# Patient Record
Sex: MALE | Race: WHITE | Employment: OTHER | URBAN - NONMETROPOLITAN AREA
[De-identification: names, ages, dates, MRNs, and addresses within clinical notes are randomized per-mention and may not be internally consistent; named-entity substitution may affect disease eponyms.]

---

## 2020-06-06 ENCOUNTER — HOSPITAL ENCOUNTER (OUTPATIENT)
Age: 75
Setting detail: SPECIMEN
Discharge: HOME | End: 2020-06-06
Payer: MEDICARE

## 2020-06-06 LAB
ALBUMIN SERPL-MCNC: 3.3 GM/DL (ref 3.4–5)
ALP BLD-CCNC: 97 IU/L (ref 40–129)
ALT SERPL-CCNC: 9 U/L (ref 10–40)
ANION GAP SERPL CALCULATED.3IONS-SCNC: 13 MMOL/L (ref 4–16)
AST SERPL-CCNC: 15 IU/L (ref 15–37)
BACTERIA: ABNORMAL /HPF
BASOPHILS ABSOLUTE: 0.1 K/CU MM
BASOPHILS RELATIVE PERCENT: 0.5 % (ref 0–1)
BILIRUB SERPL-MCNC: 0.7 MG/DL (ref 0–1)
BILIRUBIN URINE: NEGATIVE MG/DL
BLOOD, URINE: NEGATIVE
BUN BLDV-MCNC: 14 MG/DL (ref 6–23)
CALCIUM SERPL-MCNC: 9.1 MG/DL (ref 8.3–10.6)
CAST TYPE: NEGATIVE /HPF
CHLORIDE BLD-SCNC: 97 MMOL/L (ref 99–110)
CLARITY: ABNORMAL
CO2: 30 MMOL/L (ref 21–32)
COLOR: YELLOW
CREAT SERPL-MCNC: 0.5 MG/DL (ref 0.9–1.3)
CRYSTAL TYPE: NEGATIVE /HPF
DIFFERENTIAL TYPE: ABNORMAL
EOSINOPHILS ABSOLUTE: 0.2 K/CU MM
EOSINOPHILS RELATIVE PERCENT: 1.8 % (ref 0–3)
EPITHELIAL CELLS, UA: ABNORMAL /HPF
GFR AFRICAN AMERICAN: >60 ML/MIN/1.73M2
GFR NON-AFRICAN AMERICAN: >60 ML/MIN/1.73M2
GLUCOSE BLD-MCNC: 290 MG/DL (ref 70–99)
GLUCOSE, URINE: 500 MG/DL
HCT VFR BLD CALC: 48.8 % (ref 42–52)
HEMOGLOBIN: 15.3 GM/DL (ref 13.5–18)
IMMATURE NEUTROPHIL %: 0.3 % (ref 0–0.43)
KETONES, URINE: NEGATIVE MG/DL
LEUKOCYTE ESTERASE, URINE: NEGATIVE
LYMPHOCYTES ABSOLUTE: 5.1 K/CU MM
LYMPHOCYTES RELATIVE PERCENT: 50.4 % (ref 24–44)
MCH RBC QN AUTO: 29.8 PG (ref 27–31)
MCHC RBC AUTO-ENTMCNC: 31.4 % (ref 32–36)
MCV RBC AUTO: 94.9 FL (ref 78–100)
MONOCYTES ABSOLUTE: 1.1 K/CU MM
MONOCYTES RELATIVE PERCENT: 11.1 % (ref 0–4)
NITRITE URINE, QUANTITATIVE: NEGATIVE
PDW BLD-RTO: 14.2 % (ref 11.7–14.9)
PH, URINE: 6 (ref 5–8)
PLATELET # BLD: 400 K/CU MM (ref 140–440)
PMV BLD AUTO: 11 FL (ref 7.5–11.1)
POTASSIUM SERPL-SCNC: 4.2 MMOL/L (ref 3.5–5.1)
PROTEIN UA: NEGATIVE MG/DL
RBC # BLD: 5.14 M/CU MM (ref 4.6–6.2)
RBC URINE: NEGATIVE /HPF (ref 0–3)
SEGMENTED NEUTROPHILS ABSOLUTE COUNT: 3.6 K/CU MM
SEGMENTED NEUTROPHILS RELATIVE PERCENT: 35.9 % (ref 36–66)
SODIUM BLD-SCNC: 140 MMOL/L (ref 135–145)
SPECIFIC GRAVITY UA: 1.02 (ref 1–1.03)
TOTAL IMMATURE NEUTOROPHIL: 0.03 K/CU MM
TOTAL PROTEIN: 7 GM/DL (ref 6.4–8.2)
UROBILINOGEN, URINE: 4 MG/DL (ref 0.2–1)
WBC # BLD: 10 K/CU MM (ref 4–10.5)
WBC UA: NEGATIVE /HPF (ref 0–2)

## 2020-06-06 PROCEDURE — 87086 URINE CULTURE/COLONY COUNT: CPT

## 2020-06-06 PROCEDURE — 85025 COMPLETE CBC W/AUTO DIFF WBC: CPT

## 2020-06-06 PROCEDURE — 36415 COLL VENOUS BLD VENIPUNCTURE: CPT

## 2020-06-06 PROCEDURE — 80053 COMPREHEN METABOLIC PANEL: CPT

## 2020-06-06 PROCEDURE — 81001 URINALYSIS AUTO W/SCOPE: CPT

## 2020-06-07 LAB
CULTURE: NORMAL
Lab: NORMAL
SPECIMEN: NORMAL

## 2020-07-13 ENCOUNTER — HOSPITAL ENCOUNTER (OUTPATIENT)
Age: 75
Setting detail: SPECIMEN
Discharge: HOME | End: 2020-07-13
Payer: MEDICARE

## 2020-07-13 LAB
ALBUMIN SERPL-MCNC: 3.5 GM/DL (ref 3.4–5)
ALP BLD-CCNC: 72 IU/L (ref 40–128)
ALT SERPL-CCNC: 7 U/L (ref 10–40)
ANION GAP SERPL CALCULATED.3IONS-SCNC: 12 MMOL/L (ref 4–16)
AST SERPL-CCNC: 12 IU/L (ref 15–37)
BILIRUB SERPL-MCNC: 0.6 MG/DL (ref 0–1)
BUN BLDV-MCNC: 17 MG/DL (ref 6–23)
CALCIUM SERPL-MCNC: 8.9 MG/DL (ref 8.3–10.6)
CHLORIDE BLD-SCNC: 98 MMOL/L (ref 99–110)
CHOLESTEROL: 159 MG/DL
CO2: 27 MMOL/L (ref 21–32)
CREAT SERPL-MCNC: 0.4 MG/DL (ref 0.9–1.3)
ESTIMATED AVERAGE GLUCOSE: 214 MG/DL
GFR AFRICAN AMERICAN: >60 ML/MIN/1.73M2
GFR NON-AFRICAN AMERICAN: >60 ML/MIN/1.73M2
GLUCOSE BLD-MCNC: 214 MG/DL (ref 70–99)
HBA1C MFR BLD: 9.1 % (ref 4.2–6.3)
HDLC SERPL-MCNC: 41 MG/DL
LDL CHOLESTEROL DIRECT: 100 MG/DL
POTASSIUM SERPL-SCNC: 4.5 MMOL/L (ref 3.5–5.1)
SODIUM BLD-SCNC: 137 MMOL/L (ref 135–145)
TOTAL PROTEIN: 6.6 GM/DL (ref 6.4–8.2)
TRIGL SERPL-MCNC: 125 MG/DL

## 2020-07-13 PROCEDURE — 80053 COMPREHEN METABOLIC PANEL: CPT

## 2020-07-13 PROCEDURE — 83036 HEMOGLOBIN GLYCOSYLATED A1C: CPT

## 2020-07-13 PROCEDURE — 80061 LIPID PANEL: CPT

## 2020-07-13 PROCEDURE — 83721 ASSAY OF BLOOD LIPOPROTEIN: CPT

## 2021-01-01 ENCOUNTER — APPOINTMENT (OUTPATIENT)
Dept: GENERAL RADIOLOGY | Age: 76
DRG: 871 | End: 2021-01-01
Payer: MEDICARE

## 2021-01-01 ENCOUNTER — HOSPITAL ENCOUNTER (OUTPATIENT)
Dept: WOUND CARE | Age: 76
Discharge: HOME | End: 2021-07-20
Payer: MEDICARE

## 2021-01-01 ENCOUNTER — HOSPITAL ENCOUNTER (OUTPATIENT)
Dept: WOUND CARE | Age: 76
Discharge: HOME | End: 2021-08-10
Payer: MEDICARE

## 2021-01-01 ENCOUNTER — HOSPITAL ENCOUNTER (OUTPATIENT)
Dept: WOUND CARE | Age: 76
Discharge: HOME | End: 2021-07-27
Payer: MEDICARE

## 2021-01-01 ENCOUNTER — HOSPITAL ENCOUNTER (OUTPATIENT)
Dept: WOUND CARE | Age: 76
Discharge: HOME | End: 2021-09-14
Payer: MEDICARE

## 2021-01-01 ENCOUNTER — APPOINTMENT (OUTPATIENT)
Dept: CT IMAGING | Age: 76
DRG: 871 | End: 2021-01-01
Payer: MEDICARE

## 2021-01-01 ENCOUNTER — HOSPITAL ENCOUNTER (OUTPATIENT)
Dept: WOUND CARE | Age: 76
Discharge: HOME | End: 2021-08-24
Payer: MEDICARE

## 2021-01-01 ENCOUNTER — TELEPHONE (OUTPATIENT)
Dept: WOUND CARE | Age: 76
End: 2021-01-01

## 2021-01-01 ENCOUNTER — HOSPITAL ENCOUNTER (OUTPATIENT)
Dept: WOUND CARE | Age: 76
Discharge: HOME | End: 2021-11-02

## 2021-01-01 ENCOUNTER — HOSPITAL ENCOUNTER (OUTPATIENT)
Age: 76
Setting detail: SPECIMEN
Discharge: HOME | End: 2021-09-23
Payer: MEDICARE

## 2021-01-01 ENCOUNTER — HOSPITAL ENCOUNTER (OUTPATIENT)
Dept: WOUND CARE | Age: 76
Discharge: HOME | End: 2021-05-13
Payer: MEDICARE

## 2021-01-01 ENCOUNTER — HOSPITAL ENCOUNTER (OUTPATIENT)
Dept: WOUND CARE | Age: 76
Discharge: HOME | End: 2021-10-19
Payer: MEDICARE

## 2021-01-01 ENCOUNTER — HOSPITAL ENCOUNTER (OUTPATIENT)
Dept: WOUND CARE | Age: 76
Discharge: HOME | End: 2021-07-13
Payer: MEDICARE

## 2021-01-01 ENCOUNTER — HOSPITAL ENCOUNTER (INPATIENT)
Age: 76
LOS: 13 days | DRG: 871 | End: 2021-11-23
Attending: EMERGENCY MEDICINE | Admitting: INTERNAL MEDICINE
Payer: MEDICARE

## 2021-01-01 ENCOUNTER — APPOINTMENT (OUTPATIENT)
Dept: INTERVENTIONAL RADIOLOGY/VASCULAR | Age: 76
DRG: 871 | End: 2021-01-01
Payer: MEDICARE

## 2021-01-01 ENCOUNTER — HOSPITAL ENCOUNTER (OUTPATIENT)
Dept: WOUND CARE | Age: 76
Discharge: HOME | End: 2021-05-04
Payer: MEDICARE

## 2021-01-01 ENCOUNTER — HOSPITAL ENCOUNTER (OUTPATIENT)
Dept: WOUND CARE | Age: 76
Discharge: HOME | End: 2021-10-05
Payer: MEDICARE

## 2021-01-01 VITALS
TEMPERATURE: 97.8 F | SYSTOLIC BLOOD PRESSURE: 128 MMHG | DIASTOLIC BLOOD PRESSURE: 69 MMHG | HEART RATE: 67 BPM | RESPIRATION RATE: 18 BRPM

## 2021-01-01 VITALS
DIASTOLIC BLOOD PRESSURE: 65 MMHG | SYSTOLIC BLOOD PRESSURE: 106 MMHG | HEART RATE: 66 BPM | TEMPERATURE: 97.4 F | DIASTOLIC BLOOD PRESSURE: 63 MMHG | HEART RATE: 84 BPM | RESPIRATION RATE: 16 BRPM | TEMPERATURE: 97.2 F | RESPIRATION RATE: 18 BRPM | SYSTOLIC BLOOD PRESSURE: 101 MMHG

## 2021-01-01 VITALS
HEART RATE: 90 BPM | TEMPERATURE: 96.9 F | DIASTOLIC BLOOD PRESSURE: 60 MMHG | SYSTOLIC BLOOD PRESSURE: 91 MMHG | RESPIRATION RATE: 18 BRPM

## 2021-01-01 VITALS
SYSTOLIC BLOOD PRESSURE: 110 MMHG | HEIGHT: 69 IN | RESPIRATION RATE: 33 BRPM | BODY MASS INDEX: 29.95 KG/M2 | DIASTOLIC BLOOD PRESSURE: 68 MMHG | TEMPERATURE: 97.5 F | HEART RATE: 107 BPM | WEIGHT: 202.2 LBS | OXYGEN SATURATION: 89 %

## 2021-01-01 VITALS
SYSTOLIC BLOOD PRESSURE: 117 MMHG | HEART RATE: 105 BPM | TEMPERATURE: 98.4 F | RESPIRATION RATE: 16 BRPM | DIASTOLIC BLOOD PRESSURE: 65 MMHG

## 2021-01-01 VITALS
DIASTOLIC BLOOD PRESSURE: 62 MMHG | RESPIRATION RATE: 18 BRPM | TEMPERATURE: 97.1 F | HEART RATE: 81 BPM | SYSTOLIC BLOOD PRESSURE: 111 MMHG

## 2021-01-01 VITALS
RESPIRATION RATE: 18 BRPM | TEMPERATURE: 97.1 F | DIASTOLIC BLOOD PRESSURE: 66 MMHG | SYSTOLIC BLOOD PRESSURE: 95 MMHG | HEART RATE: 86 BPM

## 2021-01-01 VITALS
RESPIRATION RATE: 18 BRPM | SYSTOLIC BLOOD PRESSURE: 146 MMHG | HEART RATE: 84 BPM | TEMPERATURE: 97.4 F | DIASTOLIC BLOOD PRESSURE: 64 MMHG

## 2021-01-01 DIAGNOSIS — J96.01 ACUTE RESPIRATORY FAILURE WITH HYPOXIA (HCC): Primary | ICD-10-CM

## 2021-01-01 DIAGNOSIS — L97.425 DIABETIC ULCER OF LEFT MIDFOOT ASSOCIATED WITH DIABETES MELLITUS DUE TO UNDERLYING CONDITION, WITH MUSCLE INVOLVEMENT WITHOUT EVIDENCE OF NECROSIS (HCC): Primary | ICD-10-CM

## 2021-01-01 DIAGNOSIS — M86.9 OSTEOMYELITIS OF GREAT TOE OF LEFT FOOT (HCC): ICD-10-CM

## 2021-01-01 DIAGNOSIS — L97.425 DIABETIC ULCER OF LEFT MIDFOOT ASSOCIATED WITH DIABETES MELLITUS DUE TO UNDERLYING CONDITION, WITH MUSCLE INVOLVEMENT WITHOUT EVIDENCE OF NECROSIS (HCC): ICD-10-CM

## 2021-01-01 DIAGNOSIS — E08.621 DIABETIC ULCER OF LEFT MIDFOOT ASSOCIATED WITH DIABETES MELLITUS DUE TO UNDERLYING CONDITION, WITH FAT LAYER EXPOSED (HCC): Primary | ICD-10-CM

## 2021-01-01 DIAGNOSIS — L97.422 DIABETIC ULCER OF LEFT MIDFOOT ASSOCIATED WITH DIABETES MELLITUS DUE TO UNDERLYING CONDITION, WITH FAT LAYER EXPOSED (HCC): Primary | ICD-10-CM

## 2021-01-01 DIAGNOSIS — M86.9 OSTEOMYELITIS OF GREAT TOE OF LEFT FOOT (HCC): Primary | ICD-10-CM

## 2021-01-01 DIAGNOSIS — E08.621 DIABETIC ULCER OF LEFT MIDFOOT ASSOCIATED WITH DIABETES MELLITUS DUE TO UNDERLYING CONDITION, WITH MUSCLE INVOLVEMENT WITHOUT EVIDENCE OF NECROSIS (HCC): Primary | ICD-10-CM

## 2021-01-01 DIAGNOSIS — I48.91 ATRIAL FIBRILLATION WITH RVR (HCC): ICD-10-CM

## 2021-01-01 DIAGNOSIS — U07.1 COVID-19: ICD-10-CM

## 2021-01-01 DIAGNOSIS — E08.621 DIABETIC ULCER OF LEFT MIDFOOT ASSOCIATED WITH DIABETES MELLITUS DUE TO UNDERLYING CONDITION, WITH MUSCLE INVOLVEMENT WITHOUT EVIDENCE OF NECROSIS (HCC): ICD-10-CM

## 2021-01-01 LAB
ALBUMIN SERPL-MCNC: 2.2 GM/DL (ref 3.4–5)
ALBUMIN SERPL-MCNC: 2.2 GM/DL (ref 3.4–5)
ALBUMIN SERPL-MCNC: 2.4 GM/DL (ref 3.4–5)
ALBUMIN SERPL-MCNC: 2.5 GM/DL (ref 3.4–5)
ALBUMIN SERPL-MCNC: 2.6 GM/DL (ref 3.4–5)
ALBUMIN SERPL-MCNC: 2.7 GM/DL (ref 3.4–5)
ALBUMIN SERPL-MCNC: 2.7 GM/DL (ref 3.4–5)
ALBUMIN SERPL-MCNC: 3.1 GM/DL (ref 3.4–5)
ALP BLD-CCNC: 124 IU/L (ref 40–129)
ALP BLD-CCNC: 130 IU/L (ref 40–129)
ALP BLD-CCNC: 148 IU/L (ref 40–129)
ALP BLD-CCNC: 66 IU/L (ref 40–128)
ALP BLD-CCNC: 67 IU/L (ref 40–128)
ALP BLD-CCNC: 73 IU/L (ref 40–128)
ALP BLD-CCNC: 83 IU/L (ref 40–129)
ALP BLD-CCNC: 85 IU/L (ref 40–129)
ALP BLD-CCNC: 93 IU/L (ref 40–129)
ALP BLD-CCNC: 93 IU/L (ref 40–129)
ALP BLD-CCNC: 95 IU/L (ref 40–129)
ALP BLD-CCNC: 98 IU/L (ref 40–129)
ALT SERPL-CCNC: 10 U/L (ref 10–40)
ALT SERPL-CCNC: 13 U/L (ref 10–40)
ALT SERPL-CCNC: 15 U/L (ref 10–40)
ALT SERPL-CCNC: 15 U/L (ref 10–40)
ALT SERPL-CCNC: 18 U/L (ref 10–40)
ALT SERPL-CCNC: 19 U/L (ref 10–40)
ALT SERPL-CCNC: 21 U/L (ref 10–40)
ALT SERPL-CCNC: 21 U/L (ref 10–40)
ALT SERPL-CCNC: 23 U/L (ref 10–40)
ALT SERPL-CCNC: 25 U/L (ref 10–40)
ALT SERPL-CCNC: 8 U/L (ref 10–40)
ALT SERPL-CCNC: 9 U/L (ref 10–40)
ANION GAP SERPL CALCULATED.3IONS-SCNC: 10 MMOL/L (ref 4–16)
ANION GAP SERPL CALCULATED.3IONS-SCNC: 10 MMOL/L (ref 4–16)
ANION GAP SERPL CALCULATED.3IONS-SCNC: 11 MMOL/L (ref 4–16)
ANION GAP SERPL CALCULATED.3IONS-SCNC: 11 MMOL/L (ref 4–16)
ANION GAP SERPL CALCULATED.3IONS-SCNC: 12 MMOL/L (ref 4–16)
ANION GAP SERPL CALCULATED.3IONS-SCNC: 13 MMOL/L (ref 4–16)
ANION GAP SERPL CALCULATED.3IONS-SCNC: 14 MMOL/L (ref 4–16)
ANION GAP SERPL CALCULATED.3IONS-SCNC: 15 MMOL/L (ref 4–16)
ANION GAP SERPL CALCULATED.3IONS-SCNC: NORMAL MMOL/L
ANISOCYTOSIS: ABNORMAL
ANISOCYTOSIS: ABNORMAL
APTT: 32.2 SECONDS (ref 25.1–37.1)
AST SERPL-CCNC: 23 IU/L (ref 15–37)
AST SERPL-CCNC: 27 IU/L (ref 15–37)
AST SERPL-CCNC: 27 IU/L (ref 15–37)
AST SERPL-CCNC: 28 IU/L (ref 15–37)
AST SERPL-CCNC: 29 IU/L (ref 15–37)
AST SERPL-CCNC: 31 IU/L (ref 15–37)
AST SERPL-CCNC: 34 IU/L (ref 15–37)
AST SERPL-CCNC: 36 IU/L (ref 15–37)
AST SERPL-CCNC: 44 IU/L (ref 15–37)
ATYPICAL LYMPHOCYTE ABSOLUTE COUNT: ABNORMAL
BACTERIA: NEGATIVE /HPF
BANDED NEUTROPHILS ABSOLUTE COUNT: 0.11 K/CU MM
BANDED NEUTROPHILS ABSOLUTE COUNT: 0.11 K/CU MM
BANDED NEUTROPHILS ABSOLUTE COUNT: 0.42 K/CU MM
BANDED NEUTROPHILS ABSOLUTE COUNT: 0.95 K/CU MM
BANDED NEUTROPHILS ABSOLUTE COUNT: 1.58 K/CU MM
BANDED NEUTROPHILS ABSOLUTE COUNT: 1.7 K/CU MM
BANDED NEUTROPHILS ABSOLUTE COUNT: 2.23 K/CU MM
BANDED NEUTROPHILS ABSOLUTE COUNT: 2.42 K/CU MM
BANDED NEUTROPHILS RELATIVE PERCENT: 1 % (ref 5–11)
BANDED NEUTROPHILS RELATIVE PERCENT: 1 % (ref 5–11)
BANDED NEUTROPHILS RELATIVE PERCENT: 10 % (ref 5–11)
BANDED NEUTROPHILS RELATIVE PERCENT: 11 % (ref 5–11)
BANDED NEUTROPHILS RELATIVE PERCENT: 16 % (ref 5–11)
BANDED NEUTROPHILS RELATIVE PERCENT: 16 % (ref 5–11)
BANDED NEUTROPHILS RELATIVE PERCENT: 3 % (ref 5–11)
BANDED NEUTROPHILS RELATIVE PERCENT: 8 % (ref 5–11)
BASE EXCESS MIXED: 5 (ref 0–1.2)
BASE EXCESS: 2 (ref 0–3.3)
BASOPHILS ABSOLUTE: 0 K/CU MM
BASOPHILS ABSOLUTE: 0.1 K/CU MM
BASOPHILS RELATIVE PERCENT: 0.1 % (ref 0–1)
BASOPHILS RELATIVE PERCENT: 0.1 % (ref 0–1)
BASOPHILS RELATIVE PERCENT: 0.2 % (ref 0–1)
BASOPHILS RELATIVE PERCENT: 0.5 % (ref 0–1)
BILIRUB SERPL-MCNC: 0.4 MG/DL (ref 0–1)
BILIRUB SERPL-MCNC: 0.5 MG/DL (ref 0–1)
BILIRUB SERPL-MCNC: 0.6 MG/DL (ref 0–1)
BILIRUB SERPL-MCNC: 0.6 MG/DL (ref 0–1)
BILIRUB SERPL-MCNC: 0.7 MG/DL (ref 0–1)
BILIRUB SERPL-MCNC: 0.8 MG/DL (ref 0–1)
BILIRUB SERPL-MCNC: 0.9 MG/DL (ref 0–1)
BILIRUB SERPL-MCNC: 0.9 MG/DL (ref 0–1)
BILIRUBIN DIRECT: 0.2 MG/DL (ref 0–0.3)
BILIRUBIN DIRECT: 0.3 MG/DL (ref 0–0.3)
BILIRUBIN URINE: NEGATIVE MG/DL
BILIRUBIN, INDIRECT: 0.2 MG/DL (ref 0–0.7)
BILIRUBIN, INDIRECT: 0.3 MG/DL (ref 0–0.7)
BILIRUBIN, INDIRECT: 0.4 MG/DL (ref 0–0.7)
BILIRUBIN, INDIRECT: 0.5 MG/DL (ref 0–0.7)
BILIRUBIN, INDIRECT: 0.6 MG/DL (ref 0–0.7)
BLOOD, URINE: ABNORMAL
BUN BLDV-MCNC: 22 MG/DL (ref 6–23)
BUN BLDV-MCNC: 25 MG/DL (ref 6–23)
BUN BLDV-MCNC: 25 MG/DL (ref 6–23)
BUN BLDV-MCNC: 26 MG/DL (ref 6–23)
BUN BLDV-MCNC: 27 MG/DL (ref 6–23)
BUN BLDV-MCNC: 28 MG/DL (ref 6–23)
BUN BLDV-MCNC: 28 MG/DL (ref 6–23)
BUN BLDV-MCNC: 30 MG/DL (ref 6–23)
BUN BLDV-MCNC: 37 MG/DL (ref 6–23)
BUN BLDV-MCNC: 38 MG/DL (ref 6–23)
BUN BLDV-MCNC: 40 MG/DL (ref 6–23)
BUN BLDV-MCNC: 9 MG/DL (ref 6–23)
BUN BLDV-MCNC: NORMAL MG/DL
BURR CELLS: ABNORMAL
C DIFF AG + TOXIN: NORMAL
CALCIUM SERPL-MCNC: 7.2 MG/DL (ref 8.3–10.6)
CALCIUM SERPL-MCNC: 7.5 MG/DL (ref 8.3–10.6)
CALCIUM SERPL-MCNC: 7.6 MG/DL (ref 8.3–10.6)
CALCIUM SERPL-MCNC: 7.6 MG/DL (ref 8.3–10.6)
CALCIUM SERPL-MCNC: 7.7 MG/DL (ref 8.3–10.6)
CALCIUM SERPL-MCNC: 7.8 MG/DL (ref 8.3–10.6)
CALCIUM SERPL-MCNC: 7.9 MG/DL (ref 8.3–10.6)
CALCIUM SERPL-MCNC: 8 MG/DL (ref 8.3–10.6)
CALCIUM SERPL-MCNC: 8 MG/DL (ref 8.3–10.6)
CALCIUM SERPL-MCNC: 8.2 MG/DL (ref 8.3–10.6)
CALCIUM SERPL-MCNC: 8.6 MG/DL (ref 8.3–10.6)
CALCIUM SERPL-MCNC: NORMAL MG/DL
CARBON MONOXIDE, BLOOD: 1.8 % (ref 0–5)
CHLORIDE BLD-SCNC: 100 MMOL/L (ref 99–110)
CHLORIDE BLD-SCNC: 101 MMOL/L (ref 99–110)
CHLORIDE BLD-SCNC: 103 MMOL/L (ref 99–110)
CHLORIDE BLD-SCNC: 104 MMOL/L (ref 99–110)
CHLORIDE BLD-SCNC: 94 MMOL/L (ref 99–110)
CHLORIDE BLD-SCNC: 94 MMOL/L (ref 99–110)
CHLORIDE BLD-SCNC: 98 MMOL/L (ref 99–110)
CHLORIDE BLD-SCNC: 99 MMOL/L (ref 99–110)
CHLORIDE BLD-SCNC: NORMAL MMOL/L
CLARITY: CLEAR
CO2 CONTENT: 30.3 MMOL/L (ref 19–24)
CO2: 20 MMOL/L (ref 21–32)
CO2: 20 MMOL/L (ref 21–32)
CO2: 23 MMOL/L (ref 21–32)
CO2: 24 MMOL/L (ref 21–32)
CO2: 25 MMOL/L (ref 21–32)
CO2: 26 MMOL/L (ref 21–32)
CO2: 31 MMOL/L (ref 21–32)
CO2: NORMAL MMOL/L
COLOR: YELLOW
COMMENT: ABNORMAL
COMMENT: ABNORMAL
CREAT SERPL-MCNC: 0.4 MG/DL (ref 0.9–1.3)
CREAT SERPL-MCNC: 0.5 MG/DL (ref 0.9–1.3)
CREAT SERPL-MCNC: 0.6 MG/DL (ref 0.9–1.3)
CREAT SERPL-MCNC: 0.6 MG/DL (ref 0.9–1.3)
CREAT SERPL-MCNC: NORMAL MG/DL
CULTURE: ABNORMAL
CULTURE: NORMAL
D DIMER: 1253 NG/ML(DDU)
D DIMER: 923 NG/ML(DDU)
DIFFERENTIAL TYPE: ABNORMAL
DOHLE BODIES: PRESENT
EKG ATRIAL RATE: 108 BPM
EKG ATRIAL RATE: 74 BPM
EKG DIAGNOSIS: NORMAL
EKG DIAGNOSIS: NORMAL
EKG Q-T INTERVAL: 394 MS
EKG Q-T INTERVAL: 420 MS
EKG QRS DURATION: 152 MS
EKG QRS DURATION: 156 MS
EKG QTC CALCULATION (BAZETT): 560 MS
EKG QTC CALCULATION (BAZETT): 570 MS
EKG R AXIS: -24 DEGREES
EKG R AXIS: -31 DEGREES
EKG T AXIS: 43 DEGREES
EKG T AXIS: 43 DEGREES
EKG VENTRICULAR RATE: 107 BPM
EKG VENTRICULAR RATE: 126 BPM
EOSINOPHILS ABSOLUTE: 0 K/CU MM
EOSINOPHILS ABSOLUTE: 0.2 K/CU MM
EOSINOPHILS ABSOLUTE: 0.2 K/CU MM
EOSINOPHILS RELATIVE PERCENT: 0 % (ref 0–3)
EOSINOPHILS RELATIVE PERCENT: 1 % (ref 0–3)
EOSINOPHILS RELATIVE PERCENT: 1.9 % (ref 0–3)
ESTIMATED AVERAGE GLUCOSE: 200 MG/DL
ESTIMATED AVERAGE GLUCOSE: 217 MG/DL
FERRITIN: 1601 NG/ML (ref 30–400)
FIBRINOGEN LEVEL: 622 MG/DL (ref 196.9–442.1)
GFR AFRICAN AMERICAN: >60 ML/MIN/1.73M2
GFR AFRICAN AMERICAN: NORMAL ML/MIN/1.73M2
GFR NON-AFRICAN AMERICAN: >60 ML/MIN/1.73M2
GFR NON-AFRICAN AMERICAN: NORMAL ML/MIN/1.73M2
GLUCOSE BLD-MCNC: 100 MG/DL (ref 70–99)
GLUCOSE BLD-MCNC: 101 MG/DL (ref 70–99)
GLUCOSE BLD-MCNC: 103 MG/DL (ref 70–99)
GLUCOSE BLD-MCNC: 109 MG/DL (ref 70–99)
GLUCOSE BLD-MCNC: 112 MG/DL (ref 70–99)
GLUCOSE BLD-MCNC: 114 MG/DL (ref 70–99)
GLUCOSE BLD-MCNC: 115 MG/DL (ref 70–99)
GLUCOSE BLD-MCNC: 119 MG/DL (ref 70–99)
GLUCOSE BLD-MCNC: 123 MG/DL (ref 70–99)
GLUCOSE BLD-MCNC: 126 MG/DL (ref 70–99)
GLUCOSE BLD-MCNC: 127 MG/DL (ref 70–99)
GLUCOSE BLD-MCNC: 128 MG/DL (ref 70–99)
GLUCOSE BLD-MCNC: 129 MG/DL (ref 70–99)
GLUCOSE BLD-MCNC: 130 MG/DL (ref 70–99)
GLUCOSE BLD-MCNC: 131 MG/DL (ref 70–99)
GLUCOSE BLD-MCNC: 137 MG/DL (ref 70–99)
GLUCOSE BLD-MCNC: 139 MG/DL (ref 70–99)
GLUCOSE BLD-MCNC: 139 MG/DL (ref 70–99)
GLUCOSE BLD-MCNC: 141 MG/DL (ref 70–99)
GLUCOSE BLD-MCNC: 144 MG/DL (ref 70–99)
GLUCOSE BLD-MCNC: 145 MG/DL (ref 70–99)
GLUCOSE BLD-MCNC: 147 MG/DL (ref 70–99)
GLUCOSE BLD-MCNC: 151 MG/DL (ref 70–99)
GLUCOSE BLD-MCNC: 152 MG/DL (ref 70–99)
GLUCOSE BLD-MCNC: 153 MG/DL (ref 70–99)
GLUCOSE BLD-MCNC: 154 MG/DL (ref 70–99)
GLUCOSE BLD-MCNC: 154 MG/DL (ref 70–99)
GLUCOSE BLD-MCNC: 156 MG/DL (ref 70–99)
GLUCOSE BLD-MCNC: 156 MG/DL (ref 70–99)
GLUCOSE BLD-MCNC: 158 MG/DL (ref 70–99)
GLUCOSE BLD-MCNC: 171 MG/DL (ref 70–99)
GLUCOSE BLD-MCNC: 172 MG/DL (ref 70–99)
GLUCOSE BLD-MCNC: 172 MG/DL (ref 70–99)
GLUCOSE BLD-MCNC: 177 MG/DL (ref 70–99)
GLUCOSE BLD-MCNC: 179 MG/DL (ref 70–99)
GLUCOSE BLD-MCNC: 195 MG/DL (ref 70–99)
GLUCOSE BLD-MCNC: 196 MG/DL (ref 70–99)
GLUCOSE BLD-MCNC: 202 MG/DL (ref 70–99)
GLUCOSE BLD-MCNC: 204 MG/DL (ref 70–99)
GLUCOSE BLD-MCNC: 210 MG/DL (ref 70–99)
GLUCOSE BLD-MCNC: 211 MG/DL (ref 70–99)
GLUCOSE BLD-MCNC: 216 MG/DL (ref 70–99)
GLUCOSE BLD-MCNC: 225 MG/DL (ref 70–99)
GLUCOSE BLD-MCNC: 227 MG/DL (ref 70–99)
GLUCOSE BLD-MCNC: 233 MG/DL (ref 70–99)
GLUCOSE BLD-MCNC: 240 MG/DL (ref 70–99)
GLUCOSE BLD-MCNC: 245 MG/DL (ref 70–99)
GLUCOSE BLD-MCNC: 246 MG/DL (ref 70–99)
GLUCOSE BLD-MCNC: 250 MG/DL (ref 70–99)
GLUCOSE BLD-MCNC: 255 MG/DL (ref 70–99)
GLUCOSE BLD-MCNC: 262 MG/DL (ref 70–99)
GLUCOSE BLD-MCNC: 267 MG/DL (ref 70–99)
GLUCOSE BLD-MCNC: 286 MG/DL (ref 70–99)
GLUCOSE BLD-MCNC: 305 MG/DL (ref 70–99)
GLUCOSE BLD-MCNC: 309 MG/DL (ref 70–99)
GLUCOSE BLD-MCNC: 315 MG/DL (ref 70–99)
GLUCOSE BLD-MCNC: 322 MG/DL (ref 70–99)
GLUCOSE BLD-MCNC: 322 MG/DL (ref 70–99)
GLUCOSE BLD-MCNC: 360 MG/DL (ref 70–99)
GLUCOSE BLD-MCNC: 391 MG/DL (ref 70–99)
GLUCOSE BLD-MCNC: 404 MG/DL (ref 70–99)
GLUCOSE BLD-MCNC: 409 MG/DL (ref 70–99)
GLUCOSE BLD-MCNC: 41 MG/DL (ref 70–99)
GLUCOSE BLD-MCNC: 43 MG/DL (ref 70–99)
GLUCOSE BLD-MCNC: 43 MG/DL (ref 70–99)
GLUCOSE BLD-MCNC: 431 MG/DL (ref 70–99)
GLUCOSE BLD-MCNC: 432 MG/DL (ref 70–99)
GLUCOSE BLD-MCNC: 47 MG/DL (ref 70–99)
GLUCOSE BLD-MCNC: 49 MG/DL (ref 70–99)
GLUCOSE BLD-MCNC: 52 MG/DL (ref 70–99)
GLUCOSE BLD-MCNC: 525 MG/DL (ref 70–99)
GLUCOSE BLD-MCNC: 527 MG/DL (ref 70–99)
GLUCOSE BLD-MCNC: 53 MG/DL (ref 70–99)
GLUCOSE BLD-MCNC: 58 MG/DL (ref 70–99)
GLUCOSE BLD-MCNC: 58 MG/DL (ref 70–99)
GLUCOSE BLD-MCNC: 63 MG/DL (ref 70–99)
GLUCOSE BLD-MCNC: 66 MG/DL (ref 70–99)
GLUCOSE BLD-MCNC: 70 MG/DL (ref 70–99)
GLUCOSE BLD-MCNC: 74 MG/DL (ref 70–99)
GLUCOSE BLD-MCNC: 80 MG/DL (ref 70–99)
GLUCOSE BLD-MCNC: 82 MG/DL (ref 70–99)
GLUCOSE BLD-MCNC: 85 MG/DL (ref 70–99)
GLUCOSE BLD-MCNC: 86 MG/DL (ref 70–99)
GLUCOSE BLD-MCNC: 90 MG/DL (ref 70–99)
GLUCOSE BLD-MCNC: 91 MG/DL (ref 70–99)
GLUCOSE BLD-MCNC: 91 MG/DL (ref 70–99)
GLUCOSE BLD-MCNC: 93 MG/DL (ref 70–99)
GLUCOSE BLD-MCNC: 94 MG/DL (ref 70–99)
GLUCOSE BLD-MCNC: 95 MG/DL (ref 70–99)
GLUCOSE BLD-MCNC: 97 MG/DL (ref 70–99)
GLUCOSE BLD-MCNC: 99 MG/DL (ref 70–99)
GLUCOSE BLD-MCNC: NORMAL MG/DL
GLUCOSE, URINE: 50 MG/DL
GRAM SMEAR: NORMAL
HBA1C MFR BLD: 8.6 % (ref 4.2–6.3)
HBA1C MFR BLD: 9.2 % (ref 4.2–6.3)
HCO3 ARTERIAL: 29.1 MMOL/L (ref 18–23)
HCO3 VENOUS: 23.7 MMOL/L (ref 19–25)
HCT VFR BLD CALC: 30.5 % (ref 42–52)
HCT VFR BLD CALC: 30.8 % (ref 42–52)
HCT VFR BLD CALC: 31.5 % (ref 42–52)
HCT VFR BLD CALC: 31.6 % (ref 42–52)
HCT VFR BLD CALC: 32.4 % (ref 42–52)
HCT VFR BLD CALC: 37.4 % (ref 42–52)
HCT VFR BLD CALC: 39.6 % (ref 42–52)
HCT VFR BLD CALC: 40.7 % (ref 42–52)
HCT VFR BLD CALC: 40.9 % (ref 42–52)
HCT VFR BLD CALC: 41.1 % (ref 42–52)
HCT VFR BLD CALC: 43.2 % (ref 42–52)
HCT VFR BLD CALC: 43.9 % (ref 42–52)
HEMOGLOBIN: 10.2 GM/DL (ref 13.5–18)
HEMOGLOBIN: 10.4 GM/DL (ref 13.5–18)
HEMOGLOBIN: 10.5 GM/DL (ref 13.5–18)
HEMOGLOBIN: 10.6 GM/DL (ref 13.5–18)
HEMOGLOBIN: 10.6 GM/DL (ref 13.5–18)
HEMOGLOBIN: 12.2 GM/DL (ref 13.5–18)
HEMOGLOBIN: 12.7 GM/DL (ref 13.5–18)
HEMOGLOBIN: 13.2 GM/DL (ref 13.5–18)
HEMOGLOBIN: 13.4 GM/DL (ref 13.5–18)
HEMOGLOBIN: 13.5 GM/DL (ref 13.5–18)
HEMOGLOBIN: 14.3 GM/DL (ref 13.5–18)
HEMOGLOBIN: 14.5 GM/DL (ref 13.5–18)
HIGH SENSITIVE C-REACTIVE PROTEIN: 107.1 MG/L
HIGH SENSITIVE C-REACTIVE PROTEIN: 247.3 MG/L
HOWELL-JOLLY BODIES: PRESENT
IMMATURE NEUTROPHIL %: 0.3 % (ref 0–0.43)
IMMATURE NEUTROPHIL %: 0.5 % (ref 0–0.43)
IMMATURE NEUTROPHIL %: 0.7 % (ref 0–0.43)
IMMATURE NEUTROPHIL %: 0.8 % (ref 0–0.43)
INR BLD: 0.92 INDEX
KETONES, URINE: NEGATIVE MG/DL
LACTATE DEHYDROGENASE: 339 IU/L (ref 120–246)
LACTATE: 1.5 MMOL/L (ref 0.4–2)
LEGIONELLA URINARY AG: NEGATIVE
LEUKOCYTE ESTERASE, URINE: NEGATIVE
LV EF: 30 %
LVEF MODALITY: NORMAL
LYMPHOCYTES ABSOLUTE: 0.2 K/CU MM
LYMPHOCYTES ABSOLUTE: 0.2 K/CU MM
LYMPHOCYTES ABSOLUTE: 0.3 K/CU MM
LYMPHOCYTES ABSOLUTE: 0.4 K/CU MM
LYMPHOCYTES ABSOLUTE: 0.6 K/CU MM
LYMPHOCYTES ABSOLUTE: 0.6 K/CU MM
LYMPHOCYTES ABSOLUTE: 1 K/CU MM
LYMPHOCYTES ABSOLUTE: 1.4 K/CU MM
LYMPHOCYTES ABSOLUTE: 1.7 K/CU MM
LYMPHOCYTES ABSOLUTE: 3.9 K/CU MM
LYMPHOCYTES RELATIVE PERCENT: 1 % (ref 24–44)
LYMPHOCYTES RELATIVE PERCENT: 15 % (ref 24–44)
LYMPHOCYTES RELATIVE PERCENT: 2 % (ref 24–44)
LYMPHOCYTES RELATIVE PERCENT: 2 % (ref 24–44)
LYMPHOCYTES RELATIVE PERCENT: 3 % (ref 24–44)
LYMPHOCYTES RELATIVE PERCENT: 3 % (ref 24–44)
LYMPHOCYTES RELATIVE PERCENT: 33.2 % (ref 24–44)
LYMPHOCYTES RELATIVE PERCENT: 4 % (ref 24–44)
LYMPHOCYTES RELATIVE PERCENT: 5 % (ref 24–44)
LYMPHOCYTES RELATIVE PERCENT: 5 % (ref 24–44)
LYMPHOCYTES RELATIVE PERCENT: 5.3 % (ref 24–44)
LYMPHOCYTES RELATIVE PERCENT: 9.6 % (ref 24–44)
Lab: ABNORMAL
Lab: ABNORMAL
Lab: NORMAL
MAGNESIUM: 1.7 MG/DL (ref 1.8–2.4)
MAGNESIUM: 1.8 MG/DL (ref 1.8–2.4)
MAGNESIUM: 1.8 MG/DL (ref 1.8–2.4)
MAGNESIUM: 1.9 MG/DL (ref 1.8–2.4)
MAGNESIUM: 1.9 MG/DL (ref 1.8–2.4)
MAGNESIUM: 2 MG/DL (ref 1.8–2.4)
MAGNESIUM: 2.1 MG/DL (ref 1.8–2.4)
MAGNESIUM: 2.4 MG/DL (ref 1.8–2.4)
MCH RBC QN AUTO: 29.5 PG (ref 27–31)
MCH RBC QN AUTO: 29.5 PG (ref 27–31)
MCH RBC QN AUTO: 29.7 PG (ref 27–31)
MCH RBC QN AUTO: 29.8 PG (ref 27–31)
MCH RBC QN AUTO: 29.9 PG (ref 27–31)
MCH RBC QN AUTO: 29.9 PG (ref 27–31)
MCH RBC QN AUTO: 30 PG (ref 27–31)
MCH RBC QN AUTO: 30.3 PG (ref 27–31)
MCH RBC QN AUTO: 30.7 PG (ref 27–31)
MCH RBC QN AUTO: 31.6 PG (ref 27–31)
MCHC RBC AUTO-ENTMCNC: 32.1 % (ref 32–36)
MCHC RBC AUTO-ENTMCNC: 32.1 % (ref 32–36)
MCHC RBC AUTO-ENTMCNC: 32.6 % (ref 32–36)
MCHC RBC AUTO-ENTMCNC: 32.7 % (ref 32–36)
MCHC RBC AUTO-ENTMCNC: 32.9 % (ref 32–36)
MCHC RBC AUTO-ENTMCNC: 33 % (ref 32–36)
MCHC RBC AUTO-ENTMCNC: 33.1 % (ref 32–36)
MCHC RBC AUTO-ENTMCNC: 33.2 % (ref 32–36)
MCHC RBC AUTO-ENTMCNC: 33.4 % (ref 32–36)
MCHC RBC AUTO-ENTMCNC: 34.4 % (ref 32–36)
MCV RBC AUTO: 87.3 FL (ref 78–100)
MCV RBC AUTO: 89.4 FL (ref 78–100)
MCV RBC AUTO: 89.5 FL (ref 78–100)
MCV RBC AUTO: 89.5 FL (ref 78–100)
MCV RBC AUTO: 90.3 FL (ref 78–100)
MCV RBC AUTO: 90.8 FL (ref 78–100)
MCV RBC AUTO: 90.9 FL (ref 78–100)
MCV RBC AUTO: 91.3 FL (ref 78–100)
MCV RBC AUTO: 91.5 FL (ref 78–100)
MCV RBC AUTO: 92.8 FL (ref 78–100)
MCV RBC AUTO: 92.8 FL (ref 78–100)
MCV RBC AUTO: 98.5 FL (ref 78–100)
METAMYELOCYTES ABSOLUTE COUNT: 0.2 K/CU MM
METAMYELOCYTES PERCENT: 1 %
METHEMOGLOBIN ARTERIAL: 1.3 %
MONOCYTES ABSOLUTE: 0.2 K/CU MM
MONOCYTES ABSOLUTE: 0.4 K/CU MM
MONOCYTES ABSOLUTE: 0.5 K/CU MM
MONOCYTES ABSOLUTE: 0.6 K/CU MM
MONOCYTES ABSOLUTE: 0.8 K/CU MM
MONOCYTES ABSOLUTE: 1.1 K/CU MM
MONOCYTES ABSOLUTE: 1.1 K/CU MM
MONOCYTES ABSOLUTE: 1.8 K/CU MM
MONOCYTES RELATIVE PERCENT: 15.2 % (ref 0–4)
MONOCYTES RELATIVE PERCENT: 2 % (ref 0–4)
MONOCYTES RELATIVE PERCENT: 3 % (ref 0–4)
MONOCYTES RELATIVE PERCENT: 3.8 % (ref 0–4)
MONOCYTES RELATIVE PERCENT: 4 % (ref 0–4)
MONOCYTES RELATIVE PERCENT: 4.7 % (ref 0–4)
MONOCYTES RELATIVE PERCENT: 7.5 % (ref 0–4)
MONOCYTES RELATIVE PERCENT: 8 % (ref 0–4)
MONOCYTES RELATIVE PERCENT: 8 % (ref 0–4)
MYELOCYTE PERCENT: 1 %
MYELOCYTES ABSOLUTE COUNT: 0.11 K/CU MM
NITRITE URINE, QUANTITATIVE: NEGATIVE
NUCLEATED RBC %: 0 %
NUCLEATED RBC %: 0 %
NUCLEATED RBC %: 0.3 %
NUCLEATED RBC %: 0.3 %
O2 SAT, VEN: 93.3 % (ref 50–70)
O2 SATURATION: 93.2 % (ref 96–97)
PCO2 ARTERIAL: 40 MMHG (ref 32–45)
PCO2, VEN: 43 MMHG (ref 38–52)
PDW BLD-RTO: 13.6 % (ref 11.7–14.9)
PDW BLD-RTO: 13.7 % (ref 11.7–14.9)
PDW BLD-RTO: 13.8 % (ref 11.7–14.9)
PDW BLD-RTO: 13.9 % (ref 11.7–14.9)
PDW BLD-RTO: 14 % (ref 11.7–14.9)
PDW BLD-RTO: 15.3 % (ref 11.7–14.9)
PH BLOOD: 7.47 (ref 7.34–7.45)
PH VENOUS: 7.35 (ref 7.32–7.42)
PH, URINE: 7 (ref 5–8)
PHOSPHORUS: 1.4 MG/DL (ref 2.5–4.9)
PHOSPHORUS: 1.8 MG/DL (ref 2.5–4.9)
PHOSPHORUS: 2 MG/DL (ref 2.5–4.9)
PHOSPHORUS: 2.2 MG/DL (ref 2.5–4.9)
PHOSPHORUS: 2.7 MG/DL (ref 2.5–4.9)
PHOSPHORUS: 2.7 MG/DL (ref 2.5–4.9)
PHOSPHORUS: 2.9 MG/DL (ref 2.5–4.9)
PHOSPHORUS: 2.9 MG/DL (ref 2.5–4.9)
PHOSPHORUS: 3.3 MG/DL (ref 2.5–4.9)
PLATELET # BLD: 206 K/CU MM (ref 140–440)
PLATELET # BLD: 211 K/CU MM (ref 140–440)
PLATELET # BLD: 230 K/CU MM (ref 140–440)
PLATELET # BLD: 242 K/CU MM (ref 140–440)
PLATELET # BLD: 243 K/CU MM (ref 140–440)
PLATELET # BLD: 245 K/CU MM (ref 140–440)
PLATELET # BLD: 245 K/CU MM (ref 140–440)
PLATELET # BLD: 260 K/CU MM (ref 140–440)
PLATELET # BLD: 285 K/CU MM (ref 140–440)
PLATELET # BLD: 298 K/CU MM (ref 140–440)
PLATELET # BLD: 327 K/CU MM (ref 140–440)
PLATELET # BLD: 331 K/CU MM (ref 140–440)
PLT MORPHOLOGY: ABNORMAL
PLT MORPHOLOGY: ABNORMAL
PLT MORPHOLOGY: ADEQUATE
PLT MORPHOLOGY: ADEQUATE
PMV BLD AUTO: 11.6 FL (ref 7.5–11.1)
PMV BLD AUTO: 11.8 FL (ref 7.5–11.1)
PMV BLD AUTO: 11.9 FL (ref 7.5–11.1)
PMV BLD AUTO: 12 FL (ref 7.5–11.1)
PMV BLD AUTO: 12.1 FL (ref 7.5–11.1)
PMV BLD AUTO: 12.1 FL (ref 7.5–11.1)
PMV BLD AUTO: 12.2 FL (ref 7.5–11.1)
PMV BLD AUTO: 12.4 FL (ref 7.5–11.1)
PMV BLD AUTO: 12.6 FL (ref 7.5–11.1)
PMV BLD AUTO: 12.9 FL (ref 7.5–11.1)
PO2 ARTERIAL: 65 MMHG (ref 75–100)
PO2, VEN: 81 MMHG (ref 28–48)
POLYCHROMASIA: ABNORMAL
POTASSIUM SERPL-SCNC: 2.5 MMOL/L (ref 3.5–5.1)
POTASSIUM SERPL-SCNC: 2.5 MMOL/L (ref 3.5–5.1)
POTASSIUM SERPL-SCNC: 2.7 MMOL/L (ref 3.5–5.1)
POTASSIUM SERPL-SCNC: 2.9 MMOL/L (ref 3.5–5.1)
POTASSIUM SERPL-SCNC: 2.9 MMOL/L (ref 3.5–5.1)
POTASSIUM SERPL-SCNC: 3 MMOL/L (ref 3.5–5.1)
POTASSIUM SERPL-SCNC: 3 MMOL/L (ref 3.5–5.1)
POTASSIUM SERPL-SCNC: 3.1 MMOL/L (ref 3.5–5.1)
POTASSIUM SERPL-SCNC: 3.2 MMOL/L (ref 3.5–5.1)
POTASSIUM SERPL-SCNC: 3.2 MMOL/L (ref 3.5–5.1)
POTASSIUM SERPL-SCNC: 3.4 MMOL/L (ref 3.5–5.1)
POTASSIUM SERPL-SCNC: 3.5 MMOL/L (ref 3.5–5.1)
POTASSIUM SERPL-SCNC: 3.8 MMOL/L (ref 3.5–5.1)
POTASSIUM SERPL-SCNC: 3.8 MMOL/L (ref 3.5–5.1)
POTASSIUM SERPL-SCNC: 3.9 MMOL/L (ref 3.5–5.1)
POTASSIUM SERPL-SCNC: 3.9 MMOL/L (ref 3.5–5.1)
POTASSIUM SERPL-SCNC: NORMAL MMOL/L
PRO-BNP: 1722 PG/ML
PRO-BNP: 1907 PG/ML
PRO-BNP: 5357 PG/ML
PROCALCITONIN: 0.32
PROCALCITONIN: 0.56
PROCALCITONIN: 1.26
PROTEIN UA: NEGATIVE MG/DL
PROTHROMBIN TIME: 11.8 SECONDS (ref 11.7–14.5)
RBC # BLD: 3.41 M/CU MM (ref 4.6–6.2)
RBC # BLD: 3.47 M/CU MM (ref 4.6–6.2)
RBC # BLD: 3.53 M/CU MM (ref 4.6–6.2)
RBC # BLD: 3.53 M/CU MM (ref 4.6–6.2)
RBC # BLD: 3.55 M/CU MM (ref 4.6–6.2)
RBC # BLD: 4.02 M/CU MM (ref 4.6–6.2)
RBC # BLD: 4.14 M/CU MM (ref 4.6–6.2)
RBC # BLD: 4.43 M/CU MM (ref 4.6–6.2)
RBC # BLD: 4.5 M/CU MM (ref 4.6–6.2)
RBC # BLD: 4.55 M/CU MM (ref 4.6–6.2)
RBC # BLD: 4.72 M/CU MM (ref 4.6–6.2)
RBC # BLD: 4.73 M/CU MM (ref 4.6–6.2)
RBC # BLD: ABNORMAL 10*6/UL
RBC URINE: 3 /HPF (ref 0–3)
SARS-COV-2, NAAT: DETECTED
SEGMENTED NEUTROPHILS ABSOLUTE COUNT: 10.2 K/CU MM
SEGMENTED NEUTROPHILS ABSOLUTE COUNT: 10.2 K/CU MM
SEGMENTED NEUTROPHILS ABSOLUTE COUNT: 12 K/CU MM
SEGMENTED NEUTROPHILS ABSOLUTE COUNT: 12.1 K/CU MM
SEGMENTED NEUTROPHILS ABSOLUTE COUNT: 12.3 K/CU MM
SEGMENTED NEUTROPHILS ABSOLUTE COUNT: 13.4 K/CU MM
SEGMENTED NEUTROPHILS ABSOLUTE COUNT: 16.1 K/CU MM
SEGMENTED NEUTROPHILS ABSOLUTE COUNT: 5.7 K/CU MM
SEGMENTED NEUTROPHILS ABSOLUTE COUNT: 7.8 K/CU MM
SEGMENTED NEUTROPHILS ABSOLUTE COUNT: 8.8 K/CU MM
SEGMENTED NEUTROPHILS ABSOLUTE COUNT: 9.3 K/CU MM
SEGMENTED NEUTROPHILS ABSOLUTE COUNT: 9.8 K/CU MM
SEGMENTED NEUTROPHILS RELATIVE PERCENT: 48.9 % (ref 36–66)
SEGMENTED NEUTROPHILS RELATIVE PERCENT: 73 % (ref 36–66)
SEGMENTED NEUTROPHILS RELATIVE PERCENT: 79 % (ref 36–66)
SEGMENTED NEUTROPHILS RELATIVE PERCENT: 80 % (ref 36–66)
SEGMENTED NEUTROPHILS RELATIVE PERCENT: 80 % (ref 36–66)
SEGMENTED NEUTROPHILS RELATIVE PERCENT: 82 % (ref 36–66)
SEGMENTED NEUTROPHILS RELATIVE PERCENT: 85 % (ref 36–66)
SEGMENTED NEUTROPHILS RELATIVE PERCENT: 86 % (ref 36–66)
SEGMENTED NEUTROPHILS RELATIVE PERCENT: 86 % (ref 36–66)
SEGMENTED NEUTROPHILS RELATIVE PERCENT: 90 % (ref 36–66)
SEGMENTED NEUTROPHILS RELATIVE PERCENT: 90.7 % (ref 36–66)
SEGMENTED NEUTROPHILS RELATIVE PERCENT: 92 % (ref 36–66)
SODIUM BLD-SCNC: 132 MMOL/L (ref 135–145)
SODIUM BLD-SCNC: 133 MMOL/L (ref 135–145)
SODIUM BLD-SCNC: 133 MMOL/L (ref 135–145)
SODIUM BLD-SCNC: 134 MMOL/L (ref 135–145)
SODIUM BLD-SCNC: 135 MMOL/L (ref 135–145)
SODIUM BLD-SCNC: 138 MMOL/L (ref 135–145)
SODIUM BLD-SCNC: 139 MMOL/L (ref 135–145)
SODIUM BLD-SCNC: 140 MMOL/L (ref 135–145)
SODIUM BLD-SCNC: 140 MMOL/L (ref 135–145)
SODIUM BLD-SCNC: 141 MMOL/L (ref 135–145)
SODIUM BLD-SCNC: 142 MMOL/L (ref 135–145)
SODIUM BLD-SCNC: 143 MMOL/L (ref 135–145)
SODIUM BLD-SCNC: NORMAL MMOL/L
SOURCE: ABNORMAL
SOURCE: NORMAL
SPECIFIC GRAVITY UA: 1 (ref 1–1.03)
SPECIMEN: ABNORMAL
SPECIMEN: ABNORMAL
SPECIMEN: NORMAL
SQUAMOUS EPITHELIAL: 1 /HPF
STREP PNEUMONIAE ANTIGEN: NORMAL
T4 FREE: 1.09 NG/DL (ref 0.9–1.8)
TOTAL IMMATURE NEUTOROPHIL: 0.03 K/CU MM
TOTAL IMMATURE NEUTOROPHIL: 0.05 K/CU MM
TOTAL IMMATURE NEUTOROPHIL: 0.08 K/CU MM
TOTAL IMMATURE NEUTOROPHIL: 0.11 K/CU MM
TOTAL NUCLEATED RBC: 0 K/CU MM
TOTAL NUCLEATED RBC: 0.1 K/CU MM
TOTAL PROTEIN: 4.9 GM/DL (ref 6.4–8.2)
TOTAL PROTEIN: 4.9 GM/DL (ref 6.4–8.2)
TOTAL PROTEIN: 5 GM/DL (ref 6.4–8.2)
TOTAL PROTEIN: 5.1 GM/DL (ref 6.4–8.2)
TOTAL PROTEIN: 5.1 GM/DL (ref 6.4–8.2)
TOTAL PROTEIN: 5.2 GM/DL (ref 6.4–8.2)
TOTAL PROTEIN: 5.2 GM/DL (ref 6.4–8.2)
TOTAL PROTEIN: 5.3 GM/DL (ref 6.4–8.2)
TOTAL PROTEIN: 5.4 GM/DL (ref 6.4–8.2)
TOTAL PROTEIN: 5.6 GM/DL (ref 6.4–8.2)
TOTAL PROTEIN: 5.8 GM/DL (ref 6.4–8.2)
TOTAL PROTEIN: 6.5 GM/DL (ref 6.4–8.2)
TOXIC GRANULATION: PRESENT
TRICHOMONAS: ABNORMAL /HPF
TROPONIN T: <0.01 NG/ML
TROPONIN T: <0.01 NG/ML
TSH HIGH SENSITIVITY: 1.89 UIU/ML (ref 0.27–4.2)
UROBILINOGEN, URINE: NEGATIVE MG/DL (ref 0.2–1)
VITAMIN D 25-HYDROXY: 33.72 NG/ML
WBC # BLD: 10.3 K/CU MM (ref 4–10.5)
WBC # BLD: 10.6 K/CU MM (ref 4–10.5)
WBC # BLD: 10.8 K/CU MM (ref 4–10.5)
WBC # BLD: 11 K/CU MM (ref 4–10.5)
WBC # BLD: 11.1 K/CU MM (ref 4–10.5)
WBC # BLD: 11.6 K/CU MM (ref 4–10.5)
WBC # BLD: 11.9 K/CU MM (ref 4–10.5)
WBC # BLD: 14 K/CU MM (ref 4–10.5)
WBC # BLD: 15 K/CU MM (ref 4–10.5)
WBC # BLD: 15.1 K/CU MM (ref 4–10.5)
WBC # BLD: 15.8 K/CU MM (ref 4–10.5)
WBC # BLD: 20.3 K/CU MM (ref 4–10.5)
WBC UA: 1 /HPF (ref 0–2)

## 2021-01-01 PROCEDURE — 11042 DBRDMT SUBQ TIS 1ST 20SQCM/<: CPT

## 2021-01-01 PROCEDURE — 71045 X-RAY EXAM CHEST 1 VIEW: CPT

## 2021-01-01 PROCEDURE — 6370000000 HC RX 637 (ALT 250 FOR IP): Performed by: INTERNAL MEDICINE

## 2021-01-01 PROCEDURE — 2580000003 HC RX 258: Performed by: NURSE PRACTITIONER

## 2021-01-01 PROCEDURE — 82803 BLOOD GASES ANY COMBINATION: CPT

## 2021-01-01 PROCEDURE — 94761 N-INVAS EAR/PLS OXIMETRY MLT: CPT

## 2021-01-01 PROCEDURE — 6370000000 HC RX 637 (ALT 250 FOR IP): Performed by: PHYSICIAN ASSISTANT

## 2021-01-01 PROCEDURE — 2060000000 HC ICU INTERMEDIATE R&B

## 2021-01-01 PROCEDURE — 6360000002 HC RX W HCPCS: Performed by: INTERNAL MEDICINE

## 2021-01-01 PROCEDURE — 80053 COMPREHEN METABOLIC PANEL: CPT

## 2021-01-01 PROCEDURE — 96375 TX/PRO/DX INJ NEW DRUG ADDON: CPT

## 2021-01-01 PROCEDURE — 85730 THROMBOPLASTIN TIME PARTIAL: CPT

## 2021-01-01 PROCEDURE — 6360000002 HC RX W HCPCS: Performed by: EMERGENCY MEDICINE

## 2021-01-01 PROCEDURE — 2580000003 HC RX 258: Performed by: INTERNAL MEDICINE

## 2021-01-01 PROCEDURE — 84145 PROCALCITONIN (PCT): CPT

## 2021-01-01 PROCEDURE — 87449 NOS EACH ORGANISM AG IA: CPT

## 2021-01-01 PROCEDURE — 2700000000 HC OXYGEN THERAPY PER DAY

## 2021-01-01 PROCEDURE — 83735 ASSAY OF MAGNESIUM: CPT

## 2021-01-01 PROCEDURE — 83036 HEMOGLOBIN GLYCOSYLATED A1C: CPT

## 2021-01-01 PROCEDURE — 85007 BL SMEAR W/DIFF WBC COUNT: CPT

## 2021-01-01 PROCEDURE — 82962 GLUCOSE BLOOD TEST: CPT

## 2021-01-01 PROCEDURE — 82248 BILIRUBIN DIRECT: CPT

## 2021-01-01 PROCEDURE — 80076 HEPATIC FUNCTION PANEL: CPT

## 2021-01-01 PROCEDURE — 83880 ASSAY OF NATRIURETIC PEPTIDE: CPT

## 2021-01-01 PROCEDURE — 87075 CULTR BACTERIA EXCEPT BLOOD: CPT

## 2021-01-01 PROCEDURE — 85379 FIBRIN DEGRADATION QUANT: CPT

## 2021-01-01 PROCEDURE — 99285 EMERGENCY DEPT VISIT HI MDM: CPT

## 2021-01-01 PROCEDURE — 11043 DBRDMT MUSC&/FSCA 1ST 20/<: CPT

## 2021-01-01 PROCEDURE — 2500000003 HC RX 250 WO HCPCS: Performed by: INTERNAL MEDICINE

## 2021-01-01 PROCEDURE — 36415 COLL VENOUS BLD VENIPUNCTURE: CPT

## 2021-01-01 PROCEDURE — 84100 ASSAY OF PHOSPHORUS: CPT

## 2021-01-01 PROCEDURE — 36556 INSERT NON-TUNNEL CV CATH: CPT

## 2021-01-01 PROCEDURE — 93010 ELECTROCARDIOGRAM REPORT: CPT | Performed by: INTERNAL MEDICINE

## 2021-01-01 PROCEDURE — 84484 ASSAY OF TROPONIN QUANT: CPT

## 2021-01-01 PROCEDURE — 92526 ORAL FUNCTION THERAPY: CPT

## 2021-01-01 PROCEDURE — 2580000003 HC RX 258: Performed by: EMERGENCY MEDICINE

## 2021-01-01 PROCEDURE — 87635 SARS-COV-2 COVID-19 AMP PRB: CPT

## 2021-01-01 PROCEDURE — 2500000003 HC RX 250 WO HCPCS: Performed by: NURSE PRACTITIONER

## 2021-01-01 PROCEDURE — 93306 TTE W/DOPPLER COMPLETE: CPT

## 2021-01-01 PROCEDURE — 80048 BASIC METABOLIC PNL TOTAL CA: CPT

## 2021-01-01 PROCEDURE — 86141 C-REACTIVE PROTEIN HS: CPT

## 2021-01-01 PROCEDURE — C1751 CATH, INF, PER/CENT/MIDLINE: HCPCS

## 2021-01-01 PROCEDURE — 85025 COMPLETE CBC W/AUTO DIFF WBC: CPT

## 2021-01-01 PROCEDURE — 96365 THER/PROPH/DIAG IV INF INIT: CPT

## 2021-01-01 PROCEDURE — 83615 LACTATE (LD) (LDH) ENZYME: CPT

## 2021-01-01 PROCEDURE — 76937 US GUIDE VASCULAR ACCESS: CPT

## 2021-01-01 PROCEDURE — 83605 ASSAY OF LACTIC ACID: CPT

## 2021-01-01 PROCEDURE — 96367 TX/PROPH/DG ADDL SEQ IV INF: CPT

## 2021-01-01 PROCEDURE — 85027 COMPLETE CBC AUTOMATED: CPT

## 2021-01-01 PROCEDURE — C1894 INTRO/SHEATH, NON-LASER: HCPCS

## 2021-01-01 PROCEDURE — 85384 FIBRINOGEN ACTIVITY: CPT

## 2021-01-01 PROCEDURE — 82805 BLOOD GASES W/O2 SATURATION: CPT

## 2021-01-01 PROCEDURE — 92610 EVALUATE SWALLOWING FUNCTION: CPT

## 2021-01-01 PROCEDURE — 80069 RENAL FUNCTION PANEL: CPT

## 2021-01-01 PROCEDURE — 36592 COLLECT BLOOD FROM PICC: CPT

## 2021-01-01 PROCEDURE — 93005 ELECTROCARDIOGRAM TRACING: CPT | Performed by: EMERGENCY MEDICINE

## 2021-01-01 PROCEDURE — 87070 CULTURE OTHR SPECIMN AEROBIC: CPT

## 2021-01-01 PROCEDURE — 82728 ASSAY OF FERRITIN: CPT

## 2021-01-01 PROCEDURE — 2709999900 HC NON-CHARGEABLE SUPPLY

## 2021-01-01 PROCEDURE — 87077 CULTURE AEROBIC IDENTIFY: CPT

## 2021-01-01 PROCEDURE — 11042 DBRDMT SUBQ TIS 1ST 20SQCM/<: CPT | Performed by: SURGERY

## 2021-01-01 PROCEDURE — 11042 DBRDMT SUBQ TIS 1ST 20SQCM/<: CPT | Performed by: NURSE PRACTITIONER

## 2021-01-01 PROCEDURE — 87205 SMEAR GRAM STAIN: CPT

## 2021-01-01 PROCEDURE — 99214 OFFICE O/P EST MOD 30 MIN: CPT

## 2021-01-01 PROCEDURE — 6370000000 HC RX 637 (ALT 250 FOR IP): Performed by: NURSE PRACTITIONER

## 2021-01-01 PROCEDURE — 70450 CT HEAD/BRAIN W/O DYE: CPT

## 2021-01-01 PROCEDURE — 84132 ASSAY OF SERUM POTASSIUM: CPT

## 2021-01-01 PROCEDURE — 97597 DBRDMT OPN WND 1ST 20 CM/<: CPT

## 2021-01-01 PROCEDURE — XW0DXM6 INTRODUCTION OF BARICITINIB INTO MOUTH AND PHARYNX, EXTERNAL APPROACH, NEW TECHNOLOGY GROUP 6: ICD-10-PCS | Performed by: INTERNAL MEDICINE

## 2021-01-01 PROCEDURE — 85610 PROTHROMBIN TIME: CPT

## 2021-01-01 PROCEDURE — 93005 ELECTROCARDIOGRAM TRACING: CPT | Performed by: INTERNAL MEDICINE

## 2021-01-01 PROCEDURE — 99203 OFFICE O/P NEW LOW 30 MIN: CPT | Performed by: NURSE PRACTITIONER

## 2021-01-01 PROCEDURE — 84439 ASSAY OF FREE THYROXINE: CPT

## 2021-01-01 PROCEDURE — 87324 CLOSTRIDIUM AG IA: CPT

## 2021-01-01 PROCEDURE — 81001 URINALYSIS AUTO W/SCOPE: CPT

## 2021-01-01 PROCEDURE — 94660 CPAP INITIATION&MGMT: CPT

## 2021-01-01 PROCEDURE — 87899 AGENT NOS ASSAY W/OPTIC: CPT

## 2021-01-01 PROCEDURE — 6370000000 HC RX 637 (ALT 250 FOR IP): Performed by: EMERGENCY MEDICINE

## 2021-01-01 PROCEDURE — 82306 VITAMIN D 25 HYDROXY: CPT

## 2021-01-01 PROCEDURE — 92526 ORAL FUNCTION THERAPY: CPT | Performed by: SPEECH-LANGUAGE PATHOLOGIST

## 2021-01-01 PROCEDURE — P9045 ALBUMIN (HUMAN), 5%, 250 ML: HCPCS | Performed by: INTERNAL MEDICINE

## 2021-01-01 PROCEDURE — 84443 ASSAY THYROID STIM HORMONE: CPT

## 2021-01-01 RX ORDER — PANTOPRAZOLE SODIUM 40 MG/1
40 TABLET, DELAYED RELEASE ORAL DAILY
Status: DISCONTINUED | OUTPATIENT
Start: 2021-01-01 | End: 2021-01-01 | Stop reason: HOSPADM

## 2021-01-01 RX ORDER — NICOTINE POLACRILEX 4 MG
15 LOZENGE BUCCAL PRN
Status: DISCONTINUED | OUTPATIENT
Start: 2021-01-01 | End: 2021-01-01 | Stop reason: HOSPADM

## 2021-01-01 RX ORDER — DEXTROSE MONOHYDRATE 25 G/50ML
12.5 INJECTION, SOLUTION INTRAVENOUS PRN
Status: DISCONTINUED | OUTPATIENT
Start: 2021-01-01 | End: 2021-01-01 | Stop reason: HOSPADM

## 2021-01-01 RX ORDER — LIDOCAINE HYDROCHLORIDE 40 MG/ML
SOLUTION TOPICAL ONCE
Status: DISCONTINUED | OUTPATIENT
Start: 2021-01-01 | End: 2021-01-01 | Stop reason: HOSPADM

## 2021-01-01 RX ORDER — CLOBETASOL PROPIONATE 0.5 MG/G
OINTMENT TOPICAL ONCE
Status: CANCELLED | OUTPATIENT
Start: 2021-01-01 | End: 2021-01-01

## 2021-01-01 RX ORDER — BACITRACIN ZINC AND POLYMYXIN B SULFATE 500; 1000 [USP'U]/G; [USP'U]/G
OINTMENT TOPICAL ONCE
Status: CANCELLED | OUTPATIENT
Start: 2021-01-01 | End: 2021-01-01

## 2021-01-01 RX ORDER — BACITRACIN, NEOMYCIN, POLYMYXIN B 400; 3.5; 5 [USP'U]/G; MG/G; [USP'U]/G
OINTMENT TOPICAL ONCE
Status: CANCELLED | OUTPATIENT
Start: 2021-01-01 | End: 2021-01-01

## 2021-01-01 RX ORDER — DIGOXIN 125 MCG
125 TABLET ORAL DAILY
Status: DISCONTINUED | OUTPATIENT
Start: 2021-01-01 | End: 2021-01-01 | Stop reason: HOSPADM

## 2021-01-01 RX ORDER — LIDOCAINE 40 MG/G
CREAM TOPICAL ONCE
Status: CANCELLED | OUTPATIENT
Start: 2021-01-01 | End: 2021-01-01

## 2021-01-01 RX ORDER — BETAMETHASONE DIPROPIONATE 0.05 %
OINTMENT (GRAM) TOPICAL ONCE
Status: CANCELLED | OUTPATIENT
Start: 2021-01-01 | End: 2021-01-01

## 2021-01-01 RX ORDER — LIDOCAINE 50 MG/G
OINTMENT TOPICAL ONCE
Status: CANCELLED | OUTPATIENT
Start: 2021-01-01 | End: 2021-01-01

## 2021-01-01 RX ORDER — GENTAMICIN SULFATE 1 MG/G
OINTMENT TOPICAL ONCE
Status: CANCELLED | OUTPATIENT
Start: 2021-01-01 | End: 2021-01-01

## 2021-01-01 RX ORDER — LEVOFLOXACIN 750 MG/1
750 TABLET ORAL DAILY
Qty: 7 TABLET | Refills: 0 | Status: SHIPPED | OUTPATIENT
Start: 2021-01-01 | End: 2021-01-01

## 2021-01-01 RX ORDER — POLYETHYLENE GLYCOL 3350 17 G/17G
17 POWDER, FOR SOLUTION ORAL DAILY PRN
Status: DISCONTINUED | OUTPATIENT
Start: 2021-01-01 | End: 2021-01-01 | Stop reason: HOSPADM

## 2021-01-01 RX ORDER — DEXAMETHASONE 4 MG/1
6 TABLET ORAL DAILY
Status: COMPLETED | OUTPATIENT
Start: 2021-01-01 | End: 2021-01-01

## 2021-01-01 RX ORDER — POTASSIUM CHLORIDE 20 MEQ/1
20 TABLET, EXTENDED RELEASE ORAL 2 TIMES DAILY
Status: DISCONTINUED | OUTPATIENT
Start: 2021-01-01 | End: 2021-01-01 | Stop reason: HOSPADM

## 2021-01-01 RX ORDER — INSULIN GLARGINE 100 [IU]/ML
15 INJECTION, SOLUTION SUBCUTANEOUS ONCE
Status: COMPLETED | OUTPATIENT
Start: 2021-01-01 | End: 2021-01-01

## 2021-01-01 RX ORDER — POTASSIUM CHLORIDE 7.45 MG/ML
10 INJECTION INTRAVENOUS PRN
Status: DISCONTINUED | OUTPATIENT
Start: 2021-01-01 | End: 2021-01-01 | Stop reason: HOSPADM

## 2021-01-01 RX ORDER — LIDOCAINE HYDROCHLORIDE 20 MG/ML
JELLY TOPICAL ONCE
Status: CANCELLED | OUTPATIENT
Start: 2021-01-01 | End: 2021-01-01

## 2021-01-01 RX ORDER — SULFAMETHOXAZOLE AND TRIMETHOPRIM 800; 160 MG/1; MG/1
1 TABLET ORAL 2 TIMES DAILY
Qty: 28 TABLET | Refills: 0 | Status: SHIPPED | OUTPATIENT
Start: 2021-01-01 | End: 2021-01-01

## 2021-01-01 RX ORDER — LIDOCAINE HYDROCHLORIDE 40 MG/ML
SOLUTION TOPICAL ONCE
Status: CANCELLED | OUTPATIENT
Start: 2021-01-01 | End: 2021-01-01

## 2021-01-01 RX ORDER — SERTRALINE HYDROCHLORIDE 100 MG/1
100 TABLET, FILM COATED ORAL DAILY
Status: DISCONTINUED | OUTPATIENT
Start: 2021-01-01 | End: 2021-01-01 | Stop reason: HOSPADM

## 2021-01-01 RX ORDER — SPIRONOLACTONE 25 MG/1
25 TABLET ORAL DAILY
Status: DISCONTINUED | OUTPATIENT
Start: 2021-01-01 | End: 2021-01-01

## 2021-01-01 RX ORDER — DILTIAZEM HYDROCHLORIDE 60 MG/1
60 TABLET, FILM COATED ORAL 3 TIMES DAILY
Status: DISCONTINUED | OUTPATIENT
Start: 2021-01-01 | End: 2021-01-01

## 2021-01-01 RX ORDER — LIDOCAINE HYDROCHLORIDE 10 MG/ML
5 INJECTION, SOLUTION EPIDURAL; INFILTRATION; INTRACAUDAL; PERINEURAL ONCE
Status: DISCONTINUED | OUTPATIENT
Start: 2021-01-01 | End: 2021-01-01 | Stop reason: HOSPADM

## 2021-01-01 RX ORDER — GINSENG 100 MG
CAPSULE ORAL ONCE
Status: CANCELLED | OUTPATIENT
Start: 2021-01-01 | End: 2021-01-01

## 2021-01-01 RX ORDER — SULFAMETHOXAZOLE AND TRIMETHOPRIM 200; 40 MG/5ML; MG/5ML
160 SUSPENSION ORAL 2 TIMES DAILY
COMMUNITY

## 2021-01-01 RX ORDER — BUMETANIDE 0.25 MG/ML
2 INJECTION, SOLUTION INTRAMUSCULAR; INTRAVENOUS ONCE
Status: COMPLETED | OUTPATIENT
Start: 2021-01-01 | End: 2021-01-01

## 2021-01-01 RX ORDER — ACETAMINOPHEN 650 MG/1
650 SUPPOSITORY RECTAL EVERY 6 HOURS PRN
Status: DISCONTINUED | OUTPATIENT
Start: 2021-01-01 | End: 2021-01-01 | Stop reason: HOSPADM

## 2021-01-01 RX ORDER — OXYCODONE HYDROCHLORIDE AND ACETAMINOPHEN 5; 325 MG/1; MG/1
1 TABLET ORAL EVERY 4 HOURS PRN
COMMUNITY

## 2021-01-01 RX ORDER — INSULIN GLARGINE 100 [IU]/ML
25 INJECTION, SOLUTION SUBCUTANEOUS NIGHTLY
Status: DISCONTINUED | OUTPATIENT
Start: 2021-01-01 | End: 2021-01-01

## 2021-01-01 RX ORDER — PRAVASTATIN SODIUM 40 MG
40 TABLET ORAL DAILY
Status: DISCONTINUED | OUTPATIENT
Start: 2021-01-01 | End: 2021-01-01

## 2021-01-01 RX ORDER — DILTIAZEM HYDROCHLORIDE 5 MG/ML
5 INJECTION INTRAVENOUS ONCE
Status: COMPLETED | OUTPATIENT
Start: 2021-01-01 | End: 2021-01-01

## 2021-01-01 RX ORDER — FUROSEMIDE 10 MG/ML
40 INJECTION INTRAMUSCULAR; INTRAVENOUS ONCE
Status: COMPLETED | OUTPATIENT
Start: 2021-01-01 | End: 2021-01-01

## 2021-01-01 RX ORDER — POTASSIUM CHLORIDE 20 MEQ/1
40 TABLET, EXTENDED RELEASE ORAL PRN
Status: DISCONTINUED | OUTPATIENT
Start: 2021-01-01 | End: 2021-01-01 | Stop reason: HOSPADM

## 2021-01-01 RX ORDER — INSULIN GLARGINE 100 [IU]/ML
30 INJECTION, SOLUTION SUBCUTANEOUS NIGHTLY
Status: DISCONTINUED | OUTPATIENT
Start: 2021-01-01 | End: 2021-01-01

## 2021-01-01 RX ORDER — ALBUMIN, HUMAN INJ 5% 5 %
25 SOLUTION INTRAVENOUS ONCE
Status: COMPLETED | OUTPATIENT
Start: 2021-01-01 | End: 2021-01-01

## 2021-01-01 RX ORDER — SODIUM CHLORIDE 0.9 % (FLUSH) 0.9 %
5-40 SYRINGE (ML) INJECTION EVERY 12 HOURS SCHEDULED
Status: DISCONTINUED | OUTPATIENT
Start: 2021-01-01 | End: 2021-01-01 | Stop reason: HOSPADM

## 2021-01-01 RX ORDER — INSULIN GLARGINE 100 [IU]/ML
35 INJECTION, SOLUTION SUBCUTANEOUS NIGHTLY
Status: DISCONTINUED | OUTPATIENT
Start: 2021-01-01 | End: 2021-01-01

## 2021-01-01 RX ORDER — GUAIFENESIN 600 MG/1
600 TABLET, EXTENDED RELEASE ORAL 2 TIMES DAILY
Status: DISCONTINUED | OUTPATIENT
Start: 2021-01-01 | End: 2021-01-01 | Stop reason: HOSPADM

## 2021-01-01 RX ORDER — FUROSEMIDE 10 MG/ML
40 INJECTION INTRAMUSCULAR; INTRAVENOUS ONCE
Status: DISCONTINUED | OUTPATIENT
Start: 2021-01-01 | End: 2021-01-01

## 2021-01-01 RX ORDER — POTASSIUM CHLORIDE 7.45 MG/ML
10 INJECTION INTRAVENOUS ONCE
Status: COMPLETED | OUTPATIENT
Start: 2021-01-01 | End: 2021-01-01

## 2021-01-01 RX ORDER — 0.9 % SODIUM CHLORIDE 0.9 %
500 INTRAVENOUS SOLUTION INTRAVENOUS ONCE
Status: COMPLETED | OUTPATIENT
Start: 2021-01-01 | End: 2021-01-01

## 2021-01-01 RX ORDER — DEXAMETHASONE SODIUM PHOSPHATE 10 MG/ML
6 INJECTION, SOLUTION INTRAMUSCULAR; INTRAVENOUS ONCE
Status: COMPLETED | OUTPATIENT
Start: 2021-01-01 | End: 2021-01-01

## 2021-01-01 RX ORDER — POTASSIUM CHLORIDE AND SODIUM CHLORIDE 450; 150 MG/100ML; MG/100ML
INJECTION, SOLUTION INTRAVENOUS CONTINUOUS
Status: DISCONTINUED | OUTPATIENT
Start: 2021-01-01 | End: 2021-01-01

## 2021-01-01 RX ORDER — SODIUM CHLORIDE 9 MG/ML
25 INJECTION, SOLUTION INTRAVENOUS PRN
Status: DISCONTINUED | OUTPATIENT
Start: 2021-01-01 | End: 2021-01-01 | Stop reason: HOSPADM

## 2021-01-01 RX ORDER — ONDANSETRON 2 MG/ML
4 INJECTION INTRAMUSCULAR; INTRAVENOUS EVERY 6 HOURS PRN
Status: DISCONTINUED | OUTPATIENT
Start: 2021-01-01 | End: 2021-01-01 | Stop reason: HOSPADM

## 2021-01-01 RX ORDER — SODIUM CHLORIDE 0.9 % (FLUSH) 0.9 %
5-40 SYRINGE (ML) INJECTION PRN
Status: DISCONTINUED | OUTPATIENT
Start: 2021-01-01 | End: 2021-01-01 | Stop reason: HOSPADM

## 2021-01-01 RX ORDER — LISINOPRIL 2.5 MG/1
2.5 TABLET ORAL DAILY
Status: DISCONTINUED | OUTPATIENT
Start: 2021-01-01 | End: 2021-01-01

## 2021-01-01 RX ORDER — ONDANSETRON 4 MG/1
4 TABLET, ORALLY DISINTEGRATING ORAL EVERY 8 HOURS PRN
Status: DISCONTINUED | OUTPATIENT
Start: 2021-01-01 | End: 2021-01-01 | Stop reason: HOSPADM

## 2021-01-01 RX ORDER — DEXTROSE MONOHYDRATE 50 MG/ML
100 INJECTION, SOLUTION INTRAVENOUS PRN
Status: DISCONTINUED | OUTPATIENT
Start: 2021-01-01 | End: 2021-01-01 | Stop reason: HOSPADM

## 2021-01-01 RX ORDER — INSULIN GLARGINE 100 [IU]/ML
20 INJECTION, SOLUTION SUBCUTANEOUS NIGHTLY
Status: DISCONTINUED | OUTPATIENT
Start: 2021-01-01 | End: 2021-01-01

## 2021-01-01 RX ORDER — ACETAMINOPHEN 325 MG/1
650 TABLET ORAL EVERY 6 HOURS PRN
Status: DISCONTINUED | OUTPATIENT
Start: 2021-01-01 | End: 2021-01-01 | Stop reason: HOSPADM

## 2021-01-01 RX ORDER — DEXTROSE MONOHYDRATE 100 MG/ML
INJECTION, SOLUTION INTRAVENOUS CONTINUOUS
Status: DISCONTINUED | OUTPATIENT
Start: 2021-01-01 | End: 2021-01-01

## 2021-01-01 RX ORDER — MAGNESIUM OXIDE 400 MG/1
400 TABLET ORAL DAILY
Status: DISCONTINUED | OUTPATIENT
Start: 2021-01-01 | End: 2021-01-01 | Stop reason: HOSPADM

## 2021-01-01 RX ORDER — MIDODRINE HYDROCHLORIDE 5 MG/1
10 TABLET ORAL
Status: DISCONTINUED | OUTPATIENT
Start: 2021-01-01 | End: 2021-01-01 | Stop reason: HOSPADM

## 2021-01-01 RX ORDER — TAMSULOSIN HYDROCHLORIDE 0.4 MG/1
0.4 CAPSULE ORAL DAILY
Status: DISCONTINUED | OUTPATIENT
Start: 2021-01-01 | End: 2021-01-01 | Stop reason: HOSPADM

## 2021-01-01 RX ORDER — LIDOCAINE 50 MG/G
OINTMENT TOPICAL ONCE
Status: DISCONTINUED | OUTPATIENT
Start: 2021-01-01 | End: 2021-01-01 | Stop reason: HOSPADM

## 2021-01-01 RX ORDER — LEVOFLOXACIN 750 MG/1
750 TABLET ORAL DAILY
Qty: 10 TABLET | Refills: 0 | Status: SHIPPED | OUTPATIENT
Start: 2021-01-01 | End: 2021-01-01

## 2021-01-01 RX ORDER — MAGNESIUM SULFATE IN WATER 40 MG/ML
2000 INJECTION, SOLUTION INTRAVENOUS ONCE
Status: COMPLETED | OUTPATIENT
Start: 2021-01-01 | End: 2021-01-01

## 2021-01-01 RX ORDER — AMIODARONE HYDROCHLORIDE 200 MG/1
200 TABLET ORAL DAILY
Status: DISCONTINUED | OUTPATIENT
Start: 2021-01-01 | End: 2021-01-01 | Stop reason: HOSPADM

## 2021-01-01 RX ORDER — NOREPINEPHRINE BIT/0.9 % NACL 16MG/250ML
2-100 INFUSION BOTTLE (ML) INTRAVENOUS CONTINUOUS
Status: DISCONTINUED | OUTPATIENT
Start: 2021-01-01 | End: 2021-01-01 | Stop reason: CLARIF

## 2021-01-01 RX ORDER — DILTIAZEM HYDROCHLORIDE 5 MG/ML
10 INJECTION INTRAVENOUS ONCE
Status: DISCONTINUED | OUTPATIENT
Start: 2021-01-01 | End: 2021-01-01

## 2021-01-01 RX ORDER — MAGNESIUM SULFATE 1 G/100ML
1000 INJECTION INTRAVENOUS ONCE
Status: COMPLETED | OUTPATIENT
Start: 2021-01-01 | End: 2021-01-01

## 2021-01-01 RX ORDER — INSULIN GLARGINE 100 [IU]/ML
15 INJECTION, SOLUTION SUBCUTANEOUS NIGHTLY
Status: DISCONTINUED | OUTPATIENT
Start: 2021-01-01 | End: 2021-01-01 | Stop reason: HOSPADM

## 2021-01-01 RX ORDER — INSULIN GLARGINE 100 [IU]/ML
15 INJECTION, SOLUTION SUBCUTANEOUS NIGHTLY
Status: DISCONTINUED | OUTPATIENT
Start: 2021-01-01 | End: 2021-01-01

## 2021-01-01 RX ADMIN — POTASSIUM CHLORIDE 10 MEQ: 7.46 INJECTION, SOLUTION INTRAVENOUS at 06:20

## 2021-01-01 RX ADMIN — SODIUM PHOSPHATE, MONOBASIC, MONOHYDRATE 20 MMOL: 276; 142 INJECTION, SOLUTION INTRAVENOUS at 14:03

## 2021-01-01 RX ADMIN — CEFTRIAXONE 1000 MG: 1 INJECTION, POWDER, FOR SOLUTION INTRAMUSCULAR; INTRAVENOUS at 09:29

## 2021-01-01 RX ADMIN — TAMSULOSIN HYDROCHLORIDE 0.4 MG: 0.4 CAPSULE ORAL at 07:48

## 2021-01-01 RX ADMIN — AZITHROMYCIN DIHYDRATE 500 MG: 500 INJECTION, POWDER, LYOPHILIZED, FOR SOLUTION INTRAVENOUS at 11:38

## 2021-01-01 RX ADMIN — ACETAMINOPHEN 650 MG: 325 TABLET ORAL at 09:26

## 2021-01-01 RX ADMIN — APIXABAN 5 MG: 5 TABLET, FILM COATED ORAL at 14:26

## 2021-01-01 RX ADMIN — APIXABAN 5 MG: 5 TABLET, FILM COATED ORAL at 20:09

## 2021-01-01 RX ADMIN — AMIODARONE HYDROCHLORIDE 200 MG: 200 TABLET ORAL at 09:19

## 2021-01-01 RX ADMIN — BUMETANIDE 2 MG: 0.25 INJECTION, SOLUTION INTRAMUSCULAR; INTRAVENOUS at 14:18

## 2021-01-01 RX ADMIN — MAGNESIUM OXIDE 400 MG (241.3 MG MAGNESIUM) TABLET 400 MG: TABLET at 11:34

## 2021-01-01 RX ADMIN — INSULIN GLARGINE 35 UNITS: 100 INJECTION, SOLUTION SUBCUTANEOUS at 20:17

## 2021-01-01 RX ADMIN — DILTIAZEM HYDROCHLORIDE 60 MG: 60 TABLET, FILM COATED ORAL at 21:55

## 2021-01-01 RX ADMIN — GUAIFENESIN 600 MG: 600 TABLET, EXTENDED RELEASE ORAL at 09:28

## 2021-01-01 RX ADMIN — ACETAMINOPHEN 650 MG: 325 TABLET ORAL at 21:54

## 2021-01-01 RX ADMIN — METOPROLOL TARTRATE 25 MG: 25 TABLET, FILM COATED ORAL at 08:11

## 2021-01-01 RX ADMIN — INSULIN LISPRO 5 UNITS: 100 INJECTION, SOLUTION INTRAVENOUS; SUBCUTANEOUS at 08:39

## 2021-01-01 RX ADMIN — POTASSIUM CHLORIDE 10 MEQ: 7.46 INJECTION, SOLUTION INTRAVENOUS at 18:32

## 2021-01-01 RX ADMIN — SODIUM CHLORIDE, PRESERVATIVE FREE 10 ML: 5 INJECTION INTRAVENOUS at 23:27

## 2021-01-01 RX ADMIN — TAMSULOSIN HYDROCHLORIDE 0.4 MG: 0.4 CAPSULE ORAL at 08:00

## 2021-01-01 RX ADMIN — SODIUM CHLORIDE, PRESERVATIVE FREE 10 ML: 5 INJECTION INTRAVENOUS at 22:02

## 2021-01-01 RX ADMIN — BARICITINIB 4 MG: 2 TABLET, FILM COATED ORAL at 08:23

## 2021-01-01 RX ADMIN — APIXABAN 5 MG: 5 TABLET, FILM COATED ORAL at 09:18

## 2021-01-01 RX ADMIN — LISINOPRIL 2.5 MG: 2.5 TABLET ORAL at 09:57

## 2021-01-01 RX ADMIN — POTASSIUM CHLORIDE 10 MEQ: 7.46 INJECTION, SOLUTION INTRAVENOUS at 12:41

## 2021-01-01 RX ADMIN — POTASSIUM CHLORIDE 10 MEQ: 7.46 INJECTION, SOLUTION INTRAVENOUS at 07:05

## 2021-01-01 RX ADMIN — SODIUM CHLORIDE, PRESERVATIVE FREE 10 ML: 5 INJECTION INTRAVENOUS at 20:09

## 2021-01-01 RX ADMIN — GUAIFENESIN 600 MG: 600 TABLET, EXTENDED RELEASE ORAL at 09:57

## 2021-01-01 RX ADMIN — SERTRALINE HYDROCHLORIDE 100 MG: 100 TABLET ORAL at 07:48

## 2021-01-01 RX ADMIN — GUAIFENESIN 600 MG: 600 TABLET, EXTENDED RELEASE ORAL at 22:01

## 2021-01-01 RX ADMIN — SODIUM CHLORIDE, PRESERVATIVE FREE 10 ML: 5 INJECTION INTRAVENOUS at 23:28

## 2021-01-01 RX ADMIN — INSULIN HUMAN 15 UNITS: 100 INJECTION, SUSPENSION SUBCUTANEOUS at 09:55

## 2021-01-01 RX ADMIN — SERTRALINE HYDROCHLORIDE 100 MG: 100 TABLET ORAL at 10:04

## 2021-01-01 RX ADMIN — POTASSIUM CHLORIDE 20 MEQ: 1500 TABLET, EXTENDED RELEASE ORAL at 11:34

## 2021-01-01 RX ADMIN — FUROSEMIDE 40 MG: 10 INJECTION, SOLUTION INTRAMUSCULAR; INTRAVENOUS at 09:14

## 2021-01-01 RX ADMIN — SPIRONOLACTONE 25 MG: 25 TABLET ORAL at 09:57

## 2021-01-01 RX ADMIN — SODIUM CHLORIDE, PRESERVATIVE FREE 10 ML: 5 INJECTION INTRAVENOUS at 22:34

## 2021-01-01 RX ADMIN — LISINOPRIL 2.5 MG: 2.5 TABLET ORAL at 14:03

## 2021-01-01 RX ADMIN — APIXABAN 5 MG: 5 TABLET, FILM COATED ORAL at 08:16

## 2021-01-01 RX ADMIN — SODIUM CHLORIDE, PRESERVATIVE FREE 10 ML: 5 INJECTION INTRAVENOUS at 22:23

## 2021-01-01 RX ADMIN — GUAIFENESIN 600 MG: 600 TABLET, EXTENDED RELEASE ORAL at 20:01

## 2021-01-01 RX ADMIN — MAGNESIUM OXIDE 400 MG (241.3 MG MAGNESIUM) TABLET 400 MG: TABLET at 12:03

## 2021-01-01 RX ADMIN — DILTIAZEM HYDROCHLORIDE 90 MG: 60 TABLET, FILM COATED ORAL at 22:27

## 2021-01-01 RX ADMIN — APIXABAN 5 MG: 5 TABLET, FILM COATED ORAL at 22:01

## 2021-01-01 RX ADMIN — POTASSIUM CHLORIDE 20 MEQ: 1500 TABLET, EXTENDED RELEASE ORAL at 22:27

## 2021-01-01 RX ADMIN — POTASSIUM CHLORIDE 10 MEQ: 7.46 INJECTION, SOLUTION INTRAVENOUS at 14:21

## 2021-01-01 RX ADMIN — SODIUM CHLORIDE, PRESERVATIVE FREE 10 ML: 5 INJECTION INTRAVENOUS at 22:27

## 2021-01-01 RX ADMIN — TAMSULOSIN HYDROCHLORIDE 0.4 MG: 0.4 CAPSULE ORAL at 08:23

## 2021-01-01 RX ADMIN — APIXABAN 5 MG: 5 TABLET, FILM COATED ORAL at 22:27

## 2021-01-01 RX ADMIN — SERTRALINE HYDROCHLORIDE 100 MG: 100 TABLET ORAL at 08:14

## 2021-01-01 RX ADMIN — POTASSIUM CHLORIDE 10 MEQ: 7.45 INJECTION INTRAVENOUS at 23:55

## 2021-01-01 RX ADMIN — APIXABAN 5 MG: 5 TABLET, FILM COATED ORAL at 20:01

## 2021-01-01 RX ADMIN — SODIUM CHLORIDE 25 ML: 9 INJECTION, SOLUTION INTRAVENOUS at 12:19

## 2021-01-01 RX ADMIN — PANTOPRAZOLE SODIUM 40 MG: 40 TABLET, DELAYED RELEASE ORAL at 06:08

## 2021-01-01 RX ADMIN — LISINOPRIL 2.5 MG: 2.5 TABLET ORAL at 08:23

## 2021-01-01 RX ADMIN — POTASSIUM CHLORIDE AND SODIUM CHLORIDE: 450; 150 INJECTION, SOLUTION INTRAVENOUS at 21:22

## 2021-01-01 RX ADMIN — GUAIFENESIN 600 MG: 600 TABLET, EXTENDED RELEASE ORAL at 20:15

## 2021-01-01 RX ADMIN — GUAIFENESIN 600 MG: 600 TABLET, EXTENDED RELEASE ORAL at 22:27

## 2021-01-01 RX ADMIN — POTASSIUM PHOSPHATE, MONOBASIC AND POTASSIUM PHOSPHATE, DIBASIC 15 MMOL: 224; 236 INJECTION, SOLUTION, CONCENTRATE INTRAVENOUS at 10:01

## 2021-01-01 RX ADMIN — POTASSIUM CHLORIDE 10 MEQ: 7.46 INJECTION, SOLUTION INTRAVENOUS at 14:42

## 2021-01-01 RX ADMIN — APIXABAN 5 MG: 5 TABLET, FILM COATED ORAL at 08:00

## 2021-01-01 RX ADMIN — DEXAMETHASONE 6 MG: 4 TABLET ORAL at 09:54

## 2021-01-01 RX ADMIN — METOPROLOL TARTRATE 25 MG: 25 TABLET, FILM COATED ORAL at 08:23

## 2021-01-01 RX ADMIN — SODIUM CHLORIDE, PRESERVATIVE FREE 10 ML: 5 INJECTION INTRAVENOUS at 10:04

## 2021-01-01 RX ADMIN — SERTRALINE HYDROCHLORIDE 100 MG: 100 TABLET ORAL at 09:53

## 2021-01-01 RX ADMIN — DIGOXIN 125 MCG: 125 TABLET ORAL at 08:02

## 2021-01-01 RX ADMIN — INSULIN GLARGINE 30 UNITS: 100 INJECTION, SOLUTION SUBCUTANEOUS at 21:46

## 2021-01-01 RX ADMIN — GUAIFENESIN 600 MG: 600 TABLET, EXTENDED RELEASE ORAL at 21:55

## 2021-01-01 RX ADMIN — SERTRALINE HYDROCHLORIDE 100 MG: 100 TABLET ORAL at 11:28

## 2021-01-01 RX ADMIN — GUAIFENESIN 600 MG: 600 TABLET, EXTENDED RELEASE ORAL at 20:20

## 2021-01-01 RX ADMIN — INSULIN GLARGINE 25 UNITS: 100 INJECTION, SOLUTION SUBCUTANEOUS at 20:13

## 2021-01-01 RX ADMIN — BARICITINIB 4 MG: 2 TABLET, FILM COATED ORAL at 07:48

## 2021-01-01 RX ADMIN — POTASSIUM CHLORIDE 10 MEQ: 7.46 INJECTION, SOLUTION INTRAVENOUS at 18:14

## 2021-01-01 RX ADMIN — POTASSIUM CHLORIDE 10 MEQ: 7.46 INJECTION, SOLUTION INTRAVENOUS at 05:14

## 2021-01-01 RX ADMIN — DEXAMETHASONE 6 MG: 4 TABLET ORAL at 14:25

## 2021-01-01 RX ADMIN — PANTOPRAZOLE SODIUM 40 MG: 40 TABLET, DELAYED RELEASE ORAL at 05:19

## 2021-01-01 RX ADMIN — AMIODARONE HYDROCHLORIDE 200 MG: 200 TABLET ORAL at 08:15

## 2021-01-01 RX ADMIN — METOPROLOL TARTRATE 25 MG: 25 TABLET, FILM COATED ORAL at 20:01

## 2021-01-01 RX ADMIN — DILTIAZEM HYDROCHLORIDE 5 MG/HR: 5 INJECTION INTRAVENOUS at 09:46

## 2021-01-01 RX ADMIN — MAGNESIUM OXIDE 400 MG (241.3 MG MAGNESIUM) TABLET 400 MG: TABLET at 09:18

## 2021-01-01 RX ADMIN — SODIUM CHLORIDE, PRESERVATIVE FREE 10 ML: 5 INJECTION INTRAVENOUS at 08:12

## 2021-01-01 RX ADMIN — BARICITINIB 4 MG: 2 TABLET, FILM COATED ORAL at 09:53

## 2021-01-01 RX ADMIN — AMIODARONE HYDROCHLORIDE 200 MG: 200 TABLET ORAL at 11:28

## 2021-01-01 RX ADMIN — POTASSIUM CHLORIDE AND SODIUM CHLORIDE: 450; 150 INJECTION, SOLUTION INTRAVENOUS at 12:11

## 2021-01-01 RX ADMIN — INSULIN GLARGINE 25 UNITS: 100 INJECTION, SOLUTION SUBCUTANEOUS at 20:21

## 2021-01-01 RX ADMIN — POTASSIUM CHLORIDE 10 MEQ: 7.46 INJECTION, SOLUTION INTRAVENOUS at 20:49

## 2021-01-01 RX ADMIN — SODIUM CHLORIDE, PRESERVATIVE FREE 10 ML: 5 INJECTION INTRAVENOUS at 20:35

## 2021-01-01 RX ADMIN — MIDODRINE HYDROCHLORIDE 10 MG: 5 TABLET ORAL at 15:32

## 2021-01-01 RX ADMIN — METOPROLOL TARTRATE 25 MG: 25 TABLET, FILM COATED ORAL at 20:10

## 2021-01-01 RX ADMIN — SERTRALINE HYDROCHLORIDE 100 MG: 100 TABLET ORAL at 14:23

## 2021-01-01 RX ADMIN — BARICITINIB 4 MG: 2 TABLET, FILM COATED ORAL at 14:26

## 2021-01-01 RX ADMIN — DEXTROSE MONOHYDRATE 12.5 G: 500 INJECTION PARENTERAL at 10:16

## 2021-01-01 RX ADMIN — SERTRALINE HYDROCHLORIDE 100 MG: 100 TABLET ORAL at 08:02

## 2021-01-01 RX ADMIN — PANTOPRAZOLE SODIUM 40 MG: 40 TABLET, DELAYED RELEASE ORAL at 07:09

## 2021-01-01 RX ADMIN — SERTRALINE HYDROCHLORIDE 100 MG: 100 TABLET ORAL at 08:10

## 2021-01-01 RX ADMIN — GUAIFENESIN 600 MG: 600 TABLET, EXTENDED RELEASE ORAL at 08:10

## 2021-01-01 RX ADMIN — SERTRALINE HYDROCHLORIDE 100 MG: 100 TABLET ORAL at 08:11

## 2021-01-01 RX ADMIN — GUAIFENESIN 600 MG: 600 TABLET, EXTENDED RELEASE ORAL at 08:22

## 2021-01-01 RX ADMIN — DEXAMETHASONE 6 MG: 4 TABLET ORAL at 09:53

## 2021-01-01 RX ADMIN — POTASSIUM CHLORIDE 10 MEQ: 7.46 INJECTION, SOLUTION INTRAVENOUS at 19:42

## 2021-01-01 RX ADMIN — BARICITINIB 4 MG: 2 TABLET, FILM COATED ORAL at 08:11

## 2021-01-01 RX ADMIN — DEXAMETHASONE 6 MG: 4 TABLET ORAL at 08:10

## 2021-01-01 RX ADMIN — MIDODRINE HYDROCHLORIDE 10 MG: 5 TABLET ORAL at 10:34

## 2021-01-01 RX ADMIN — PANTOPRAZOLE SODIUM 40 MG: 40 TABLET, DELAYED RELEASE ORAL at 05:15

## 2021-01-01 RX ADMIN — SODIUM CHLORIDE 500 ML: 9 INJECTION, SOLUTION INTRAVENOUS at 19:36

## 2021-01-01 RX ADMIN — TAMSULOSIN HYDROCHLORIDE 0.4 MG: 0.4 CAPSULE ORAL at 09:28

## 2021-01-01 RX ADMIN — POTASSIUM BICARBONATE 30 MEQ: 782 TABLET, EFFERVESCENT ORAL at 18:15

## 2021-01-01 RX ADMIN — DIGOXIN 125 MCG: 125 TABLET ORAL at 11:22

## 2021-01-01 RX ADMIN — TAMSULOSIN HYDROCHLORIDE 0.4 MG: 0.4 CAPSULE ORAL at 08:10

## 2021-01-01 RX ADMIN — POTASSIUM CHLORIDE 10 MEQ: 7.46 INJECTION, SOLUTION INTRAVENOUS at 02:59

## 2021-01-01 RX ADMIN — POTASSIUM CHLORIDE 20 MEQ: 1500 TABLET, EXTENDED RELEASE ORAL at 22:22

## 2021-01-01 RX ADMIN — DEXTROSE MONOHYDRATE 12.5 G: 500 INJECTION PARENTERAL at 06:22

## 2021-01-01 RX ADMIN — PANTOPRAZOLE SODIUM 40 MG: 40 TABLET, DELAYED RELEASE ORAL at 06:03

## 2021-01-01 RX ADMIN — DILTIAZEM HYDROCHLORIDE 5 MG: 5 INJECTION INTRAVENOUS at 09:36

## 2021-01-01 RX ADMIN — POTASSIUM CHLORIDE 10 MEQ: 7.46 INJECTION, SOLUTION INTRAVENOUS at 10:50

## 2021-01-01 RX ADMIN — APIXABAN 5 MG: 5 TABLET, FILM COATED ORAL at 08:22

## 2021-01-01 RX ADMIN — METOPROLOL TARTRATE 25 MG: 25 TABLET, FILM COATED ORAL at 20:20

## 2021-01-01 RX ADMIN — AZITHROMYCIN DIHYDRATE 500 MG: 500 INJECTION, POWDER, LYOPHILIZED, FOR SOLUTION INTRAVENOUS at 13:20

## 2021-01-01 RX ADMIN — POTASSIUM CHLORIDE 10 MEQ: 7.46 INJECTION, SOLUTION INTRAVENOUS at 19:50

## 2021-01-01 RX ADMIN — AZITHROMYCIN DIHYDRATE 500 MG: 500 INJECTION, POWDER, LYOPHILIZED, FOR SOLUTION INTRAVENOUS at 12:43

## 2021-01-01 RX ADMIN — INSULIN GLARGINE 15 UNITS: 100 INJECTION, SOLUTION SUBCUTANEOUS at 20:17

## 2021-01-01 RX ADMIN — DEXAMETHASONE 6 MG: 4 TABLET ORAL at 08:12

## 2021-01-01 RX ADMIN — POTASSIUM CHLORIDE 10 MEQ: 7.46 INJECTION, SOLUTION INTRAVENOUS at 02:07

## 2021-01-01 RX ADMIN — APIXABAN 5 MG: 5 TABLET, FILM COATED ORAL at 20:20

## 2021-01-01 RX ADMIN — MAGNESIUM OXIDE 400 MG (241.3 MG MAGNESIUM) TABLET 400 MG: TABLET at 09:54

## 2021-01-01 RX ADMIN — DEXAMETHASONE 6 MG: 4 TABLET ORAL at 11:27

## 2021-01-01 RX ADMIN — DEXAMETHASONE 6 MG: 4 TABLET ORAL at 08:23

## 2021-01-01 RX ADMIN — APIXABAN 5 MG: 5 TABLET, FILM COATED ORAL at 20:15

## 2021-01-01 RX ADMIN — APIXABAN 5 MG: 5 TABLET, FILM COATED ORAL at 21:11

## 2021-01-01 RX ADMIN — DEXAMETHASONE 6 MG: 4 TABLET ORAL at 07:48

## 2021-01-01 RX ADMIN — TAMSULOSIN HYDROCHLORIDE 0.4 MG: 0.4 CAPSULE ORAL at 08:11

## 2021-01-01 RX ADMIN — POTASSIUM CHLORIDE 20 MEQ: 1500 TABLET, EXTENDED RELEASE ORAL at 21:55

## 2021-01-01 RX ADMIN — INSULIN LISPRO 5 UNITS: 100 INJECTION, SOLUTION INTRAVENOUS; SUBCUTANEOUS at 12:38

## 2021-01-01 RX ADMIN — PRAVASTATIN SODIUM 40 MG: 40 TABLET ORAL at 17:35

## 2021-01-01 RX ADMIN — SPIRONOLACTONE 25 MG: 25 TABLET ORAL at 09:26

## 2021-01-01 RX ADMIN — METOPROLOL TARTRATE 25 MG: 25 TABLET, FILM COATED ORAL at 20:15

## 2021-01-01 RX ADMIN — APIXABAN 5 MG: 5 TABLET, FILM COATED ORAL at 09:54

## 2021-01-01 RX ADMIN — GUAIFENESIN 600 MG: 600 TABLET, EXTENDED RELEASE ORAL at 08:02

## 2021-01-01 RX ADMIN — AMIODARONE HYDROCHLORIDE 0.5 MG/MIN: 50 INJECTION, SOLUTION INTRAVENOUS at 10:16

## 2021-01-01 RX ADMIN — SERTRALINE HYDROCHLORIDE 100 MG: 100 TABLET ORAL at 09:18

## 2021-01-01 RX ADMIN — METOPROLOL TARTRATE 25 MG: 25 TABLET, FILM COATED ORAL at 07:49

## 2021-01-01 RX ADMIN — GUAIFENESIN 600 MG: 600 TABLET, EXTENDED RELEASE ORAL at 14:25

## 2021-01-01 RX ADMIN — DILTIAZEM HYDROCHLORIDE 30 MG: 60 TABLET, FILM COATED ORAL at 10:34

## 2021-01-01 RX ADMIN — INSULIN GLARGINE 30 UNITS: 100 INJECTION, SOLUTION SUBCUTANEOUS at 20:23

## 2021-01-01 RX ADMIN — INSULIN GLARGINE 30 UNITS: 100 INJECTION, SOLUTION SUBCUTANEOUS at 22:11

## 2021-01-01 RX ADMIN — MAGNESIUM SULFATE 2000 MG: 2 INJECTION INTRAVENOUS at 19:49

## 2021-01-01 RX ADMIN — POTASSIUM CHLORIDE 10 MEQ: 7.46 INJECTION, SOLUTION INTRAVENOUS at 07:47

## 2021-01-01 RX ADMIN — PANTOPRAZOLE SODIUM 40 MG: 40 TABLET, DELAYED RELEASE ORAL at 08:23

## 2021-01-01 RX ADMIN — POTASSIUM CHLORIDE 10 MEQ: 7.46 INJECTION, SOLUTION INTRAVENOUS at 01:12

## 2021-01-01 RX ADMIN — DILTIAZEM HYDROCHLORIDE 90 MG: 60 TABLET, FILM COATED ORAL at 15:32

## 2021-01-01 RX ADMIN — GUAIFENESIN 600 MG: 600 TABLET, EXTENDED RELEASE ORAL at 20:22

## 2021-01-01 RX ADMIN — MIDODRINE HYDROCHLORIDE 10 MG: 5 TABLET ORAL at 08:02

## 2021-01-01 RX ADMIN — INSULIN GLARGINE 15 UNITS: 100 INJECTION, SOLUTION SUBCUTANEOUS at 05:41

## 2021-01-01 RX ADMIN — AMIODARONE HYDROCHLORIDE 200 MG: 200 TABLET ORAL at 14:26

## 2021-01-01 RX ADMIN — POTASSIUM CHLORIDE 10 MEQ: 7.46 INJECTION, SOLUTION INTRAVENOUS at 11:47

## 2021-01-01 RX ADMIN — TAMSULOSIN HYDROCHLORIDE 0.4 MG: 0.4 CAPSULE ORAL at 08:15

## 2021-01-01 RX ADMIN — POTASSIUM CHLORIDE 10 MEQ: 7.46 INJECTION, SOLUTION INTRAVENOUS at 04:05

## 2021-01-01 RX ADMIN — SODIUM CHLORIDE, PRESERVATIVE FREE 10 ML: 5 INJECTION INTRAVENOUS at 20:22

## 2021-01-01 RX ADMIN — BARICITINIB 4 MG: 2 TABLET, FILM COATED ORAL at 08:16

## 2021-01-01 RX ADMIN — ALBUMIN (HUMAN) 25 G: 12.5 INJECTION, SOLUTION INTRAVENOUS at 13:58

## 2021-01-01 RX ADMIN — DEXTROSE MONOHYDRATE 150 MG: 50 INJECTION, SOLUTION INTRAVENOUS at 22:16

## 2021-01-01 RX ADMIN — BARICITINIB 4 MG: 2 TABLET, FILM COATED ORAL at 08:01

## 2021-01-01 RX ADMIN — POLYETHYLENE GLYCOL (3350) 17 G: 17 POWDER, FOR SOLUTION ORAL at 11:28

## 2021-01-01 RX ADMIN — DEXAMETHASONE 6 MG: 4 TABLET ORAL at 09:28

## 2021-01-01 RX ADMIN — POTASSIUM CHLORIDE 10 MEQ: 7.46 INJECTION, SOLUTION INTRAVENOUS at 14:26

## 2021-01-01 RX ADMIN — SODIUM CHLORIDE 25 ML: 9 INJECTION, SOLUTION INTRAVENOUS at 09:27

## 2021-01-01 RX ADMIN — CEFTRIAXONE 1000 MG: 1 INJECTION, POWDER, FOR SOLUTION INTRAMUSCULAR; INTRAVENOUS at 19:40

## 2021-01-01 RX ADMIN — GUAIFENESIN 600 MG: 600 TABLET, EXTENDED RELEASE ORAL at 20:34

## 2021-01-01 RX ADMIN — METOPROLOL TARTRATE 25 MG: 25 TABLET, FILM COATED ORAL at 21:00

## 2021-01-01 RX ADMIN — POTASSIUM CHLORIDE 10 MEQ: 7.46 INJECTION, SOLUTION INTRAVENOUS at 13:52

## 2021-01-01 RX ADMIN — INSULIN LISPRO 5 UNITS: 100 INJECTION, SOLUTION INTRAVENOUS; SUBCUTANEOUS at 12:13

## 2021-01-01 RX ADMIN — SODIUM CHLORIDE 500 ML: 9 INJECTION, SOLUTION INTRAVENOUS at 18:11

## 2021-01-01 RX ADMIN — METOPROLOL TARTRATE 25 MG: 25 TABLET, FILM COATED ORAL at 09:28

## 2021-01-01 RX ADMIN — DEXTROSE MONOHYDRATE 12.5 G: 500 INJECTION PARENTERAL at 06:18

## 2021-01-01 RX ADMIN — SODIUM CHLORIDE, PRESERVATIVE FREE 10 ML: 5 INJECTION INTRAVENOUS at 08:17

## 2021-01-01 RX ADMIN — METOPROLOL TARTRATE 25 MG: 25 TABLET, FILM COATED ORAL at 11:28

## 2021-01-01 RX ADMIN — BARICITINIB 4 MG: 2 TABLET, FILM COATED ORAL at 08:00

## 2021-01-01 RX ADMIN — POTASSIUM CHLORIDE AND SODIUM CHLORIDE: 450; 150 INJECTION, SOLUTION INTRAVENOUS at 09:00

## 2021-01-01 RX ADMIN — PRAVASTATIN SODIUM 40 MG: 40 TABLET ORAL at 18:39

## 2021-01-01 RX ADMIN — AZITHROMYCIN DIHYDRATE 500 MG: 500 INJECTION, POWDER, LYOPHILIZED, FOR SOLUTION INTRAVENOUS at 13:50

## 2021-01-01 RX ADMIN — APIXABAN 5 MG: 5 TABLET, FILM COATED ORAL at 21:55

## 2021-01-01 RX ADMIN — DILTIAZEM HYDROCHLORIDE 90 MG: 60 TABLET, FILM COATED ORAL at 08:00

## 2021-01-01 RX ADMIN — ACETAMINOPHEN 650 MG: 325 TABLET ORAL at 22:23

## 2021-01-01 RX ADMIN — INSULIN GLARGINE 35 UNITS: 100 INJECTION, SOLUTION SUBCUTANEOUS at 21:01

## 2021-01-01 RX ADMIN — AZITHROMYCIN DIHYDRATE 500 MG: 500 INJECTION, POWDER, LYOPHILIZED, FOR SOLUTION INTRAVENOUS at 11:18

## 2021-01-01 RX ADMIN — DILTIAZEM HYDROCHLORIDE 60 MG: 60 TABLET, FILM COATED ORAL at 09:18

## 2021-01-01 RX ADMIN — AMIODARONE HYDROCHLORIDE 200 MG: 200 TABLET ORAL at 08:07

## 2021-01-01 RX ADMIN — PRAVASTATIN SODIUM 40 MG: 40 TABLET ORAL at 17:08

## 2021-01-01 RX ADMIN — POTASSIUM CHLORIDE 10 MEQ: 7.46 INJECTION, SOLUTION INTRAVENOUS at 22:31

## 2021-01-01 RX ADMIN — POTASSIUM CHLORIDE 10 MEQ: 7.46 INJECTION, SOLUTION INTRAVENOUS at 00:09

## 2021-01-01 RX ADMIN — APIXABAN 5 MG: 5 TABLET, FILM COATED ORAL at 11:45

## 2021-01-01 RX ADMIN — POTASSIUM CHLORIDE 10 MEQ: 7.46 INJECTION, SOLUTION INTRAVENOUS at 17:12

## 2021-01-01 RX ADMIN — PANTOPRAZOLE SODIUM 40 MG: 40 TABLET, DELAYED RELEASE ORAL at 05:17

## 2021-01-01 RX ADMIN — POTASSIUM CHLORIDE 10 MEQ: 7.46 INJECTION, SOLUTION INTRAVENOUS at 13:07

## 2021-01-01 RX ADMIN — METOPROLOL TARTRATE 25 MG: 25 TABLET, FILM COATED ORAL at 09:54

## 2021-01-01 RX ADMIN — SERTRALINE HYDROCHLORIDE 100 MG: 100 TABLET ORAL at 08:00

## 2021-01-01 RX ADMIN — LISINOPRIL 2.5 MG: 2.5 TABLET ORAL at 08:17

## 2021-01-01 RX ADMIN — CEFTRIAXONE 1000 MG: 1 INJECTION, POWDER, FOR SOLUTION INTRAMUSCULAR; INTRAVENOUS at 12:21

## 2021-01-01 RX ADMIN — SERTRALINE HYDROCHLORIDE 100 MG: 100 TABLET ORAL at 08:23

## 2021-01-01 RX ADMIN — ACETAMINOPHEN 650 MG: 325 TABLET ORAL at 08:00

## 2021-01-01 RX ADMIN — BARICITINIB 4 MG: 2 TABLET, FILM COATED ORAL at 09:18

## 2021-01-01 RX ADMIN — ENOXAPARIN SODIUM 30 MG: 100 INJECTION SUBCUTANEOUS at 01:21

## 2021-01-01 RX ADMIN — MAGNESIUM SULFATE HEPTAHYDRATE 1000 MG: 1 INJECTION, SOLUTION INTRAVENOUS at 21:27

## 2021-01-01 RX ADMIN — DILTIAZEM HYDROCHLORIDE 5 MG/HR: 5 INJECTION INTRAVENOUS at 06:08

## 2021-01-01 RX ADMIN — DEXTROSE MONOHYDRATE 12.5 G: 500 INJECTION PARENTERAL at 17:07

## 2021-01-01 RX ADMIN — AMIODARONE HYDROCHLORIDE 200 MG: 200 TABLET ORAL at 09:54

## 2021-01-01 RX ADMIN — GUAIFENESIN 600 MG: 600 TABLET, EXTENDED RELEASE ORAL at 07:48

## 2021-01-01 RX ADMIN — SODIUM CHLORIDE, PRESERVATIVE FREE 10 ML: 5 INJECTION INTRAVENOUS at 21:14

## 2021-01-01 RX ADMIN — PANTOPRAZOLE SODIUM 40 MG: 40 TABLET, DELAYED RELEASE ORAL at 08:18

## 2021-01-01 RX ADMIN — POTASSIUM CHLORIDE 10 MEQ: 7.46 INJECTION, SOLUTION INTRAVENOUS at 15:27

## 2021-01-01 RX ADMIN — CEFTRIAXONE 1000 MG: 1 INJECTION, POWDER, FOR SOLUTION INTRAMUSCULAR; INTRAVENOUS at 11:41

## 2021-01-01 RX ADMIN — AMIODARONE HYDROCHLORIDE 1 MG/MIN: 50 INJECTION, SOLUTION INTRAVENOUS at 22:37

## 2021-01-01 RX ADMIN — GUAIFENESIN 600 MG: 600 TABLET, EXTENDED RELEASE ORAL at 21:11

## 2021-01-01 RX ADMIN — DEXAMETHASONE 6 MG: 4 TABLET ORAL at 08:14

## 2021-01-01 RX ADMIN — APIXABAN 5 MG: 5 TABLET, FILM COATED ORAL at 07:48

## 2021-01-01 RX ADMIN — POTASSIUM CHLORIDE 20 MEQ: 1500 TABLET, EXTENDED RELEASE ORAL at 08:00

## 2021-01-01 RX ADMIN — CEFTRIAXONE 1000 MG: 1 INJECTION, POWDER, FOR SOLUTION INTRAMUSCULAR; INTRAVENOUS at 10:15

## 2021-01-01 RX ADMIN — APIXABAN 5 MG: 5 TABLET, FILM COATED ORAL at 11:28

## 2021-01-01 RX ADMIN — METOPROLOL TARTRATE 25 MG: 25 TABLET, FILM COATED ORAL at 19:02

## 2021-01-01 RX ADMIN — TAMSULOSIN HYDROCHLORIDE 0.4 MG: 0.4 CAPSULE ORAL at 14:20

## 2021-01-01 RX ADMIN — GUAIFENESIN 600 MG: 600 TABLET, EXTENDED RELEASE ORAL at 11:28

## 2021-01-01 RX ADMIN — SODIUM CHLORIDE, PRESERVATIVE FREE 10 ML: 5 INJECTION INTRAVENOUS at 14:20

## 2021-01-01 RX ADMIN — POTASSIUM CHLORIDE 10 MEQ: 7.46 INJECTION, SOLUTION INTRAVENOUS at 13:30

## 2021-01-01 RX ADMIN — POTASSIUM BICARBONATE 40 MEQ: 782 TABLET, EFFERVESCENT ORAL at 21:53

## 2021-01-01 RX ADMIN — AMIODARONE HYDROCHLORIDE 200 MG: 200 TABLET ORAL at 07:48

## 2021-01-01 RX ADMIN — BUMETANIDE 2 MG: 0.25 INJECTION, SOLUTION INTRAMUSCULAR; INTRAVENOUS at 14:19

## 2021-01-01 RX ADMIN — METOPROLOL TARTRATE 25 MG: 25 TABLET, FILM COATED ORAL at 18:34

## 2021-01-01 RX ADMIN — POTASSIUM CHLORIDE AND SODIUM CHLORIDE: 450; 150 INJECTION, SOLUTION INTRAVENOUS at 02:07

## 2021-01-01 RX ADMIN — DILTIAZEM HYDROCHLORIDE 60 MG: 60 TABLET, FILM COATED ORAL at 16:10

## 2021-01-01 RX ADMIN — AMIODARONE HYDROCHLORIDE 200 MG: 200 TABLET ORAL at 08:00

## 2021-01-01 RX ADMIN — APIXABAN 5 MG: 5 TABLET, FILM COATED ORAL at 20:21

## 2021-01-01 RX ADMIN — AMIODARONE HYDROCHLORIDE 200 MG: 200 TABLET ORAL at 08:23

## 2021-01-01 RX ADMIN — GUAIFENESIN 600 MG: 600 TABLET, EXTENDED RELEASE ORAL at 20:09

## 2021-01-01 RX ADMIN — PRAVASTATIN SODIUM 40 MG: 40 TABLET ORAL at 18:06

## 2021-01-01 RX ADMIN — SODIUM CHLORIDE, PRESERVATIVE FREE 10 ML: 5 INJECTION INTRAVENOUS at 09:28

## 2021-01-01 RX ADMIN — APIXABAN 5 MG: 5 TABLET, FILM COATED ORAL at 21:00

## 2021-01-01 RX ADMIN — METOPROLOL TARTRATE 25 MG: 25 TABLET, FILM COATED ORAL at 14:03

## 2021-01-01 RX ADMIN — SODIUM CHLORIDE, PRESERVATIVE FREE 30 ML: 5 INJECTION INTRAVENOUS at 21:55

## 2021-01-01 RX ADMIN — PRAVASTATIN SODIUM 40 MG: 40 TABLET ORAL at 17:07

## 2021-01-01 RX ADMIN — SODIUM CHLORIDE, PRESERVATIVE FREE 10 ML: 5 INJECTION INTRAVENOUS at 20:16

## 2021-01-01 RX ADMIN — SODIUM CHLORIDE, PRESERVATIVE FREE 10 ML: 5 INJECTION INTRAVENOUS at 22:35

## 2021-01-01 RX ADMIN — INSULIN GLARGINE 30 UNITS: 100 INJECTION, SOLUTION SUBCUTANEOUS at 22:24

## 2021-01-01 RX ADMIN — INSULIN GLARGINE 15 UNITS: 100 INJECTION, SOLUTION SUBCUTANEOUS at 01:18

## 2021-01-01 RX ADMIN — PRAVASTATIN SODIUM 40 MG: 40 TABLET ORAL at 18:37

## 2021-01-01 RX ADMIN — DILTIAZEM HYDROCHLORIDE 90 MG: 60 TABLET, FILM COATED ORAL at 07:59

## 2021-01-01 RX ADMIN — AMIODARONE HYDROCHLORIDE 200 MG: 200 TABLET ORAL at 08:02

## 2021-01-01 RX ADMIN — GUAIFENESIN 600 MG: 600 TABLET, EXTENDED RELEASE ORAL at 08:11

## 2021-01-01 RX ADMIN — SODIUM CHLORIDE, PRESERVATIVE FREE 10 ML: 5 INJECTION INTRAVENOUS at 20:02

## 2021-01-01 RX ADMIN — POTASSIUM CHLORIDE 20 MEQ: 1500 TABLET, EXTENDED RELEASE ORAL at 22:01

## 2021-01-01 RX ADMIN — BARICITINIB 4 MG: 2 TABLET, FILM COATED ORAL at 09:28

## 2021-01-01 RX ADMIN — MIDODRINE HYDROCHLORIDE 10 MG: 5 TABLET ORAL at 08:00

## 2021-01-01 RX ADMIN — APIXABAN 5 MG: 5 TABLET, FILM COATED ORAL at 22:22

## 2021-01-01 RX ADMIN — DILTIAZEM HYDROCHLORIDE 90 MG: 60 TABLET, FILM COATED ORAL at 22:23

## 2021-01-01 RX ADMIN — AZITHROMYCIN MONOHYDRATE 500 MG: 500 INJECTION, POWDER, LYOPHILIZED, FOR SOLUTION INTRAVENOUS at 22:31

## 2021-01-01 RX ADMIN — SODIUM CHLORIDE, PRESERVATIVE FREE 10 ML: 5 INJECTION INTRAVENOUS at 22:22

## 2021-01-01 RX ADMIN — APIXABAN 5 MG: 5 TABLET, FILM COATED ORAL at 20:34

## 2021-01-01 RX ADMIN — DEXTROSE MONOHYDRATE: 100 INJECTION, SOLUTION INTRAVENOUS at 17:18

## 2021-01-01 RX ADMIN — DEXTROSE MONOHYDRATE 100 ML/HR: 50 INJECTION, SOLUTION INTRAVENOUS at 02:47

## 2021-01-01 RX ADMIN — PANTOPRAZOLE SODIUM 40 MG: 40 TABLET, DELAYED RELEASE ORAL at 05:29

## 2021-01-01 RX ADMIN — DILTIAZEM HYDROCHLORIDE 90 MG: 60 TABLET, FILM COATED ORAL at 15:47

## 2021-01-01 RX ADMIN — SODIUM CHLORIDE, PRESERVATIVE FREE 10 ML: 5 INJECTION INTRAVENOUS at 07:50

## 2021-01-01 RX ADMIN — SODIUM CHLORIDE, PRESERVATIVE FREE 10 ML: 5 INJECTION INTRAVENOUS at 21:00

## 2021-01-01 RX ADMIN — GUAIFENESIN 600 MG: 600 TABLET, EXTENDED RELEASE ORAL at 22:23

## 2021-01-01 RX ADMIN — MIDODRINE HYDROCHLORIDE 10 MG: 5 TABLET ORAL at 11:33

## 2021-01-01 RX ADMIN — SODIUM PHOSPHATE, MONOBASIC, MONOHYDRATE 20 MMOL: 276; 142 INJECTION, SOLUTION INTRAVENOUS at 11:21

## 2021-01-01 RX ADMIN — SODIUM CHLORIDE, PRESERVATIVE FREE 10 ML: 5 INJECTION INTRAVENOUS at 09:18

## 2021-01-01 RX ADMIN — POTASSIUM CHLORIDE 10 MEQ: 7.46 INJECTION, SOLUTION INTRAVENOUS at 15:43

## 2021-01-01 RX ADMIN — BARICITINIB 4 MG: 2 TABLET, FILM COATED ORAL at 11:27

## 2021-01-01 RX ADMIN — DILTIAZEM HYDROCHLORIDE 90 MG: 60 TABLET, FILM COATED ORAL at 12:21

## 2021-01-01 RX ADMIN — DEXTROSE MONOHYDRATE 12.5 G: 500 INJECTION PARENTERAL at 02:43

## 2021-01-01 RX ADMIN — DEXAMETHASONE SODIUM PHOSPHATE 6 MG: 10 INJECTION INTRAMUSCULAR; INTRAVENOUS at 16:50

## 2021-01-01 RX ADMIN — SODIUM CHLORIDE, PRESERVATIVE FREE 10 ML: 5 INJECTION INTRAVENOUS at 20:20

## 2021-01-01 RX ADMIN — POTASSIUM CHLORIDE 10 MEQ: 7.46 INJECTION, SOLUTION INTRAVENOUS at 21:50

## 2021-01-01 RX ADMIN — AMIODARONE HYDROCHLORIDE 200 MG: 200 TABLET ORAL at 09:28

## 2021-01-01 RX ADMIN — DEXTROSE MONOHYDRATE 12.5 G: 500 INJECTION PARENTERAL at 05:52

## 2021-01-01 RX ADMIN — APIXABAN 5 MG: 5 TABLET, FILM COATED ORAL at 08:07

## 2021-01-01 RX ADMIN — METOPROLOL TARTRATE 25 MG: 25 TABLET, FILM COATED ORAL at 23:10

## 2021-01-01 RX ADMIN — MIDODRINE HYDROCHLORIDE 10 MG: 5 TABLET ORAL at 12:21

## 2021-01-01 RX ADMIN — Medication 2 MCG/MIN: at 19:14

## 2021-01-01 RX ADMIN — SODIUM CHLORIDE, PRESERVATIVE FREE 10 ML: 5 INJECTION INTRAVENOUS at 11:29

## 2021-01-01 RX ADMIN — DEXTROSE MONOHYDRATE: 100 INJECTION, SOLUTION INTRAVENOUS at 17:43

## 2021-01-01 RX ADMIN — APIXABAN 5 MG: 5 TABLET, FILM COATED ORAL at 09:28

## 2021-01-01 RX ADMIN — METOPROLOL TARTRATE 25 MG: 25 TABLET, FILM COATED ORAL at 21:11

## 2021-01-01 RX ADMIN — MAGNESIUM OXIDE 400 MG (241.3 MG MAGNESIUM) TABLET 400 MG: TABLET at 08:01

## 2021-01-01 RX ADMIN — METOPROLOL TARTRATE 25 MG: 25 TABLET, FILM COATED ORAL at 11:44

## 2021-01-01 RX ADMIN — INSULIN GLARGINE 20 UNITS: 100 INJECTION, SOLUTION SUBCUTANEOUS at 21:36

## 2021-01-01 RX ADMIN — POTASSIUM CHLORIDE 10 MEQ: 7.46 INJECTION, SOLUTION INTRAVENOUS at 16:03

## 2021-01-01 RX ADMIN — MIDODRINE HYDROCHLORIDE 10 MG: 5 TABLET ORAL at 15:47

## 2021-01-01 RX ADMIN — PRAVASTATIN SODIUM 40 MG: 40 TABLET ORAL at 19:02

## 2021-01-01 RX ADMIN — SODIUM CHLORIDE, PRESERVATIVE FREE 10 ML: 5 INJECTION INTRAVENOUS at 14:42

## 2021-01-01 RX ADMIN — GUAIFENESIN 600 MG: 600 TABLET, EXTENDED RELEASE ORAL at 21:00

## 2021-01-01 RX ADMIN — TAMSULOSIN HYDROCHLORIDE 0.4 MG: 0.4 CAPSULE ORAL at 09:57

## 2021-01-01 RX ADMIN — POTASSIUM CHLORIDE 10 MEQ: 7.46 INJECTION, SOLUTION INTRAVENOUS at 22:55

## 2021-01-01 RX ADMIN — SERTRALINE HYDROCHLORIDE 100 MG: 100 TABLET ORAL at 09:28

## 2021-01-01 RX ADMIN — DILTIAZEM HYDROCHLORIDE 5 MG/HR: 5 INJECTION INTRAVENOUS at 06:10

## 2021-01-01 RX ADMIN — TAMSULOSIN HYDROCHLORIDE 0.4 MG: 0.4 CAPSULE ORAL at 11:28

## 2021-01-01 RX ADMIN — GUAIFENESIN 600 MG: 600 TABLET, EXTENDED RELEASE ORAL at 05:28

## 2021-01-01 RX ADMIN — POTASSIUM CHLORIDE 10 MEQ: 7.46 INJECTION, SOLUTION INTRAVENOUS at 20:06

## 2021-01-01 RX ADMIN — SODIUM CHLORIDE, PRESERVATIVE FREE 30 ML: 5 INJECTION INTRAVENOUS at 21:14

## 2021-01-01 RX ADMIN — DILTIAZEM HYDROCHLORIDE 60 MG: 60 TABLET, FILM COATED ORAL at 11:21

## 2021-01-01 ASSESSMENT — PAIN SCALES - WONG BAKER
WONGBAKER_NUMERICALRESPONSE: 0
WONGBAKER_NUMERICALRESPONSE: 0;6
WONGBAKER_NUMERICALRESPONSE: 0
WONGBAKER_NUMERICALRESPONSE: 6
WONGBAKER_NUMERICALRESPONSE: 4
WONGBAKER_NUMERICALRESPONSE: 0

## 2021-01-01 ASSESSMENT — PAIN SCALES - PAIN ASSESSMENT IN ADVANCED DEMENTIA (PAINAD)
BREATHING: 0
CONSOLABILITY: 0
NEGVOCALIZATION: 0
BREATHING: 0
BODYLANGUAGE: 0
FACIALEXPRESSION: 0
NEGVOCALIZATION: 0
CONSOLABILITY: 0
CONSOLABILITY: 0
TOTALSCORE: 0
BREATHING: 0
FACIALEXPRESSION: 0
TOTALSCORE: 0
BREATHING: 0
BODYLANGUAGE: 0
FACIALEXPRESSION: 0
NEGVOCALIZATION: 0
NEGVOCALIZATION: 0
BODYLANGUAGE: 0
FACIALEXPRESSION: 0
TOTALSCORE: 0
CONSOLABILITY: 0
TOTALSCORE: 0
FACIALEXPRESSION: 0
TOTALSCORE: 0
CONSOLABILITY: 0
NEGVOCALIZATION: 0
FACIALEXPRESSION: 0
TOTALSCORE: 0
BREATHING: 0
BREATHING: 0
TOTALSCORE: 0
NEGVOCALIZATION: 0
BREATHING: 0
NEGVOCALIZATION: 0
BODYLANGUAGE: 0
BREATHING: 0
NEGVOCALIZATION: 0
BODYLANGUAGE: 0
FACIALEXPRESSION: 0
CONSOLABILITY: 0
CONSOLABILITY: 0
TOTALSCORE: 0
TOTALSCORE: 0
NEGVOCALIZATION: 0
CONSOLABILITY: 0
BREATHING: 0
BREATHING: 0
CONSOLABILITY: 0
FACIALEXPRESSION: 0
BODYLANGUAGE: 0
BREATHING: 0
BREATHING: 0
NEGVOCALIZATION: 0
FACIALEXPRESSION: 0
BODYLANGUAGE: 0
FACIALEXPRESSION: 0
BODYLANGUAGE: 0
TOTALSCORE: 0
TOTALSCORE: 0
FACIALEXPRESSION: 0
FACIALEXPRESSION: 0
NEGVOCALIZATION: 0
TOTALSCORE: 0
FACIALEXPRESSION: 0
CONSOLABILITY: 0
BODYLANGUAGE: 0
NEGVOCALIZATION: 0
BODYLANGUAGE: 0
NEGVOCALIZATION: 0
TOTALSCORE: 0
BREATHING: 0
NEGVOCALIZATION: 0
TOTALSCORE: 0
BREATHING: 0
BODYLANGUAGE: 0
NEGVOCALIZATION: 0
TOTALSCORE: 0
TOTALSCORE: 0
CONSOLABILITY: 0
BODYLANGUAGE: 0
TOTALSCORE: 0
FACIALEXPRESSION: 0
TOTALSCORE: 0
CONSOLABILITY: 0
NEGVOCALIZATION: 0
BREATHING: 0
TOTALSCORE: 0
FACIALEXPRESSION: 0
BREATHING: 0
TOTALSCORE: 0
CONSOLABILITY: 0
CONSOLABILITY: 0
NEGVOCALIZATION: 0
BREATHING: 0
NEGVOCALIZATION: 0
BODYLANGUAGE: 0
NEGVOCALIZATION: 0
FACIALEXPRESSION: 0
FACIALEXPRESSION: 0
CONSOLABILITY: 0
BODYLANGUAGE: 0
TOTALSCORE: 0
TOTALSCORE: 0
BODYLANGUAGE: 0
BODYLANGUAGE: 0
CONSOLABILITY: 0
BREATHING: 0
TOTALSCORE: 0
FACIALEXPRESSION: 0
CONSOLABILITY: 0
TOTALSCORE: 0
BREATHING: 0
NEGVOCALIZATION: 0
BREATHING: 0
FACIALEXPRESSION: 0
FACIALEXPRESSION: 0
BODYLANGUAGE: 0
BREATHING: 0
BODYLANGUAGE: 0
CONSOLABILITY: 0
CONSOLABILITY: 0
FACIALEXPRESSION: 0
BREATHING: 0
CONSOLABILITY: 0
BODYLANGUAGE: 0
BREATHING: 0
NEGVOCALIZATION: 0
FACIALEXPRESSION: 0
TOTALSCORE: 0
BODYLANGUAGE: 0
CONSOLABILITY: 0
TOTALSCORE: 0
BREATHING: 0
NEGVOCALIZATION: 0
NEGVOCALIZATION: 0
BREATHING: 0
BREATHING: 0
NEGVOCALIZATION: 0
FACIALEXPRESSION: 0
NEGVOCALIZATION: 0
BODYLANGUAGE: 0
TOTALSCORE: 0
BODYLANGUAGE: 0
BODYLANGUAGE: 0
BREATHING: 0
BODYLANGUAGE: 0
FACIALEXPRESSION: 0
CONSOLABILITY: 0
BODYLANGUAGE: 0
CONSOLABILITY: 0
TOTALSCORE: 0
NEGVOCALIZATION: 0
TOTALSCORE: 0
TOTALSCORE: 0
CONSOLABILITY: 0
TOTALSCORE: 0
BREATHING: 0
BODYLANGUAGE: 0
NEGVOCALIZATION: 0
CONSOLABILITY: 0
CONSOLABILITY: 0
BODYLANGUAGE: 0
CONSOLABILITY: 0
BODYLANGUAGE: 0
TOTALSCORE: 0
TOTALSCORE: 0
FACIALEXPRESSION: 0
FACIALEXPRESSION: 0
CONSOLABILITY: 0
TOTALSCORE: 0
FACIALEXPRESSION: 0
NEGVOCALIZATION: 0
BREATHING: 0
TOTALSCORE: 0
BODYLANGUAGE: 0
TOTALSCORE: 0
BODYLANGUAGE: 0
BODYLANGUAGE: 0
BREATHING: 0
BODYLANGUAGE: 0
FACIALEXPRESSION: 0
CONSOLABILITY: 0
FACIALEXPRESSION: 0
BODYLANGUAGE: 0
FACIALEXPRESSION: 0
TOTALSCORE: 0
TOTALSCORE: 0
CONSOLABILITY: 0
FACIALEXPRESSION: 0
BODYLANGUAGE: 0
FACIALEXPRESSION: 0
FACIALEXPRESSION: 0
BODYLANGUAGE: 0
FACIALEXPRESSION: 0
BREATHING: 0
BREATHING: 0
FACIALEXPRESSION: 0
BODYLANGUAGE: 0
BREATHING: 0
CONSOLABILITY: 0
BODYLANGUAGE: 0
BREATHING: 0
NEGVOCALIZATION: 0
CONSOLABILITY: 0
NEGVOCALIZATION: 0
FACIALEXPRESSION: 0
FACIALEXPRESSION: 0
BREATHING: 0
TOTALSCORE: 0
BREATHING: 0
FACIALEXPRESSION: 0
CONSOLABILITY: 0
BODYLANGUAGE: 0
BODYLANGUAGE: 0
BREATHING: 0
FACIALEXPRESSION: 0
TOTALSCORE: 0
BREATHING: 0
TOTALSCORE: 0
NEGVOCALIZATION: 0
CONSOLABILITY: 0
NEGVOCALIZATION: 0
BODYLANGUAGE: 0
FACIALEXPRESSION: 0
BREATHING: 0
TOTALSCORE: 0
NEGVOCALIZATION: 0
FACIALEXPRESSION: 0
NEGVOCALIZATION: 0
NEGVOCALIZATION: 0
BREATHING: 0
CONSOLABILITY: 0
CONSOLABILITY: 0
TOTALSCORE: 0
TOTALSCORE: 0
BODYLANGUAGE: 0
CONSOLABILITY: 0
BREATHING: 0
BREATHING: 0
NEGVOCALIZATION: 0
TOTALSCORE: 0
FACIALEXPRESSION: 0
NEGVOCALIZATION: 0
NEGVOCALIZATION: 0
BODYLANGUAGE: 0
CONSOLABILITY: 0
NEGVOCALIZATION: 0
TOTALSCORE: 0
BODYLANGUAGE: 0
NEGVOCALIZATION: 0
CONSOLABILITY: 0
FACIALEXPRESSION: 0
CONSOLABILITY: 0
TOTALSCORE: 0
BREATHING: 0
NEGVOCALIZATION: 0
TOTALSCORE: 0
CONSOLABILITY: 0
BODYLANGUAGE: 0
BREATHING: 0
FACIALEXPRESSION: 0
NEGVOCALIZATION: 0
FACIALEXPRESSION: 0
NEGVOCALIZATION: 0
BODYLANGUAGE: 0
TOTALSCORE: 0
CONSOLABILITY: 0
CONSOLABILITY: 0
NEGVOCALIZATION: 0
BREATHING: 0
BREATHING: 0
FACIALEXPRESSION: 0
BREATHING: 0
CONSOLABILITY: 0
NEGVOCALIZATION: 0
BREATHING: 0
BODYLANGUAGE: 0
CONSOLABILITY: 0
NEGVOCALIZATION: 0
BODYLANGUAGE: 0
BREATHING: 0
TOTALSCORE: 0
CONSOLABILITY: 0
CONSOLABILITY: 0
BREATHING: 0
BODYLANGUAGE: 0

## 2021-01-01 ASSESSMENT — PAIN DESCRIPTION - PAIN TYPE
TYPE: CHRONIC PAIN
TYPE: CHRONIC PAIN

## 2021-01-01 ASSESSMENT — PAIN SCALES - GENERAL
PAINLEVEL_OUTOF10: 0
PAINLEVEL_OUTOF10: 8
PAINLEVEL_OUTOF10: 0
PAINLEVEL_OUTOF10: 5
PAINLEVEL_OUTOF10: 0
PAINLEVEL_OUTOF10: 5
PAINLEVEL_OUTOF10: 0
PAINLEVEL_OUTOF10: 5
PAINLEVEL_OUTOF10: 2
PAINLEVEL_OUTOF10: 0
PAINLEVEL_OUTOF10: 8
PAINLEVEL_OUTOF10: 5
PAINLEVEL_OUTOF10: 0

## 2021-01-01 ASSESSMENT — PAIN DESCRIPTION - FREQUENCY
FREQUENCY: CONTINUOUS
FREQUENCY: CONTINUOUS

## 2021-01-01 ASSESSMENT — PAIN - FUNCTIONAL ASSESSMENT
PAIN_FUNCTIONAL_ASSESSMENT: PREVENTS OR INTERFERES SOME ACTIVE ACTIVITIES AND ADLS
PAIN_FUNCTIONAL_ASSESSMENT: PREVENTS OR INTERFERES SOME ACTIVE ACTIVITIES AND ADLS
PAIN_FUNCTIONAL_ASSESSMENT: ADVANCED DEMENTIA

## 2021-01-01 ASSESSMENT — PAIN DESCRIPTION - ORIENTATION
ORIENTATION: LEFT
ORIENTATION: LEFT

## 2021-01-01 ASSESSMENT — PAIN DESCRIPTION - LOCATION: LOCATION: ANKLE

## 2021-01-01 ASSESSMENT — PAIN DESCRIPTION - PROGRESSION
CLINICAL_PROGRESSION: NOT CHANGED
CLINICAL_PROGRESSION: NOT CHANGED

## 2021-01-01 ASSESSMENT — PAIN DESCRIPTION - DESCRIPTORS
DESCRIPTORS: ACHING
DESCRIPTORS: SHOOTING

## 2021-01-01 ASSESSMENT — PAIN DESCRIPTION - ONSET
ONSET: ON-GOING
ONSET: ON-GOING

## 2021-05-04 PROBLEM — M86.9 OSTEOMYELITIS OF GREAT TOE OF LEFT FOOT (HCC): Status: ACTIVE | Noted: 2021-01-01

## 2021-05-05 NOTE — PROGRESS NOTES
[facility] Initial Visit      Irene Gore  AGE: 66 y.o. GENDER: male  : 1945  EPISODE DATE:  2021   Referred by: Dr. Carmen Ceron    Subjective:     CHIEF COMPLAINT Nonhealing wound to left great toe, possible osteomyelitis     HISTORY of PRESENT ILLNESS      Irene Gore is a 66 y.o. male who presents to the [address] [place] [address] for an initial visit for evaluation and treatment of Chronic diabetic and osteomyelitis  ulcer(s) of  Lt. foot hallux. The condition is of moderate severity. The ulcer has been present for 6 months. The underlying cause is thought to be diabetes, but not patient has chronic osteomyelitis. The patients care to date has included several rounds of antibiotics, including levaquin, and other treatments via his PCP. The patient has significant underlying medical conditions as below. Patient is a diabetic who takes injectable insulin. He tries to check his BG twice a day.  He is not a smoker, and is not on a blood thinner    Wound Pain Timing/Severity: waxing and waning  Quality of pain: tender  Severity of pain:  3 / 10   Modifying Factors: diabetes and poor glucose control  Associated Signs/Symptoms: erythema, drainage and pain        PAST MEDICAL HISTORY        Diagnosis Date    Anxiety     BPH (benign prostatic hyperplasia)     CAD (coronary artery disease)     Constipation     Cyst     liver and pancreas    Debility     Diabetes mellitus ([address]     Dysphagia     GERD (gastroesophageal reflux disease)     Hyperlipidemia     Hypertension     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 No pertinent family history. SOCIAL HISTORY    Social History     Tobacco Use    Smoking status: Former Smoker     Quit date: 1997     Years since quittin.9    Smokeless tobacco: Never Used   Substance Use Topics    Alcohol use: No    Drug use: No       ALLERGIES    Allergies   Allergen Reactions    Calmoseptine [Menthol-Zinc Oxide]     Lyrica [Pregabalin] Other (See Comments)     Eyes cross    Pradaxa [Dabigatran Etexilate Mesylate] Other (See Comments)     Headache         MEDICATIONS    Current Outpatient Medications on File Prior to Encounter   Medication Sig Dispense Refill    oxyCODONE-acetaminophen (PERCOCET) 5-325 MG per tablet Take 1 tablet by mouth every 4 hours as needed for Pain.  pantoprazole (PROTONIX) 40 MG tablet Take 40 mg by mouth daily      tamsulosin (FLOMAX) 0.4 MG capsule Take 0.4 mg by mouth daily      vitamin B-6 (PYRIDOXINE) 100 MG tablet Take 100 mg by mouth daily      vitamin B-12 (CYANOCOBALAMIN) 1000 MCG tablet Take 1,000 mcg by mouth daily      guaiFENesin (MUCINEX) 600 MG SR tablet Take 1 tablet by mouth 2 times daily      nystatin (MYCOSTATIN) 101348 UNIT/GM powder Apply topically 4 times daily. 1 Bottle 1    potassium chloride (K-DUR) 10 MEQ tablet Take 2 tablets by mouth daily 30 tablet 0    oxyCODONE-acetaminophen (PERCOCET)  MG per tablet Take 1 tablet by mouth every 6 hours as needed for Pain      insulin glargine (LANTUS) 100 UNIT/ML injection vial Inject 30 Units into the skin nightly. 1 vial 3    pravastatin (PRAVACHOL) 40 MG tablet Take 40 mg by mouth daily.  Lactulose SOLN Take 30 mLs by mouth nightly.  polyethylene glycol (MIRALAX) powder Take 17 g by mouth daily.  insulin glargine (LANTUS) 100 UNIT/ML injection vial Inject  into the skin nightly.  Sliding scale -   If accucheck >200 = 30 units  If accucheck 101-200 = 20 units  Below 100 - NO Lantus      insulin aspart (NOVOLOG) 100 UNIT/ML injection vial Inject 18 Units into the skin 3 times daily (before meals). Plus sliding scale, with 18 units base      docusate sodium 100 MG CAPS Take 100 mg by mouth 2 times daily as needed for Constipation. (Patient taking differently: Take 100 mg by mouth daily.)      sertraline (ZOLOFT) 25 MG tablet Take 2 tablets by mouth daily. (Patient taking differently: Take 100 mg by mouth daily )      trazodone (DESYREL) 50 MG tablet Take 1 tablet by mouth nightly as needed for Sleep. (Patient taking differently: Take 75 mg by mouth nightly as needed for Sleep.)       No current facility-administered medications on file prior to encounter.         PROBLEM LIST    Patient Active Problem List   Diagnosis    [facility]bral infarction [facility]    HTN (hypertension)    DM (diabetes mellitus) with complications ([address]    Hypercholesteremia    CAD (coronary artery disease)    Occlusion and stenosis of carotid artery    Stroke, acute, within 8 weeks    Hemiparesis, left (HCC)    Dysphagia    Aphasia    Pneumonia due to organism    Leukocytosis    Cholecystitis    Sepsis ([address]    WD-Osteomyelitis of great toe of left foot ([address]       REVIEW OF SYSTEMS    Constitutional: negative for anorexia, chills, fatigue, fevers, malaise, sweats and weight loss  Respiratory: negative for pneumonia, shortness of breath, sputum, stridor and wheezing  Cardiovascular: negative for near-syncope, orthopnea, palpitations, paroxysmal nocturnal dyspnea, syncope and tachypnea  Integument/breast: positive for skin lesion(s)      Objective:      BP (!) 146/64   Pulse 84   Temp 97.4 °F (36.3 °C) (Temporal)   Resp 18     PHYSICAL EXAM  General Appearance: alert and oriented to person, place and time, well-developed and well-nourished, in no acute distress  Pulmonary/Chest: clear to auscultation bilaterally- no wheezes, rales or rhonchi, normal air movement, no respiratory distress  Cardiovascular: normal rate, normal S1 and S2, no gallops, intact distal pulses and no carotid bruits  Dermatologic exam: Visual inspection of the periwound reveals the skin to be cool , clammy and edematous  Wound exam: see wound description below in procedure note      Assessment:       Blair Moreira  appears to have a non-healing wound of the left great toe. The etiology of the wound is felt to be diabetic and osteolyelitis. There are multiple complicating factors including diabetes and poor glucose control. A comprehensive wound management program would be helpful to heal this wound. Assessments completed include fall risk and nutritional, functional,and psychological status. At this time appropriate care would include: periodic debridement and wound care as below. Problem List Items Addressed This Visit     WD-Osteomyelitis of great toe of left foot ([address]    Relevant Orders    Culture, Wound            Procedure Note    Indications:  Based on my examination of this patient's wound(s) today, sharp excision into necrotic subcutaneous tissue is required to promote healing and evaluate the extent of previous healing. Performed by: TIFFANIE Bustos - CNP    Consent obtained: Yes    Time out taken:  Yes    Pain Control: N/A       Debridement:Excisional Debridement    Using curette the wound(s) was/were sharply debrided down through and including the removal of subcutaneous tissue.         Devitalized Tissue Debrided:  fibrin, biofilm, slough and exudate    Pre Debridement Measurements:  Are located in the Wound Documentation Flow Sheet    All active wounds listed below with today's date are evaluated  Wound(s)    debrided this date include # : 1     Post  Debridement Measurements:  Wound 10/13/17 Left flank fold (Active)   Number of days: 1299       Wound 10/13/17 Left ischium (Active)   Number of days: 1299       Wound 10/13/17 Right buttock (Active)   Number of days: 1299       Wound 05/04/21 Toe (Comment  which one) #1 Great toe (Active)   Wound Image   05/04/21 1605   Dressing Status New dressing applied 05/04/21 1653   Wound Cleansed Cleansed with saline 05/04/21 1605   Wound Length (cm) 0.2 cm 05/04/21 1605   Wound Width (cm) 0.3 cm 05/04/21 1605   Wound Depth (cm) 0.4 cm 05/04/21 1605   Wound Surface Area (cm^2) 0.06 cm^2 05/04/21 1605   Wound Volume (cm^3) 0.02 cm^3 05/04/21 1605   Post-Procedure Length (cm) 0.2 cm 05/04/21 1620   Post-Procedure Width (cm) 0.3 cm 05/04/21 1620   Post-Procedure Depth (cm) 0.4 cm 05/04/21 1620   Post-Procedure Surface Area (cm^2) 0.06 cm^2 05/04/21 1620   Post-Procedure Volume (cm^3) 0.02 cm^3 05/04/21 1620   Distance Tunneling (cm) 0 cm 05/04/21 1605   Tunneling Position ___ O'Clock 0 05/04/21 1605   Undermining Starts ___ O'Clock 0 05/04/21 1605   Undermining Ends___ O'Clock 0 05/04/21 1605   Undermining Maxium Distance (cm) 0 05/04/21 1605   Wound Assessment Slough 05/04/21 1605   Drainage Amount Moderate 05/04/21 1605   Drainage Description Serosanguinous 05/04/21 1605   Odor None 05/04/21 1605   Joann-wound Assessment Maceration 05/04/21 1605   Margins Defined edges 05/04/21 1605   Wound Thickness Description not for Pressure Injury Full thickness 05/04/21 1605   Number of days: 0       Percent of Wound(s) Debrided: 100%    Total  Area  Debrided:  0.06 sq cm     Bleeding:  Minimal    Hemostasis Achieved:  by pressure    Procedural Pain:  0  / 10     Post Procedural Pain:  0 / 10     Response to treatment:  Well tolerated by patient. Was able to debride wound, and depth measurement was added. Palpated hard surface, either a calcification or bone with probe. MRI read from PCP encounter. Osteo could not be confirmed or ruled out. 10 day course of levaquin for now. Arterial status is unknown, but patient has a history of fasciotomy to left leg, and PCP has made mention of weak or absent pulses in left foot. This may need to be considered in the future. Culture drawn.  Will review when resulted. Patient has been cultured previously by PCP, and this was negative. Would like for patient to be seen by a surgeon next week to evaluate for surgical interventions if needed. He may continue to see patient or send back to my clinic with treatment recommendations. We may consider HBO also if patient is a good candidate   Continue to monitor        Plan:     Discharge Instructions       [address]    NOTE: Upon discharge from the [address], you will receive a patient experience survey. We would be grateful if you would take the time to fill this survey out. Wound care order history:     ROSA's   Right       Left    Date    Vascular studies:      Cultures:                Antibiotics:                HbA1c:                 Compression/Lymph Pumps:              Grafts:       Continuing wound care orders and information:              Residence: Private              Continue home health care with:    Your wound-care supplies will be provided by: . [facility]       Wound cleansing:      Do not scrub or use excessive force. Wash hands with soap and water before and after dressing changes. Prior to applying a clean dressing, cleanse wound with normal saline,    wound cleanser, or mild soap and water. Ask your physician or nurse before getting the wound(s) wet in the shower. Daily Wound management:    Keep weight off wounds and reposition every 2 hours. Avoid standing for long periods of time. Apply wraps/stockings in AM and remove at bedtime. Elevate legs to the level of the heart or above for 30 minutes 4-5 times a day and/or when sitting. When taking antibiotics take entire prescription as ordered by MD do not stop taking until medicine is all gone. Orders for this week (5/4/21):  Left Great toe- wash with mild soap and water. Pat dry with 4x4  Cover with Ag+ alginate  Wrap with conform and secure with tape  Change every 2 days. See Dr. Angie Ervin in 1 week  Follow up with Jewel Armstrong CNP in 2 weeks in the wound care center  Call [phone] for any questions or concerns.   Date__________   Time____________                  Treatment Note Wound 05/04/21 Toe (Comment  which one) #1 Great toe-Dressing/Treatment: (ag+alginate; conform;tae)    Written Patient Dismissal Instructions Given          Electronically signed by TIFFANIE Alegria CNP on 5/5/2021 at 8:21 AM

## 2021-05-14 NOTE — PROGRESS NOTES
Tobacco Use    Smoking status: Former Smoker     Quit date: 1997     Years since quittin.0    Smokeless tobacco: Never Used   Substance Use Topics    Alcohol use: No    Drug use: No       ALLERGIES    Allergies   Allergen Reactions    Calmoseptine [Menthol-Zinc Oxide]     Lyrica [Pregabalin] Other (See Comments)     Eyes cross    Pradaxa [Dabigatran Etexilate Mesylate] Other (See Comments)     Headache         MEDICATIONS    Current Outpatient Medications on File Prior to Encounter   Medication Sig Dispense Refill    oxyCODONE-acetaminophen (PERCOCET) 5-325 MG per tablet Take 1 tablet by mouth every 4 hours as needed for Pain.  levoFLOXacin (LEVAQUIN) 750 MG tablet Take 1 tablet by mouth daily for 10 days 10 tablet 0    pantoprazole (PROTONIX) 40 MG tablet Take 40 mg by mouth daily      tamsulosin (FLOMAX) 0.4 MG capsule Take 0.4 mg by mouth daily      vitamin B-6 (PYRIDOXINE) 100 MG tablet Take 100 mg by mouth daily      vitamin B-12 (CYANOCOBALAMIN) 1000 MCG tablet Take 1,000 mcg by mouth daily      guaiFENesin (MUCINEX) 600 MG SR tablet Take 1 tablet by mouth 2 times daily      nystatin (MYCOSTATIN) 555845 UNIT/GM powder Apply topically 4 times daily. 1 Bottle 1    potassium chloride (K-DUR) 10 MEQ tablet Take 2 tablets by mouth daily 30 tablet 0    oxyCODONE-acetaminophen (PERCOCET)  MG per tablet Take 1 tablet by mouth every 6 hours as needed for Pain      insulin glargine (LANTUS) 100 UNIT/ML injection vial Inject 30 Units into the skin nightly. 1 vial 3    pravastatin (PRAVACHOL) 40 MG tablet Take 40 mg by mouth daily.  Lactulose SOLN Take 30 mLs by mouth nightly.  polyethylene glycol (MIRALAX) powder Take 17 g by mouth daily.  insulin glargine (LANTUS) 100 UNIT/ML injection vial Inject  into the skin nightly.  Sliding scale -   If accucheck >200 = 30 units  If accucheck 101-200 = 20 units  Below 100 - NO Lantus      insulin aspart (NOVOLOG) 100 UNIT/ML injection vial Inject 18 Units into the skin 3 times daily (before meals). Plus sliding scale, with 18 units base      docusate sodium 100 MG CAPS Take 100 mg by mouth 2 times daily as needed for Constipation. (Patient taking differently: Take 100 mg by mouth daily.)      sertraline (ZOLOFT) 25 MG tablet Take 2 tablets by mouth daily. (Patient taking differently: Take 100 mg by mouth daily )      trazodone (DESYREL) 50 MG tablet Take 1 tablet by mouth nightly as needed for Sleep. (Patient taking differently: Take 75 mg by mouth nightly as needed for Sleep.)       No current facility-administered medications on file prior to encounter. REVIEW OF SYSTEMS    Pertinent items are noted in HPI. Constitutional: Negative for systemic symptoms including fever, chills and malaise. Objective:      BP 91/60   Pulse 90   Temp 96.9 °F (36.1 °C) (Temporal)   Resp 18     PHYSICAL EXAM  General Appearance: alert and oriented to person, place and time, well-developed and well-nourished, in no acute distress    General: The patient is in no acute distress. Mental status:  Patient is appropriate, is  oriented to place and plan of care. Dermatologic exam: Visual inspection of the periwound reveals the skin to be edematous  Wound exam: see wound description below in procedure note      Assessment:   66 y.o. male with left great toe wound. Likely osteomyelitis although MRI was not a definitive read. Problem List Items Addressed This Visit     WD-Osteomyelitis of great toe of left foot ([address] - Primary    Relevant Orders    Ultrasound doppler arterial legs bilateral    Initiate Outpatient Wound Care Protocol        Procedure Note    Indications:  Based on my examination of this patient's wound(s) today, sharp excision into necrotic subcutaneous tissue is required to promote healing and evaluate the extent of previous healing.     Performed by: Michelle Matos MD    Consent obtained: Yes    Time out taken: Yes    Pain Control: topical lidocaine       Debridement:Excisional Debridement    Using curette the wound(s) was/were sharply debrided down through and including the removal of subcutaneous tissue.         Devitalized Tissue Debrided:  slough and necrotic/eschar    Pre Debridement Measurements:  Are located in the Wound Documentation Flow Sheet    All active wounds listed below with today's date are evaluated  Wound(s)    debrided this date include # : 1     Post  Debridement Measurements:  Wound 10/13/17 Left flank fold (Active)   Number of days: 1308       Wound 10/13/17 Left ischium (Active)   Number of days: 1308       Wound 10/13/17 Right buttock (Active)   Number of days: 1308       Wound 05/04/21 Toe (Comment  which one) #1 Great toe (Active)   Wound Image   05/04/21 1605   Dressing Status Clean;Dry;New dressing applied 05/13/21 1631   Wound Cleansed Cleansed with saline 05/13/21 1544   Offloading for Diabetic Foot Ulcers No offloading required 05/13/21 1544   Wound Length (cm) 0.3 cm 05/13/21 1544   Wound Width (cm) 0.3 cm 05/13/21 1544   Wound Depth (cm) 0.1 cm 05/13/21 1544   Wound Surface Area (cm^2) 0.09 cm^2 05/13/21 1544   Change in Wound Size % (l*w) -50 05/13/21 1544   Wound Volume (cm^3) 0.01 cm^3 05/13/21 1544   Wound Healing % 50 05/13/21 1544   Post-Procedure Length (cm) 0.3 cm 05/13/21 1615   Post-Procedure Width (cm) 0.3 cm 05/13/21 1615   Post-Procedure Depth (cm) 0.1 cm 05/13/21 1615   Post-Procedure Surface Area (cm^2) 0.09 cm^2 05/13/21 1615   Post-Procedure Volume (cm^3) 0.01 cm^3 05/13/21 1615   Distance Tunneling (cm) 0 cm 05/13/21 1544   Tunneling Position ___ O'Clock 0 05/13/21 1544   Undermining Starts ___ O'Clock 0 05/13/21 1544   Undermining Ends___ O'Clock 0 05/13/21 1544   Undermining Maxium Distance (cm) 0 05/13/21 1544   Wound Assessment Dry 05/13/21 1544   Drainage Amount Scant 05/13/21 1544   Drainage Description Serosanguinous 05/13/21 1544   Odor None 05/13/21 1544 Apply wraps/stockings in AM and remove at bedtime. Elevate legs to the level of the heart or above for 30 minutes 4-5 times a day and/or when sitting. When taking antibiotics take entire prescription as ordered by MD do not stop taking until medicine is all gone.                             Orders for this week: (21)  Left Great toe- wash with mild soap and water. Pat dry with 4x4  Pack with Alginate rope  Cover with Ag+ alginate  Wrap with conform and secure with tape  Change every 2 days.       Continuation of Care:  Referral/ Consults: **Please call Central Scheduling to schedule an appointment for vascular studies. **  Pharmacy/ Rx:     Follow up with Estela Chaudhry CNP in 2 weeks in the wound care center  Call [phone] for any questions or concerns. Date__________   Time____________        Treatment Note Wound 21 Toe (Comment  which one) #1 Great toe-Dressing/Treatment: (ca alg, conform, tape)    Written Patient Dismissal Instructions Given     - ROSA was 0.8 in the left foot but >1 in the right. May not be accurate given DM.   Will get arterial US to evaluate blood supply prior to considering debridement/toe amputation  - continue levoquin, will add bactrim for additional coverage       Electronically signed by Nico Blackman MD on 2021 at 10:23 PM

## 2021-07-13 PROBLEM — E08.621 DIABETIC ULCER OF LEFT FOOT ASSOCIATED WITH DIABETES MELLITUS DUE TO UNDERLYING CONDITION, WITH MUSCLE INVOLVEMENT WITHOUT EVIDENCE OF NECROSIS (HCC): Status: ACTIVE | Noted: 2021-01-01

## 2021-07-13 PROBLEM — L97.525 DIABETIC ULCER OF LEFT FOOT ASSOCIATED WITH DIABETES MELLITUS DUE TO UNDERLYING CONDITION, WITH MUSCLE INVOLVEMENT WITHOUT EVIDENCE OF NECROSIS (HCC): Status: ACTIVE | Noted: 2021-01-01

## 2021-07-13 NOTE — PROGRESS NOTES
Wound Care Center Progress Note With Procedure    Mary Jo Reese  AGE: 66 y.o. GENDER: male  : 1945  EPISODE DATE:  2021     Subjective:     Chief Complaint   Patient presents with    Wound Check     left foot          HISTORY of PRESENT ILLNESS      Mary Jo Reese is a 66 y.o. male who presents today for wound evaluation of Chronic diabetic and pressure ulcer(s) of the left lateral foot. The ulcer is of mild severity. The underlying cause of the wound is pressure and diabetes. Necrotic eschar needs removed. Wound Pain Timing/Severity: intermittent, mild  Quality of pain: tender, pressure  Severity of pain:  3 / 10   Modifying Factors: diabetes, shear force and History of CVA with left lower extremity hemiparesis  Associated Signs/Symptoms: edema and drainage        PAST MEDICAL HISTORY        Diagnosis Date    Anxiety     BPH (benign prostatic hyperplasia)     CAD (coronary artery disease)     Constipation     Cyst     liver and pancreas    Debility     Diabetes mellitus ([address]     Diabetic ulcer of left foot associated with diabetes mellitus due to underlying condition, with muscle involvement without evidence of necrosis ([facility] [address] 2021    Dysphagia     GERD (gastroesophageal reflux disease)     Hyperlipidemia     Hypertension     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 No pertinent family history.     SOCIAL HISTORY    Social History     Tobacco Use    Smoking status: Former Smoker     Quit date: 1997     Years since (ZOLOFT) 25 MG tablet Take 2 tablets by mouth daily. (Patient taking differently: Take 100 mg by mouth daily )      trazodone (DESYREL) 50 MG tablet Take 1 tablet by mouth nightly as needed for Sleep. (Patient taking differently: Take 75 mg by mouth nightly as needed for Sleep.)       No current facility-administered medications on file prior to encounter. REVIEW OF SYSTEMS    Pertinent items are noted in HPI. Constitutional: Negative for systemic symptoms including fever, chills and malaise. Objective:      BP 95/66   Pulse 86   Temp 97.1 °F (36.2 °C) (Temporal)   Resp 18     PHYSICAL EXAM      General: The patient is in no acute distress. Mental status:  Patient is appropriate, is  oriented to place and plan of care. Dermatologic exam: Visual inspection of the periwound reveals the skin to be moist  Wound exam: see wound description below in procedure note      Assessment:     Problem List Items Addressed This Visit     WD-Osteomyelitis of great toe of left foot ([address] - Primary    Relevant Medications    lidocaine (XYLOCAINE) 5 % ointment (Start on 7/13/2021 11:00 AM)    Other Relevant Orders    Initiate Outpatient Wound Care Protocol    Diabetic ulcer of left foot associated with diabetes mellitus due to underlying condition, with muscle involvement without evidence of necrosis ([address]    Relevant Orders    Culture, Wound    VL LOWER EXTREMITY ARTERIES BILATERAL        Procedure Note    Indications:  Based on my examination of this patient's wound(s) today, sharp excision into necrotic muscle/fascia is required to promote healing and evaluate the extent of previous healing. Performed by: Wendie Burns MD    Consent obtained: Yes    Time out taken:  Yes    Pain Control:       Debridement:Excisional Debridement    Using scissors and forceps the wound(s) was/were sharply debrided down through and including the removal of muscle/fascia.         Devitalized Tissue Debrided: necrotic/eschar    Pre Debridement Measurements:  Are located in the Wound Documentation Flow Sheet    All active wounds listed below with today's date are evaluated  Wound(s)    debrided this date include # : 1     Post  Debridement Measurements:  Wound 10/13/17 Left flank fold (Active)   Number of days: 1368       Wound 10/13/17 Left ischium (Active)   Number of days: 1368       Wound 10/13/17 Right buttock (Active)   Number of days: 1368       Wound 07/13/21 Foot Left;Lateral #1 foot (Active)   Wound Image   07/13/21 1021   Wound Cleansed Wound cleanser;Cleansed with saline 07/13/21 1021   Offloading for Diabetic Foot Ulcers No offloading required 07/13/21 1021   Wound Length (cm) 0.7 cm 07/13/21 1021   Wound Width (cm) 0.7 cm 07/13/21 1021   Wound Depth (cm) 0.1 cm 07/13/21 1021   Wound Surface Area (cm^2) 0.49 cm^2 07/13/21 1021   Wound Volume (cm^3) 0.049 cm^3 07/13/21 1021   Distance Tunneling (cm) 0 cm 07/13/21 1021   Tunneling Position ___ O'Clock 0 07/13/21 1021   Undermining Starts ___ O'Clock 0 07/13/21 1021   Undermining Ends___ O'Clock 0 07/13/21 1021   Undermining Maxium Distance (cm) 0 07/13/21 1021   Wound Assessment Eschar moist;Dry;Slough 07/13/21 1021   Drainage Amount Small 07/13/21 1021   Drainage Description Yellow 07/13/21 1021   Odor None 07/13/21 1021   Joann-wound Assessment Intact; Blanchable erythema 07/13/21 1021   Margins Defined edges 07/13/21 1021   Wound Thickness Description not for Pressure Injury Full thickness 07/13/21 1021   Number of days: 0       Percent of Wound(s) Debrided: approximately 100%    Total  Area  Debrided:  0.5 sq cm     Bleeding:  None    Hemostasis Achieved:  not needed    Procedural Pain:  3  / 10     Post Procedural Pain:  0 / 10     Response to treatment:  Well tolerated by patient. Status of wound progress and description from last visit:   Improved since I debrided the necrotic eschar since I saw him in my office. Wound culture was obtained.   I will also get arterial lower extremity ultrasound studies. Plan:       Discharge Instructions       PHYSICIAN ORDERS AND DISCHARGE INSTRUCTIONS     NOTE: Upon discharge from the [address], you will receive a patient experience survey. We would be grateful if you would take the time to fill this survey out.     Wound care order history:                 ROSA's   Right       Left    Date               Vascular studies:                 Cultures: Rose Elliott 07/13/21              Antibiotics: Melody Canes              HbA1c:                 Compression/Lymph Pumps:              Grafts:                  Continuing wound care orders and information:              Residence: Private              Continue home health care with: [facility] once a week. Daughter (nurse) also lives with pt and helps with dressing changes              Your wound-care supplies will be provided by: [facility]                            XSGOF cleansing:                           AI not scrub or use excessive force.                          RNUZ hands with soap and water before and after dressing changes.                           Prior to applying a clean dressing, cleanse wound with normal saline,                          wound cleanser, or mild soap and water.                           Ask your physician or nurse before getting the wound(s) wet in the shower.              Daily Wound management:                          Keep weight off wounds and reposition every 2 hours.                          Avoid standing for long periods of time.                          Apply wraps/stockings in AM and remove at bedtime.                          Elevate legs to the level of the heart or above for 30 minutes 4-5 times a day and/or when sitting.                                               When taking antibiotics take entire prescription as ordered by MD do not stop taking until medicine is all gone.                             Orders for this week: (7/13/21)  Left Lateral Foot- wash

## 2021-07-20 NOTE — PROGRESS NOTES
Wound Care Center Progress Note With Procedure    Donte Gómez  AGE: 66 y.o. GENDER: male  : 1945  EPISODE DATE:  2021     Subjective:     Chief Complaint   Patient presents with    Wound Check     L foot          HISTORY of PRESENT ILLNESS      Donte Gómez is a 66 y.o. male who presents today for wound evaluation of Chronic diabetic and pressure ulcer(s) of the left lateral foot. The ulcer is of mild severity. The underlying cause of the wound is diabetes and pressure. Moderately improved. Still trying to schedule his arterial study. Wound Pain Timing/Severity: mild  Quality of pain: tender  Severity of pain:  3 / 10   Modifying Factors: diabetes, decreased mobility and shear force  Associated Signs/Symptoms: drainage        PAST MEDICAL HISTORY        Diagnosis Date    Anxiety     BPH (benign prostatic hyperplasia)     CAD (coronary artery disease)     Constipation     Cyst     liver and pancreas    Debility     Diabetes mellitus ([address]     Diabetic ulcer of left foot associated with diabetes mellitus due to underlying condition, with muscle involvement without evidence of necrosis ([address] 2021    Dysphagia     GERD (gastroesophageal reflux disease)     Hyperlipidemia     Hypertension     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 No pertinent family history.     SOCIAL HISTORY    Social History     Tobacco Use    Smoking status: Former Smoker     Quit date: 1997     Years since quittin.1    Smokeless tobacco: Never daily. (Patient taking differently: Take 100 mg by mouth daily )      trazodone (DESYREL) 50 MG tablet Take 1 tablet by mouth nightly as needed for Sleep. (Patient taking differently: Take 75 mg by mouth nightly as needed for Sleep.)       No current facility-administered medications on file prior to encounter. REVIEW OF SYSTEMS    Pertinent items are noted in HPI. Constitutional: Negative for systemic symptoms including fever, chills and malaise. Objective: There were no vitals taken for this visit. PHYSICAL EXAM      General: The patient is in no acute distress. Mental status:  Patient is appropriate, is  oriented to place and plan of care. Dermatologic exam: Visual inspection of the periwound reveals the skin to be moist  Wound exam: see wound description below in procedure note      Assessment:     Problem List Items Addressed This Visit     WD-Osteomyelitis of great toe of left foot ([facility]) - Primary    Relevant Medications    lidocaine (XYLOCAINE) 4 % external solution    Other Relevant Orders    Initiate Outpatient Wound Care Protocol    Diabetic ulcer of left foot associated with diabetes mellitus due to underlying condition, with muscle involvement without evidence of necrosis ([facility] [address]        Procedure Note    Indications:  Based on my examination of this patient's wound(s) today, sharp excision into necrotic subcutaneous tissue is required to promote healing and evaluate the extent of previous healing. Performed by: Melody Land MD    Consent obtained: Yes    Time out taken:  Yes    Pain Control:       Debridement:Excisional Debridement    Using #15 blade scalpel the wound(s) was/were sharply debrided down through and including the removal of subcutaneous tissue.         Devitalized Tissue Debrided:  slough and exudate    Pre Debridement Measurements:  Are located in the Wound Documentation Flow Sheet    All active wounds listed below with today's date are evaluated  Wound(s)    debrided this date include # : 1     Post  Debridement Measurements:  Wound 10/13/17 Left flank fold (Active)   Number of days: 6520       Wound 10/13/17 Left ischium (Active)   Number of days: 7553       Wound 10/13/17 Right buttock (Active)   Number of days: 1375       Wound 07/13/21 Foot #1 Left Lateral Foot (Active)   Wound Image   07/13/21 1021   Wound Etiology Diabetic 07/13/21 1101   Dressing Status New dressing applied 07/20/21 1056   Wound Cleansed Wound cleanser;Cleansed with saline 07/20/21 1011   Dressing/Treatment Alginate;ABD 07/13/21 1101   Offloading for Diabetic Foot Ulcers No offloading required 07/20/21 1011   Wound Length (cm) 0.5 cm 07/20/21 1011   Wound Width (cm) 0.7 cm 07/20/21 1011   Wound Depth (cm) 0.1 cm 07/20/21 1011   Wound Surface Area (cm^2) 0.35 cm^2 07/20/21 1011   Change in Wound Size % (l*w) 28.57 07/20/21 1011   Wound Volume (cm^3) 0.035 cm^3 07/20/21 1011   Wound Healing % 29 07/20/21 1011   Post-Procedure Length (cm) 0.5 cm 07/20/21 1047   Post-Procedure Width (cm) 0.7 cm 07/20/21 1047   Post-Procedure Depth (cm) 0.1 cm 07/20/21 1047   Post-Procedure Surface Area (cm^2) 0.35 cm^2 07/20/21 1047   Post-Procedure Volume (cm^3) 0.035 cm^3 07/20/21 1047   Distance Tunneling (cm) 0 cm 07/20/21 1011   Tunneling Position ___ O'Clock 0 07/20/21 1011   Undermining Starts ___ O'Clock 0 07/20/21 1011   Undermining Ends___ O'Clock 0 07/20/21 1011   Undermining Maxium Distance (cm) 0 07/20/21 1011   Wound Assessment Slough 07/20/21 1011   Drainage Amount Moderate 07/20/21 1011   Drainage Description Yellow 07/20/21 1011   Odor None 07/20/21 1011   Joann-wound Assessment Intact; Blanchable erythema 07/20/21 1011   Margins Defined edges 07/20/21 1011   Wound Thickness Description not for Pressure Injury Full thickness 07/20/21 1011   Number of days: 7       Percent of Wound(s) Debrided: approximately 100%    Total  Area  Debrided:  0.35 sq cm     Bleeding: Minimal    Hemostasis Achieved:  not needed    Procedural Pain:  2  / 10     Post Procedural Pain:  0 / 10     Response to treatment:  Well tolerated by patient. Status of wound progress and description from last visit:   Moderately improved. Plan:       Discharge Instructions       PHYSICIAN ORDERS AND DISCHARGE INSTRUCTIONS     NOTE: Upon discharge from the [address], you will receive a patient experience survey. We would be grateful if you would take the time to fill this survey out.     Wound care order history:                 ROSA's   Right       Left    Date               Vascular studies:                 Cultures:  7/13/21              Antibiotics: Camilo Mosley              HbA1c:                 Compression/Lymph Pumps:              Grafts:                  Continuing wound care orders and information:              Residence: Private              Continue home health care with: [facility] once a week. Daughter (nurse) also lives with pt and helps with dressing changes              Your wound-care supplies will be provided by: [facility]                            XASRD cleansing:                           YX not scrub or use excessive force.                          BSUY hands with soap and water before and after dressing changes.                           Prior to applying a clean dressing, cleanse wound with normal saline,                          wound cleanser, or mild soap and water.                           Ask your physician or nurse before getting the wound(s) wet in the shower.              Daily Wound management:                          Keep weight off wounds and reposition every 2 hours.                          JWNTZ standing for long periods of time.                          KTRXD wraps/stockings in AM and remove at bedtime.                          Elevate legs to the level of the heart or above for 30 minutes 4-5 times a day and/or when sitting.                                               When taking antibiotics take entire prescription as ordered by MD do not stop taking until medicine is all gone.                             Orders for this week: (21)  Left Lateral Foot- wash with mild soap and water. Pat dry with 4x4  Apply rick to wound bed  Cover with mepilex foam  Wrap with Kerlix and secure with tape  Change every day.       Continuation of Care:  Referral/ Consults: **Please call Central Scheduling to schedule an appointment for vascular studies. **  Pharmacy/ Rx:      Follow up with Dr Klaus Ibrahim CNP in 1 weeks in the wound care center  Call [phone] for any questions or concerns.   Date__________   Time____________  Please call central scheduling to make appointment for vascular studies  Central [phone]        Treatment Note Wound 21 Foot #1 Left Lateral Foot-Dressing/Treatment:  (rick, foam, kerlix, tape)    Written Patient Dismissal Instructions Given            Electronically signed by Mihir Tapia MD on 2021 at 11:29 AM

## 2021-07-27 PROBLEM — L97.522 DIABETIC ULCER OF LEFT FOOT ASSOCIATED WITH DIABETES MELLITUS DUE TO UNDERLYING CONDITION, WITH FAT LAYER EXPOSED (HCC): Status: ACTIVE | Noted: 2021-01-01

## 2021-07-27 NOTE — PROGRESS NOTES
Wound Care Center Progress Note With Procedure    Nichelle Naylor  AGE: 66 y.o. GENDER: male  : 1945  EPISODE DATE:  2021     Subjective:     Chief Complaint   Patient presents with    Wound Check     L foot          HISTORY of PRESENT ILLNESS      Nichelle Naylor is a 66 y.o. male who presents today for wound evaluation of Chronic diabetic and pressure ulcer(s) of the left lateral foot and left heel. The ulcer is of mild severity. The underlying cause of the wound is pressure and diabetes and shear affect. Small little blister on the left heel is new and I removed and there is no ulceration underneath. Left lateral foot area is improving slowly. They are still trying to schedule his arterial ultrasound.   Wound Pain Timing/Severity: none  Quality of pain: N/A  Severity of pain:  0 / 10   Modifying Factors: diabetes, decreased mobility and shear force  Associated Signs/Symptoms: drainage        PAST MEDICAL HISTORY        Diagnosis Date    Anxiety     BPH (benign prostatic hyperplasia)     CAD (coronary artery disease)     Constipation     Cyst     liver and pancreas    Debility     Diabetes mellitus ([address]     Diabetic ulcer of left foot associated with diabetes mellitus due to underlying condition, with fat layer exposed ([address] 2021    Diabetic ulcer of left foot associated with diabetes mellitus due to underlying condition, with muscle involvement without evidence of necrosis ([address] 2021    Dysphagia     GERD (gastroesophageal reflux disease)     Hyperlipidemia     Hypertension     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 No pertinent family history. SOCIAL HISTORY    Social History     Tobacco Use    Smoking status: Former Smoker     Quit date: 1997     Years since quittin.2    Smokeless tobacco: Never Used   Substance Use Topics    Alcohol use: No    Drug use: No       ALLERGIES    Allergies   Allergen Reactions    Calmoseptine [Menthol-Zinc Oxide]     Lyrica [Pregabalin] Other (See Comments)     Eyes cross    Pradaxa [Dabigatran Etexilate Mesylate] Other (See Comments)     Headache         MEDICATIONS    Current Outpatient Medications on File Prior to Encounter   Medication Sig Dispense Refill    cefadroxil (DURICEF) 1 g tablet Take 1 g by mouth once      oxyCODONE-acetaminophen (PERCOCET) 5-325 MG per tablet Take 1 tablet by mouth every 4 hours as needed for Pain.  pantoprazole (PROTONIX) 40 MG tablet Take 40 mg by mouth daily      tamsulosin (FLOMAX) 0.4 MG capsule Take 0.4 mg by mouth daily      vitamin B-6 (PYRIDOXINE) 100 MG tablet Take 100 mg by mouth daily      vitamin B-12 (CYANOCOBALAMIN) 1000 MCG tablet Take 1,000 mcg by mouth daily      guaiFENesin (MUCINEX) 600 MG SR tablet Take 1 tablet by mouth 2 times daily      nystatin (MYCOSTATIN) 158032 UNIT/GM powder Apply topically 4 times daily. 1 Bottle 1    oxyCODONE-acetaminophen (PERCOCET)  MG per tablet Take 1 tablet by mouth every 6 hours as needed for Pain      pravastatin (PRAVACHOL) 40 MG tablet Take 40 mg by mouth daily.  polyethylene glycol (MIRALAX) powder Take 17 g by mouth daily.  insulin glargine (LANTUS) 100 UNIT/ML injection vial Inject  into the skin nightly. Sliding scale -   If accucheck >200 = 30 units  If accucheck 101-200 = 20 units  Below 100 - NO Lantus      insulin aspart (NOVOLOG) 100 UNIT/ML injection vial Inject 18 Units into the skin 3 times daily (before meals).  Plus sliding scale, with 18 exudate    Pre Debridement Measurements:  Are located in the Wound Documentation Flow Sheet    All active wounds listed below with today's date are evaluated  Wound(s)    debrided this date include # : 1     Post  Debridement Measurements:  Wound 10/13/17 Left flank fold (Active)   Number of days: 1382       Wound 10/13/17 Left ischium (Active)   Number of days: 8805       Wound 10/13/17 Right buttock (Active)   Number of days: 1382       Wound 07/13/21 Foot #1 Left Lateral Foot (Active)   Wound Image   07/27/21 1013   Wound Etiology Diabetic 07/13/21 1101   Dressing Status New dressing applied 07/20/21 1056   Wound Cleansed Wound cleanser;Cleansed with saline 07/27/21 1013   Dressing/Treatment Alginate;ABD 07/13/21 1101   Offloading for Diabetic Foot Ulcers No offloading required 07/27/21 1013   Wound Length (cm) 0.7 cm 07/27/21 1013   Wound Width (cm) 0.7 cm 07/27/21 1013   Wound Depth (cm) 0.1 cm 07/27/21 1013   Wound Surface Area (cm^2) 0.49 cm^2 07/27/21 1013   Change in Wound Size % (l*w) 0 07/27/21 1013   Wound Volume (cm^3) 0.049 cm^3 07/27/21 1013   Wound Healing % 0 07/27/21 1013   Post-Procedure Length (cm) 0.7 cm 07/27/21 1045   Post-Procedure Width (cm) 0.7 cm 07/27/21 1045   Post-Procedure Depth (cm) 0.1 cm 07/27/21 1045   Post-Procedure Surface Area (cm^2) 0.49 cm^2 07/27/21 1045   Post-Procedure Volume (cm^3) 0.049 cm^3 07/27/21 1045   Distance Tunneling (cm) 0 cm 07/27/21 1013   Tunneling Position ___ O'Clock 0 07/27/21 1013   Undermining Starts ___ O'Clock 0 07/27/21 1013   Undermining Ends___ O'Clock 0 07/27/21 1013   Undermining Maxium Distance (cm) 0 07/27/21 1013   Wound Assessment Eschar moist 07/27/21 1013   Drainage Amount Moderate 07/27/21 1013   Drainage Description Yellow 07/27/21 1013   Odor None 07/27/21 1013   Joann-wound Assessment Intact; Blanchable erythema;Dry/flaky 07/27/21 1013   Margins Defined edges 07/27/21 1013   Wound Thickness Description not for Pressure Injury Full thickness 07/27/21 1013   Number of days: 14       Percent of Wound(s) Debrided: approximately 100%    Total  Area  Debrided:  0.5 sq cm     Bleeding:  Minimal    Hemostasis Achieved:  by pressure    Procedural Pain:  0  / 10     Post Procedural Pain:  0 / 10     Response to treatment:  Well tolerated by patient. Status of wound progress and description from last visit:   Slightly improved. Plan:       Discharge Instructions       PHYSICIAN ORDERS AND DISCHARGE INSTRUCTIONS     NOTE: Upon discharge from the [address], you will receive a patient experience survey. We would be grateful if you would take the time to fill this survey out.     Wound care order history:                 ROSA's   Right       Left    Date               Vascular studies:                 Cultures:  7/13/21              Antibiotics: Ruthie Saner              HbA1c:                 Compression/Lymph Pumps:              Grafts:                  Continuing wound care orders and information:              Residence: Private              Continue home health care with: [facility] once a week. Daughter (nurse) also lives with pt and helps with dressing changes              Your wound-care supplies will be provided by: Blaise YOUNG cleansing:                           QX not scrub or use excessive force.                          SKFQ hands with soap and water before and after dressing changes.                           Prior to applying a clean dressing, cleanse wound with normal saline,                          wound cleanser, or mild soap and water.                           Ask your physician or nurse before getting the wound(s) wet in the shower.              Daily Wound management:                          Keep weight off wounds and reposition every 2 hours.                          TDDGP standing for long periods of time.                          TEMEO wraps/stockings in AM and remove at bedtime.                          Elevate legs to the level of the heart or above for 30 minutes 4-5 times a day and/or when sitting.                                               When taking antibiotics take entire prescription as ordered by MD do not stop taking until medicine is all gone.                             Orders for this week: (21)  Left Lateral Foot- wash with mild soap and water. Pat dry with 4x4  Apply rick to wound bed  Cover with mepilex foam  Wrap with Kerlix and secure with tape  Change every day.         Continuation of Care:  Referral/ Consults:   Pharmacy/ Rx:      Follow up with Dr Myrna Lund in 2 weeks in the wound care center  Call [phone] for any questions or concerns.   Date__________   Time____________  Please call central scheduling to make appointment for vascular studies  Central [phone]        Treatment Note      Written Patient Dismissal Instructions Given            Electronically signed by Wendie Burns MD on 2021 at 10:46 AM

## 2021-08-10 NOTE — PROGRESS NOTES
Wound Care Center Progress Note With Procedure    Anna Dillon  AGE: 66 y.o. GENDER: male  : 1945  EPISODE DATE:  8/10/2021     Subjective:     Chief Complaint   Patient presents with    Wound Check     left foot         HISTORY of PRESENT ILLNESS      Anna Dillon is a 66 y.o. male who presents today for wound evaluation of Chronic diabetic and pressure ulcer(s) of the left lateral foot. The ulcer is of mild severity. The underlying cause of the wound is diabetes and pressure. Moderately improved. Wound Pain Timing/Severity: mild  Quality of pain: dull, aching  Severity of pain:  2 / 10   Modifying Factors: lymphedema, chronic pressure and decreased mobility  Associated Signs/Symptoms: drainage and pain        PAST MEDICAL HISTORY        Diagnosis Date    Anxiety     BPH (benign prostatic hyperplasia)     CAD (coronary artery disease)     Constipation     Cyst     liver and pancreas    Debility     Diabetes mellitus ([address]     Diabetic ulcer of left foot associated with diabetes mellitus due to underlying condition, with fat layer exposed ([address] 2021    Diabetic ulcer of left foot associated with diabetes mellitus due to underlying condition, with muscle involvement without evidence of necrosis ([address] 2021    Dysphagia     GERD (gastroesophageal reflux disease)     Hyperlipidemia     Hypertension     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 No pertinent family history.     SOCIAL HISTORY    Social History     Tobacco Use    Smoking status: Former Smoker     Quit date: 1997     Years since quittin.2    Smokeless tobacco: Never Used   Substance Use Topics    Alcohol use: No    Drug use: No       ALLERGIES    Allergies   Allergen Reactions    Calmoseptine [Menthol-Zinc Oxide]     Lyrica [Pregabalin] Other (See Comments)     Eyes cross    Pradaxa [Dabigatran Etexilate Mesylate] Other (See Comments)     Headache         MEDICATIONS    Current Outpatient Medications on File Prior to Encounter   Medication Sig Dispense Refill    cefadroxil (DURICEF) 1 g tablet Take 1 g by mouth once      oxyCODONE-acetaminophen (PERCOCET) 5-325 MG per tablet Take 1 tablet by mouth every 4 hours as needed for Pain.  pantoprazole (PROTONIX) 40 MG tablet Take 40 mg by mouth daily      tamsulosin (FLOMAX) 0.4 MG capsule Take 0.4 mg by mouth daily      vitamin B-6 (PYRIDOXINE) 100 MG tablet Take 100 mg by mouth daily      vitamin B-12 (CYANOCOBALAMIN) 1000 MCG tablet Take 1,000 mcg by mouth daily      guaiFENesin (MUCINEX) 600 MG SR tablet Take 1 tablet by mouth 2 times daily      nystatin (MYCOSTATIN) 625776 UNIT/GM powder Apply topically 4 times daily. 1 Bottle 1    oxyCODONE-acetaminophen (PERCOCET)  MG per tablet Take 1 tablet by mouth every 6 hours as needed for Pain      pravastatin (PRAVACHOL) 40 MG tablet Take 40 mg by mouth daily.  polyethylene glycol (MIRALAX) powder Take 17 g by mouth daily.  insulin glargine (LANTUS) 100 UNIT/ML injection vial Inject  into the skin nightly. Sliding scale -   If accucheck >200 = 30 units  If accucheck 101-200 = 20 units  Below 100 - NO Lantus      insulin aspart (NOVOLOG) 100 UNIT/ML injection vial Inject 18 Units into the skin 3 times daily (before meals). Plus sliding scale, with 18 units base      docusate sodium 100 MG CAPS Take 100 mg by mouth 2 times daily as needed for Constipation.  (Patient taking differently: Take 100 mg by mouth daily.)      sertraline (ZOLOFT) 25 MG tablet Take 2 tablets by mouth daily. (Patient taking differently: Take 100 mg by mouth daily )      trazodone (DESYREL) 50 MG tablet Take 1 tablet by mouth nightly as needed for Sleep. (Patient taking differently: Take 75 mg by mouth nightly as needed for Sleep.)       No current facility-administered medications on file prior to encounter. REVIEW OF SYSTEMS    Pertinent items are noted in HPI. Constitutional: Negative for systemic symptoms including fever, chills and malaise. Objective:      /65   Pulse 84   Temp 97.4 °F (36.3 °C) (Temporal)   Resp 16     PHYSICAL EXAM      General: The patient is in no acute distress. Mental status:  Patient is appropriate, is not oriented to place and plan of care. Dermatologic exam: Visual inspection of the periwound reveals the skin to be dry and cool   Wound exam: see wound description below in procedure note      Assessment:     Problem List Items Addressed This Visit     WD-Osteomyelitis of great toe of left foot (HCC)    Relevant Medications    lidocaine (XYLOCAINE) 4 % external solution    Other Relevant Orders    Initiate Outpatient Wound Care Protocol    Diabetic ulcer of left foot associated with diabetes mellitus due to underlying condition, with fat layer exposed ([address] - Primary    Relevant Medications    lidocaine (XYLOCAINE) 4 % external solution    Other Relevant Orders    Initiate Outpatient Wound Care Protocol        Procedure Note    Indications:  Based on my examination of this patient's wound(s) today, sharp excision into necrotic subcutaneous tissue is required to promote healing and evaluate the extent of previous healing.     Performed by: Glenn Crystal MD    Consent obtained: Yes    Time out taken:  Yes    Pain Control: Anesthetic  Anesthetic: 4% Lidocaine Liquid Topical     Debridement:Excisional Debridement    Using #15 blade scalpel the wound(s) was/were sharply debrided down through and including the removal of subcutaneous tissue.         Devitalized Tissue Debrided:  slough    Pre Debridement Measurements:  Are located in the Wound Documentation Flow Sheet    All active wounds listed below with today's date are evaluated  Wound(s)    debrided this date include # : 1     Post  Debridement Measurements:  Wound 10/13/17 Left flank fold (Active)   Number of days: 1396       Wound 10/13/17 Left ischium (Active)   Number of days: 1396       Wound 10/13/17 Right buttock (Active)   Number of days: 1396       Wound 07/13/21 Foot #1 Left Lateral Foot (Active)   Wound Image   07/27/21 1013   Wound Etiology Diabetic 07/13/21 1101   Dressing Status New dressing applied 07/27/21 1217   Wound Cleansed Cleansed with saline 08/10/21 1039   Dressing/Treatment Alginate;ABD 07/13/21 1101   Offloading for Diabetic Foot Ulcers Other (comment) 08/10/21 1039   Wound Length (cm) 0.7 cm 08/10/21 1039   Wound Width (cm) 0.5 cm 08/10/21 1039   Wound Depth (cm) 0.1 cm 08/10/21 1039   Wound Surface Area (cm^2) 0.35 cm^2 08/10/21 1039   Change in Wound Size % (l*w) 28.57 08/10/21 1039   Wound Volume (cm^3) 0.035 cm^3 08/10/21 1039   Wound Healing % 29 08/10/21 1039   Post-Procedure Length (cm) 0.7 cm 08/10/21 1110   Post-Procedure Width (cm) 0.5 cm 08/10/21 1110   Post-Procedure Depth (cm) 0.1 cm 08/10/21 1110   Post-Procedure Surface Area (cm^2) 0.35 cm^2 08/10/21 1110   Post-Procedure Volume (cm^3) 0.035 cm^3 08/10/21 1110   Distance Tunneling (cm) 0 cm 08/10/21 1039   Tunneling Position ___ O'Clock 0 08/10/21 1039   Undermining Starts ___ O'Clock 0 08/10/21 1039   Undermining Ends___ O'Clock 0 08/10/21 1039   Undermining Maxium Distance (cm) 0 08/10/21 1039   Wound Assessment Eschar dry 08/10/21 1039   Drainage Amount None 08/10/21 1039   Drainage Description Yellow 07/27/21 1013   Odor None 08/10/21 1039   Joann-wound Assessment Blanchable erythema 08/10/21 1039   Margins Attached edges 08/10/21 1039   Wound Thickness Description not for Pressure Injury Partial thickness 08/10/21 1039   Number of days: 28       Percent of Wound(s) Debrided: approximately 100%    Total  Area  Debrided:  0.35 sq cm     Bleeding:  Minimal    Hemostasis Achieved:  by silver nitrate stick    Procedural Pain:  2  / 10     Post Procedural Pain:  0 / 10     Response to treatment:  Well tolerated by patient. Status of wound progress and description from last visit:   Moderately improved. Plan:       Discharge Instructions       PHYSICIAN ORDERS AND DISCHARGE INSTRUCTIONS     NOTE: Upon discharge from the [address], you will receive a patient experience survey. We would be grateful if you would take the time to fill this survey out.     Wound care order history:                 ROSA's   Right       Left    Date               Vascular studies:                 Cultures:  7/13/21              Antibiotics: Camilo Camera              HbA1c:                 Compression/Lymph Pumps:              Grafts:                  Continuing wound care orders and information:              Residence: Private              Continue home health care with: [facility] once a week. Daughter (nurse) also lives with pt and helps with dressing changes              Your wound-care supplies will be provided by: Blaise BROWN cleansing:                           WI not scrub or use excessive force.                          NSQX hands with soap and water before and after dressing changes.                           Prior to applying a clean dressing, cleanse wound with normal saline,                          wound cleanser, or mild soap and water.                           Ask your physician or nurse before getting the wound(s) wet in the shower.              Daily Wound management:                          Keep weight off wounds and reposition every 2 hours.                          EFVPL standing for long periods of time.                          DOFOS wraps/stockings in AM and remove at bedtime.                          Elevate legs to the level of the heart or above for 30 minutes 4-5 times a day and/or when sitting.                                               When taking antibiotics take entire prescription as ordered by MD do not stop taking until medicine is all gone.                             Orders for this week: (8/10/21)  Left Lateral Foot- wash with mild soap and water. Pat dry with 4x4  Apply rick to wound bed  Cover with mepilex foam  Wrap with Kerlix and secure with tape  Change every day.          Continuation of Care:  Referral/ Consults:   Pharmacy/ Rx:      Follow up with Dr Bethanie Aponte in 2 weeks in the wound care center  Call [phone] for any questions or concerns.   Date__________   Time____________  Please call central scheduling to make appointment for vascular studies  Central [phone]        Treatment Note      Written Patient Dismissal Instructions Given            Electronically signed by Marlene Lo MD on 8/10/2021 at 11:13 AM

## 2021-08-24 NOTE — PROGRESS NOTES
Wound Care Center Progress Note With Procedure    Mary Jo Reese  AGE: 66 y.o. GENDER: male  : 1945  EPISODE DATE:  2021     Subjective:     Chief Complaint   Patient presents with    Wound Check     RLE         HISTORY of PRESENT ILLNESS      Mary Jo Reese is a 66 y.o. male who presents today for wound evaluation of Chronic diabetic and pressure ulcer(s) of the left lateral foot. The ulcer is of mild severity. The underlying cause of the wound is diabetes and pressure. Slightly improved. Wound Pain Timing/Severity: intermittent, mild  Quality of pain: aching  Severity of pain:  2 / 10   Modifying Factors: diabetes, chronic pressure and decreased mobility  Associated Signs/Symptoms: drainage        PAST MEDICAL HISTORY        Diagnosis Date    Anxiety     BPH (benign prostatic hyperplasia)     CAD (coronary artery disease)     Constipation     Cyst     liver and pancreas    Debility     Diabetes mellitus ([address]     Diabetic ulcer of left foot associated with diabetes mellitus due to underlying condition, with fat layer exposed ([address] 2021    Diabetic ulcer of left foot associated with diabetes mellitus due to underlying condition, with muscle involvement without evidence of necrosis ([address] 2021    Dysphagia     GERD (gastroesophageal reflux disease)     Hyperlipidemia     Hypertension     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     SOCIAL HISTORY    Social History     Tobacco Use    Smoking status: Former Smoker Quit date: 1997     Years since quittin.2    Smokeless tobacco: Never Used   Substance Use Topics    Alcohol use: No    Drug use: No       ALLERGIES    Allergies   Allergen Reactions    Calmoseptine [Menthol-Zinc Oxide]     Lyrica [Pregabalin] Other (See Comments)     Eyes cross    Pradaxa [Dabigatran Etexilate Mesylate] Other (See Comments)     Headache         MEDICATIONS    Current Outpatient Medications on File Prior to Encounter   Medication Sig Dispense Refill    cefadroxil (DURICEF) 1 g tablet Take 1 g by mouth once      oxyCODONE-acetaminophen (PERCOCET) 5-325 MG per tablet Take 1 tablet by mouth every 4 hours as needed for Pain.  pantoprazole (PROTONIX) 40 MG tablet Take 40 mg by mouth daily      tamsulosin (FLOMAX) 0.4 MG capsule Take 0.4 mg by mouth daily      vitamin B-6 (PYRIDOXINE) 100 MG tablet Take 100 mg by mouth daily      vitamin B-12 (CYANOCOBALAMIN) 1000 MCG tablet Take 1,000 mcg by mouth daily      guaiFENesin (MUCINEX) 600 MG SR tablet Take 1 tablet by mouth 2 times daily      nystatin (MYCOSTATIN) 720108 UNIT/GM powder Apply topically 4 times daily. 1 Bottle 1    oxyCODONE-acetaminophen (PERCOCET)  MG per tablet Take 1 tablet by mouth every 6 hours as needed for Pain      pravastatin (PRAVACHOL) 40 MG tablet Take 40 mg by mouth daily.  polyethylene glycol (MIRALAX) powder Take 17 g by mouth daily.  insulin glargine (LANTUS) 100 UNIT/ML injection vial Inject  into the skin nightly. Sliding scale -   If accucheck >200 = 30 units  If accucheck 101-200 = 20 units  Below 100 - NO Lantus      insulin aspart (NOVOLOG) 100 UNIT/ML injection vial Inject 18 Units into the skin 3 times daily (before meals). Plus sliding scale, with 18 units base      docusate sodium 100 MG CAPS Take 100 mg by mouth 2 times daily as needed for Constipation.  (Patient taking differently: Take 100 mg by mouth daily.)      sertraline (ZOLOFT) 25 MG tablet Take 2 tablets by mouth daily. (Patient taking differently: Take 100 mg by mouth daily )      trazodone (DESYREL) 50 MG tablet Take 1 tablet by mouth nightly as needed for Sleep. (Patient taking differently: Take 75 mg by mouth nightly as needed for Sleep.)       No current facility-administered medications on file prior to encounter. REVIEW OF SYSTEMS    Pertinent items are noted in HPI. Constitutional: Negative for systemic symptoms including fever, chills and malaise. Objective:      /62   Pulse 81   Temp 97.1 °F (36.2 °C) (Temporal)   Resp 18     PHYSICAL EXAM      General: The patient is in no acute distress. Mental status:  Patient is appropriate, is  oriented to place and plan of care. Dermatologic exam: Visual inspection of the periwound reveals the skin to be moist and cool   Wound exam: see wound description below in procedure note      Assessment:     Problem List Items Addressed This Visit     WD-Osteomyelitis of great toe of left foot (HCC)    Relevant Medications    lidocaine (XYLOCAINE) 4 % external solution (Start on 8/24/2021 10:45 AM)    Other Relevant Orders    Initiate Outpatient Wound Care Protocol    Diabetic ulcer of left foot associated with diabetes mellitus due to underlying condition, with fat layer exposed ([facility] [address] - Primary    Relevant Medications    lidocaine (XYLOCAINE) 4 % external solution (Start on 8/24/2021 10:45 AM)    Other Relevant Orders    Initiate Outpatient Wound Care Protocol        Procedure Note    Indications:  Based on my examination of this patient's wound(s) today, sharp excision into necrotic subcutaneous tissue is required to promote healing and evaluate the extent of previous healing. Performed by: Paola Klein MD    Consent obtained: Yes    Time out taken:  Yes    Pain Control:       Debridement:Excisional Debridement    Using #15 blade scalpel the wound(s) was/were sharply debrided down through and including the removal of subcutaneous tissue. Devitalized Tissue Debrided:  slough and exudate    Pre Debridement Measurements:  Are located in the Wound Documentation Flow Sheet    All active wounds listed below with today's date are evaluated  Wound(s)    debrided this date include # : 1     Post  Debridement Measurements:  Wound 10/13/17 Left flank fold (Active)   Number of days: 1410       Wound 10/13/17 Left ischium (Active)   Number of days: 1410       Wound 10/13/17 Right buttock (Active)   Number of days: 1410       Wound 07/13/21 Foot #1 Left Lateral Foot (Active)   Wound Image   07/27/21 1013   Wound Etiology Diabetic 07/13/21 1101   Dressing Status Clean;Dry; Intact; New dressing applied 08/10/21 1117   Wound Cleansed Wound cleanser 08/24/21 1008   Dressing/Treatment Silver dressing; Foam 08/10/21 1117   Offloading for Diabetic Foot Ulcers Yes (type); Other (comment) 08/24/21 1008   Wound Length (cm) 0.7 cm 08/24/21 1008   Wound Width (cm) 0.7 cm 08/24/21 1008   Wound Depth (cm) 0.1 cm 08/24/21 1008   Wound Surface Area (cm^2) 0.49 cm^2 08/24/21 1008   Change in Wound Size % (l*w) 0 08/24/21 1008   Wound Volume (cm^3) 0.049 cm^3 08/24/21 1008   Wound Healing % 0 08/24/21 1008   Post-Procedure Length (cm) 0.7 cm 08/24/21 1033   Post-Procedure Width (cm) 0.7 cm 08/24/21 1033   Post-Procedure Depth (cm) 0.1 cm 08/24/21 1033   Post-Procedure Surface Area (cm^2) 0.49 cm^2 08/24/21 1033   Post-Procedure Volume (cm^3) 0.049 cm^3 08/24/21 1033   Distance Tunneling (cm) 0 cm 08/24/21 1008   Tunneling Position ___ O'Clock 0 08/24/21 1008   Undermining Starts ___ O'Clock 0 08/24/21 1008   Undermining Ends___ O'Clock 0 08/24/21 1008   Undermining Maxium Distance (cm) 0 08/24/21 1008   Wound Assessment Eschar dry 08/24/21 1008   Drainage Amount Moderate 08/24/21 1008   Drainage Description Yellow 08/24/21 1008   Odor None 08/24/21 1008   Joann-wound Assessment Blanchable erythema 08/24/21 1008   Margins Attached edges 08/24/21 1008   Wound Thickness Description not for Pressure Injury Partial thickness 08/24/21 1008   Number of days: 42       Percent of Wound(s) Debrided: approximately 100%    Total  Area  Debrided:  0.5 sq cm     Bleeding:  Minimal    Hemostasis Achieved:  not needed    Procedural Pain:  4  / 10     Post Procedural Pain:  0 / 10     Response to treatment:  Well tolerated by patient. Status of wound progress and description from last visit:   Slight improvement. Plan:       Discharge Instructions       PHYSICIAN ORDERS AND DISCHARGE INSTRUCTIONS     NOTE: Upon discharge from the [address], you will receive a patient experience survey. We would be grateful if you would take the time to fill this survey out.     Wound care order history:                 ROSA's   Right       Left    Date               Vascular studies:                 Cultures:  7/13/21              Antibiotics: Kayren Butch              HbA1c:                 Compression/Lymph Pumps:              Grafts:                  Continuing wound care orders and information:              Residence: Private              Continue home health care with: [facility] once a week. Daughter (nurse) also lives with pt and helps with dressing changes              Your wound-care supplies will be provided by: [employer]                            XUEYQ cleansing:                           FJ not scrub or use excessive force.                          UKQQ hands with soap and water before and after dressing changes.                           Prior to applying a clean dressing, cleanse wound with normal saline,                          wound cleanser, or mild soap and water.                           Ask your physician or nurse before getting the wound(s) wet in the shower.              Daily Wound management:                          Keep weight off wounds and reposition every 2 hours.                          SXHCG standing for long periods of time.                          KQNYR wraps/stockings in AM and remove at bedtime.                          Elevate legs to the level of the heart or above for 30 minutes 4-5 times a day and/or when sitting.                                               When taking antibiotics take entire prescription as ordered by MD do not stop taking until medicine is all gone.                             Orders for this week: (21)  Left Lateral Foot- wash with mild soap and water. Pat dry with 4x4  Apply rick to wound bed  Cover with foam border gauze  Wrap with Kerlix and secure with tape  Change every day.      Follow up with Dr Suellen Mckinnon in 2 weeks in the wound care center  Call [phone] for any questions or concerns.   Date__________   Time____________  Please call central scheduling to make appointment for vascular studies  Central [phone]        Treatment Note      Written Patient Dismissal Instructions Given            Electronically signed by Shannan Quiros MD on 2021 at 10:40 AM

## 2021-10-05 NOTE — PROGRESS NOTES
Wound Care Center Progress Note With Procedure    Kwame Betancur  AGE: 66 y.o. GENDER: male  : 1945  EPISODE DATE:  10/5/2021     Subjective:     Chief Complaint   Patient presents with    Wound Check     LLE         HISTORY of PRESENT ILLNESS      Kwame Betancur is a 66 y.o. male who presents today for wound evaluation of Chronic arterial, diabetic and pressure ulcer(s) of the left lateral foot and left heel. The ulcer is of mild severity. The underlying cause of the wound is diabetes, pressure and PAD. Wounds of healed and I just need to remove some devitalized tissue from the left heel.   Wound Pain Timing/Severity: mild  Quality of pain: tender, pressure  Severity of pain:  3 / 10   Modifying Factors: diabetes, chronic pressure, decreased mobility and arterial insufficiency  Associated Signs/Symptoms: edema        PAST MEDICAL HISTORY        Diagnosis Date    Anxiety     BPH (benign prostatic hyperplasia)     CAD (coronary artery disease)     Constipation     Cyst     liver and pancreas    Debility     Diabetes mellitus ([address]     Diabetic ulcer of left foot associated with diabetes mellitus due to underlying condition, with fat layer exposed ([address] 2021    Diabetic ulcer of left foot associated with diabetes mellitus due to underlying condition, with muscle involvement without evidence of necrosis ([address] 2021    Dysphagia     GERD (gastroesophageal reflux disease)     Hyperlipidemia     Hypertension     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 No pertinent family history. SOCIAL HISTORY    Social History     Tobacco Use    Smoking status: Former Smoker     Quit date: 1997     Years since quittin.4    Smokeless tobacco: Never Used   Substance Use Topics    Alcohol use: No    Drug use: No       ALLERGIES    Allergies   Allergen Reactions    Calmoseptine [Menthol-Zinc Oxide]     Lyrica [Pregabalin] Other (See Comments)     Eyes cross    Pradaxa [Dabigatran Etexilate Mesylate] Other (See Comments)     Headache         MEDICATIONS    Current Outpatient Medications on File Prior to Encounter   Medication Sig Dispense Refill    sulfamethoxazole-trimethoprim (BACTRIM;SEPTRA) 200-40 MG/5ML suspension Take 160 mg by mouth 2 times daily      oxyCODONE-acetaminophen (PERCOCET) 5-325 MG per tablet Take 1 tablet by mouth every 4 hours as needed for Pain.  pantoprazole (PROTONIX) 40 MG tablet Take 40 mg by mouth daily      tamsulosin (FLOMAX) 0.4 MG capsule Take 0.4 mg by mouth daily      vitamin B-6 (PYRIDOXINE) 100 MG tablet Take 100 mg by mouth daily      vitamin B-12 (CYANOCOBALAMIN) 1000 MCG tablet Take 1,000 mcg by mouth daily      guaiFENesin (MUCINEX) 600 MG SR tablet Take 1 tablet by mouth 2 times daily      nystatin (MYCOSTATIN) 552023 UNIT/GM powder Apply topically 4 times daily. 1 Bottle 1    oxyCODONE-acetaminophen (PERCOCET)  MG per tablet Take 1 tablet by mouth every 6 hours as needed for Pain      pravastatin (PRAVACHOL) 40 MG tablet Take 40 mg by mouth daily.  polyethylene glycol (MIRALAX) powder Take 17 g by mouth daily.  insulin glargine (LANTUS) 100 UNIT/ML injection vial Inject  into the skin nightly. Sliding scale -   If accucheck >200 = 30 units  If accucheck 101-200 = 20 units  Below 100 - NO Lantus      insulin aspart (NOVOLOG) 100 UNIT/ML injection vial Inject 18 Units into the skin 3 times daily (before meals).  Plus sliding scale, with 18 units base      docusate sodium 100 MG CAPS Take 100 mg by mouth 2 times daily as needed for Constipation. (Patient taking differently: Take 100 mg by mouth daily.)      sertraline (ZOLOFT) 25 MG tablet Take 2 tablets by mouth daily. (Patient taking differently: Take 100 mg by mouth daily )      trazodone (DESYREL) 50 MG tablet Take 1 tablet by mouth nightly as needed for Sleep. (Patient taking differently: Take 75 mg by mouth nightly as needed for Sleep.)       No current facility-administered medications on file prior to encounter. REVIEW OF SYSTEMS    Pertinent items are noted in HPI. Constitutional: Negative for systemic symptoms including fever, chills and malaise. Objective:      /65   Pulse 105   Temp 98.4 °F (36.9 °C) (Temporal)   Resp 16     PHYSICAL EXAM      General: The patient is in no acute distress. Mental status:  Patient is appropriate, is  oriented to place and plan of care. Dermatologic exam: Visual inspection of the periwound reveals the skin to be normal in turgor and texture  Wound exam: see wound description below in procedure note      Assessment:     Problem List Items Addressed This Visit     Diabetic ulcer of left foot associated with diabetes mellitus due to underlying condition, with fat layer exposed ([address] - Primary    Relevant Medications    lidocaine (XYLOCAINE) 4 % external solution    Other Relevant Orders    Initiate Outpatient Wound Care Protocol        Procedure Note    Indications:  Based on my examination of this patient's wound(s) today, sharp excision into necrotic epidermis is required to promote healing and evaluate the extent of previous healing. Performed by: Elvira Darling MD    Consent obtained: Yes    Time out taken:  Yes    Pain Control: Anesthetic  Anesthetic: 5% Lidocaine Ointment Topical     Debridement:Excisional Debridement    Using scissors and forceps the wound(s) was/were sharply debrided down through and including the removal of epidermis. Devitalized Tissue Debrided:  slough    Pre Debridement Measurements:  Are located in the Wound Documentation Flow Sheet    All active wounds listed below with today's date are evaluated  Wound(s)    debrided this date include # : 1     Post  Debridement Measurements:  Wound 10/13/17 Left flank fold (Active)   Number of days: 1452       Wound 10/13/17 Left ischium (Active)   Number of days: 1452       Wound 10/13/17 Right buttock (Active)   Number of days: 1452       Wound 07/13/21 Foot #1 Left Lateral Foot (Active)   Wound Image   09/14/21 1028   Wound Etiology Diabetic 07/13/21 1101   Dressing Status New dressing applied 09/14/21 1106   Wound Cleansed Wound cleanser 10/05/21 1118   Dressing/Treatment Silver dressing; Foam 08/10/21 1117   Offloading for Diabetic Foot Ulcers Other (comment) 10/05/21 1118   Wound Length (cm) 0 cm 10/05/21 1118   Wound Width (cm) 0 cm 10/05/21 1118   Wound Depth (cm) 0 cm 10/05/21 1118   Wound Surface Area (cm^2) 0 cm^2 10/05/21 1118   Change in Wound Size % (l*w) 100 10/05/21 1118   Wound Volume (cm^3) 0 cm^3 10/05/21 1118   Wound Healing % 100 10/05/21 1118   Post-Procedure Length (cm) 0.8 cm 09/14/21 1050   Post-Procedure Width (cm) 0.8 cm 09/14/21 1050   Post-Procedure Depth (cm) 0.1 cm 09/14/21 1050   Post-Procedure Surface Area (cm^2) 0.64 cm^2 09/14/21 1050   Post-Procedure Volume (cm^3) 0.064 cm^3 09/14/21 1050   Distance Tunneling (cm) 0 cm 10/05/21 1118   Tunneling Position ___ O'Clock 0 10/05/21 1118   Undermining Starts ___ O'Clock 0 10/05/21 1118   Undermining Ends___ O'Clock 0 10/05/21 1118   Undermining Maxium Distance (cm) 0 10/05/21 1118   Wound Assessment Eschar dry 10/05/21 1118   Drainage Amount None 10/05/21 1118   Drainage Description Yellow 09/14/21 1028   Odor None 10/05/21 1118   Joann-wound Assessment Blanchable erythema 10/05/21 1118   Margins Attached edges 10/05/21 1118   Wound Thickness Description not for Pressure Injury Partial thickness 10/05/21 1118 Number of days: 84       Percent of Wound(s) Debrided: approximately 100%    Total  Area  Debrided:  1 sq cm     Bleeding:  None    Hemostasis Achieved:  not needed    Procedural Pain:  0  / 10     Post Procedural Pain:  0 / 10     Response to treatment:  Well tolerated by patient. Status of wound progress and description from last visit:   Completely healed. Plan:       Discharge Instructions       PHYSICIAN ORDERS AND DISCHARGE INSTRUCTIONS     NOTE: Upon discharge from the [address], you will receive a patient experience survey. We would be grateful if you would take the time to fill this survey out.     Wound care order history:                 ROSA's   Right       Left    Date               Vascular studies:                 Cultures:  7/13/21              Antibiotics: Ethlyn Fish              HbA1c:                 Compression/Lymph Pumps:              Grafts:                  Continuing wound care orders and information:              Residence: Private              Continue home health care with: [facility] once a week. Daughter (nurse) also lives with pt and helps with dressing changes              Your wound-care supplies will be provided by: [facility] cleansing:                           KF not scrub or use excessive force.                          MQKE hands with soap and water before and after dressing changes.                           Prior to applying a clean dressing, cleanse wound with normal saline,                          wound cleanser, or mild soap and water.                           Ask your physician or nurse before getting the wound(s) wet in the shower.              Daily Wound management:                          Keep weight off wounds and reposition every 2 hours.                          Avoid standing for long periods of time.                          BWHSD wraps/stockings in AM and remove at bedtime.                          Elevate legs to the level of the heart or above for 30 minutes 4-5 times a day and/or when sitting.                                               When taking antibiotics take entire prescription as ordered by MD do not stop taking until medicine is all gone.                             Orders for this week: (10/5/21)  Left Lateral Foot- wash with mild soap and water. Pat dry with 4x4  Cover with foam border gauze  Wrap with Kerlix and secure with tape  Change every other day.       Follow up with Dr Zainab Campos in 2 weeks in the wound care center  Call [phone] for any questions or concerns.   Date__________   Time____________        Treatment Note      Written Patient Dismissal Instructions Given            Electronically signed by Ivania Dodson MD on 10/5/2021 at 11:41 AM

## 2021-10-19 NOTE — PROGRESS NOTES
Wound Care Center Progress Note With Procedure    Filiberto Royal  AGE: 66 y.o. GENDER: male  : 1945  EPISODE DATE:  10/19/2021     Subjective:     Chief Complaint   Patient presents with    Wound Check     LLE          HISTORY of PRESENT ILLNESS      Filiberto Royal is a 66 y.o. male who presents today for wound evaluation of Chronic arterial, diabetic and pressure ulcer(s) of the left lateral foot and heel. The ulcer is of mild severity. The underlying cause of the wound is diabetes and pressure. Improving. Wound Pain Timing/Severity: mild  Quality of pain: tender, tingling  Severity of pain:  3 / 10   Modifying Factors: diabetes, chronic pressure, shear force and arterial insufficiency  Associated Signs/Symptoms: drainage        PAST MEDICAL HISTORY        Diagnosis Date    Anxiety     BPH (benign prostatic hyperplasia)     CAD (coronary artery disease)     Constipation     Cyst     liver and pancreas    Debility     Diabetes mellitus ([address]     Diabetic ulcer of left foot associated with diabetes mellitus due to underlying condition, with fat layer exposed ([address] 2021    Diabetic ulcer of left foot associated with diabetes mellitus due to underlying condition, with muscle involvement without evidence of necrosis ([address] 2021    Dysphagia     GERD (gastroesophageal reflux disease)     Hyperlipidemia     Hypertension     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     SOCIAL HISTORY    Social History     Tobacco Use    Smoking status: Former Smoker     Quit date: 1997     Years since quittin.4    Smokeless tobacco: Never Used   Substance Use Topics    Alcohol use: No    Drug use: No       ALLERGIES    Allergies   Allergen Reactions    Calmoseptine [Menthol-Zinc Oxide]     Lyrica [Pregabalin] Other (See Comments)     Eyes cross    Pradaxa [Dabigatran Etexilate Mesylate] Other (See Comments)     Headache         MEDICATIONS    Current Outpatient Medications on File Prior to Encounter   Medication Sig Dispense Refill    sulfamethoxazole-trimethoprim (BACTRIM;SEPTRA) 200-40 MG/5ML suspension Take 160 mg by mouth 2 times daily      oxyCODONE-acetaminophen (PERCOCET) 5-325 MG per tablet Take 1 tablet by mouth every 4 hours as needed for Pain.  pantoprazole (PROTONIX) 40 MG tablet Take 40 mg by mouth daily      tamsulosin (FLOMAX) 0.4 MG capsule Take 0.4 mg by mouth daily      vitamin B-6 (PYRIDOXINE) 100 MG tablet Take 100 mg by mouth daily      vitamin B-12 (CYANOCOBALAMIN) 1000 MCG tablet Take 1,000 mcg by mouth daily      guaiFENesin (MUCINEX) 600 MG SR tablet Take 1 tablet by mouth 2 times daily      nystatin (MYCOSTATIN) 785418 UNIT/GM powder Apply topically 4 times daily. 1 Bottle 1    oxyCODONE-acetaminophen (PERCOCET)  MG per tablet Take 1 tablet by mouth every 6 hours as needed for Pain      pravastatin (PRAVACHOL) 40 MG tablet Take 40 mg by mouth daily.  polyethylene glycol (MIRALAX) powder Take 17 g by mouth daily.  insulin glargine (LANTUS) 100 UNIT/ML injection vial Inject  into the skin nightly. Sliding scale -   If accucheck >200 = 30 units  If accucheck 101-200 = 20 units  Below 100 - NO Lantus      insulin aspart (NOVOLOG) 100 UNIT/ML injection vial Inject 18 Units into the skin 3 times daily (before meals). Plus sliding scale, with 18 units base      docusate sodium 100 MG CAPS Take 100 mg by mouth 2 times daily as needed for Constipation.  (Patient taking differently: Take 100 mg by mouth daily.)      sertraline (ZOLOFT) 25 MG tablet Take 2 tablets by mouth daily. (Patient taking differently: Take 100 mg by mouth daily )      trazodone (DESYREL) 50 MG tablet Take 1 tablet by mouth nightly as needed for Sleep. (Patient taking differently: Take 75 mg by mouth nightly as needed for Sleep.)       No current facility-administered medications on file prior to encounter. REVIEW OF SYSTEMS    Pertinent items are noted in HPI. Constitutional: Negative for systemic symptoms including fever, chills and malaise. Objective: There were no vitals taken for this visit. PHYSICAL EXAM      General: The patient is in no acute distress. Mental status:  Patient is appropriate, is  oriented to place and plan of care. Dermatologic exam: Visual inspection of the periwound reveals the skin to be cool   Wound exam: see wound description below in procedure note      Assessment:     Problem List Items Addressed This Visit     WD-Osteomyelitis of great toe of left foot (HCC)    Relevant Medications    lidocaine (XYLOCAINE) 5 % ointment    Other Relevant Orders    Initiate Outpatient Wound Care Protocol    Diabetic ulcer of left foot associated with diabetes mellitus due to underlying condition, with fat layer exposed ([address] - Primary    Relevant Medications    lidocaine (XYLOCAINE) 5 % ointment    Other Relevant Orders    Initiate Outpatient Wound Care Protocol        Procedure Note    Indications:  Based on my examination of this patient's wound(s) today, sharp excision into necrotic subcutaneous tissue is required to promote healing and evaluate the extent of previous healing.     Performed by: Mila Reaves MD    Consent obtained: Yes    Time out taken:  Yes    Pain Control: Anesthetic  Anesthetic: 5% Lidocaine Ointment Topical     Debridement:Excisional Debridement    Using scissors and forceps the wound(s) was/were sharply debrided down through and including the removal of subcutaneous tissue. Devitalized Tissue Debrided:  slough and exudate    Pre Debridement Measurements:  Are located in the Wound Documentation Flow Sheet    All active wounds listed below with today's date are evaluated  Wound(s)    debrided this date include # : 1     Post  Debridement Measurements:  Wound 10/13/17 Left flank fold (Active)   Number of days: 1466       Wound 10/13/17 Left ischium (Active)   Number of days: 1466       Wound 10/13/17 Right buttock (Active)   Number of days: 1466       Wound 07/13/21 Foot #1 Left Lateral Foot (Active)   Wound Image    10/19/21 1021   Wound Etiology Diabetic 07/13/21 1101   Dressing Status New dressing applied;Clean;Dry; Intact 10/05/21 1145   Wound Cleansed Wound cleanser;Cleansed with saline 10/19/21 1021   Dressing/Treatment Silver dressing; Foam 08/10/21 1117   Offloading for Diabetic Foot Ulcers Other (comment) 10/05/21 1118   Wound Length (cm) 1.5 cm 10/19/21 1021   Wound Width (cm) 1 cm 10/19/21 1021   Wound Depth (cm) 0.1 cm 10/19/21 1021   Wound Surface Area (cm^2) 1.5 cm^2 10/19/21 1021   Change in Wound Size % (l*w) -206.12 10/19/21 1021   Wound Volume (cm^3) 0.15 cm^3 10/19/21 1021   Wound Healing % -206 10/19/21 1021   Post-Procedure Length (cm) 1.5 cm 10/19/21 1041   Post-Procedure Width (cm) 1 cm 10/19/21 1041   Post-Procedure Depth (cm) 0.1 cm 10/19/21 1041   Post-Procedure Surface Area (cm^2) 1.5 cm^2 10/19/21 1041   Post-Procedure Volume (cm^3) 0.15 cm^3 10/19/21 1041   Distance Tunneling (cm) 0 cm 10/19/21 1021   Tunneling Position ___ O'Clock 0 10/19/21 1021   Undermining Starts ___ O'Clock 0 10/19/21 1021   Undermining Ends___ O'Clock 0 10/19/21 1021   Undermining Maxium Distance (cm) 0 10/19/21 1021   Wound Assessment Dry;Slough 10/19/21 1021   Drainage Amount None 10/19/21 1021   Drainage Description Yellow 09/14/21 1028   Odor None 10/05/21 1118   Joann-wound Assessment Blanchable erythema;Fragile;Dry/flaky 10/19/21 1021   Margins Attached edges 10/19/21 1021   Wound Thickness Description not for Pressure Injury Partial thickness 10/05/21 1118   Number of days: 98       Percent of Wound(s) Debrided: approximately 100%    Total  Area  Debrided:  1.5 sq cm     Bleeding:  None    Hemostasis Achieved:  not needed    Procedural Pain:  3  / 10     Post Procedural Pain:  0 / 10     Response to treatment:  Well tolerated by patient. Status of wound progress and description from last visit:   Improving. Plan:       Discharge Instructions         PHYSICIAN ORDERS AND DISCHARGE INSTRUCTIONS     NOTE: Upon discharge from the [address], you will receive a patient experience survey. We would be grateful if you would take the time to fill this survey out.     Wound care order history:                 ROSA's   Right       Left    Date               Vascular studies:                 Cultures:  7/13/21              Antibiotics:                  HbA1c:                 Compression/Lymph Pumps:              Grafts:                  Continuing wound care orders and information:              Residence: Private              Continue home health care with: [facility] [place]               Your wound-care supplies will be provided by: [facility]                            UVXIQ cleansing:                           LB not scrub or use excessive force.                          Wash hands with soap and water before and after dressing changes.                           Prior to applying a clean dressing, cleanse wound with normal saline,                          wound cleanser, or mild soap and water.                           Ask your physician or nurse before getting the wound(s) wet in the shower.              Daily Wound management:                          Keep weight off wounds and reposition every 2 hours.                          UAJFJ standing for long periods of time.                          UFAUK wraps/stockings in AM and remove at bedtime.                          Elevate legs to the level of the heart or above for 30 minutes 4-5 times a day and/or when sitting.                                               When taking antibiotics take entire prescription as ordered by MD do not stop taking until medicine is all gone.                             Orders for this week: (10/19/21)  Left Lateral Foot- wash with mild soap and water. Pat dry with 4x4  Apply saline dampened rick to wound bed  Cover with foam border gauze  Wrap with Kerlix and secure with tape  Change every other day.       Follow up with Dr Karina Reese in 2 weeks in the wound care center  Call [phone] for any questions or concerns.   Date__________   Time____________             Treatment Note      Written Patient Dismissal Instructions Given            Electronically signed by Stephy White MD on 10/19/2021 at 10:50 AM

## 2021-10-19 NOTE — PLAN OF CARE
Problem: Wound:  Goal: Will show signs of wound healing; wound closure and no evidence of infection  Description: Will show signs of wound healing; wound closure and no evidence of infection  Outcome: Ongoing     Problem: Wound:  Goal: Will show signs of wound healing; wound closure and no evidence of infection  Description: Will show signs of wound healing; wound closure and no evidence of infection  Outcome: Ongoing     Problem: Wound:  Intervention: Assess pain status  Note: See flowsheet  Intervention: Assess wound size, appearance and drainage  Note: See flowsheet  Intervention: Assess pedal pulses bilaterally if patient has a foot or leg ulcer  Note: See flowsheet

## 2021-11-10 PROBLEM — U07.1 COVID-19: Status: ACTIVE | Noted: 2021-01-01

## 2021-11-10 NOTE — ED PROVIDER NOTES
EMERGENCY DEPARTMENT ENCOUNTER    Patient: Niall Hager  MRN: [id number]  : 1945  Date of Evaluation: 2021  ED Provider:  Pepe Almodovar MD    CHIEF COMPLAINT  Chief Complaint   Patient presents with    Positive For Covid-19     SOB, sxmfor 1 weeks, + home test yest, 77% on scene on [facility] upon arrival. Home 02 at 3Lpm       HPI  Niall Hager is a 66 y.o. male with shortness of breath, generalized body aches and chills and nonproductive cough for the last 4 to 5 days. Took at home Covid test 2 days ago which was positive. Had worsening shortness of breath today. Here in the emergency department, the patient does seem confused but is able to tell me his name but does not answer any questions. I have spoken with his wife, Neeru Castellanos, who has provided much of the history. 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 She seems to think that he is at baseline mental status. Of note, the patient has not been vaccinated for COVID-19.       REVIEW OF SYSTEMS  Unable to obtain full review of systems secondary to patient's altered mental status    PAST MEDICAL HISTORY  Past Medical History:   Diagnosis Date    Anxiety     BPH (benign prostatic hyperplasia)     CAD (coronary artery disease)     Constipation     Cyst     liver and pancreas    Debility     Diabetes mellitus ([address]     Diabetic ulcer of left foot associated with diabetes mellitus due to underlying condition, with fat layer exposed ([address] 2021    Diabetic ulcer of left foot associated with diabetes mellitus due to underlying condition, with muscle involvement without evidence of necrosis ([address] 2021    Dysphagia     GERD (gastroesophageal reflux disease)     Hyperlipidemia     Hypertension     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     less feeling in left lower extremity    Unspecified cerebral artery occlusion with cerebral infarction 2009       CURRENT MEDICATIONS  [unfilled]    ALLERGIES  Allergies   Allergen Reactions    Calmoseptine [Menthol-Zinc Oxide]     Lyrica [Pregabalin] Other (See Comments)     Eyes cross    Pradaxa [Dabigatran Etexilate Mesylate] Other (See Comments)     Headache         SURGICAL HISTORY  Past Surgical History:   Procedure Laterality Date    CARDIAC SURGERY      CHOLECYSTECTOMY, LAPAROSCOPIC  03/31/15    CORONARY ARTERY BYPASS GRAFT      quad     INNER EAR SURGERY      cyst removal and reconstruction    LEG SURGERY         FAMILY HISTORY  History reviewed. No pertinent family history. SOCIAL HISTORY  Social History     Socioeconomic History    Marital status:      Spouse name: None    Number of children: None    Years of education: None    Highest education level: None   Occupational History    None   Tobacco Use    Smoking status: Former Smoker     Quit date: 1997     Years since quittin.5    Smokeless tobacco: Never Used   Substance and Sexual Activity    Alcohol use: No    Drug use: No    Sexual activity: None   Other Topics Concern    None   Social History Narrative    None     Social Determinants of Health     Financial Resource Strain:     Difficulty of Paying Living Expenses: Not on file   Food Insecurity:     Worried About Running Out of Food in the Last Year: Not on file    Kathleen of Food in the Last Year: Not on file   Transportation Needs:     Lack of Transportation (Medical): Not on file    Lack of Transportation (Non-Medical):  Not on file   Physical Activity:     Days of Exercise per Week: Not on file    Minutes of Exercise per Session: Not on file   Stress:     Feeling of Stress : Not on file   Social Connections:     Frequency of Communication with Friends and Family: Not on file    Frequency of Social Gatherings with Friends and Family: Not on file    Attends Worship Services: Not on file   [facility] Active Member of Clubs or Organizations: Not on file    Attends Club or Organization Meetings: Not on file    Marital Status: Not on file   Intimate Partner Violence:     Fear of Current or Ex-Partner: Not on file    Emotionally Abused: Not on file    Physically Abused: Not on file    Sexually Abused: Not on file   Housing Stability:     Unable to Pay for Housing in the Last Year: Not on file    Number of [place] in the Last Year: Not on file    Unstable Housing in the Last Year: Not on file         **Past medical, family and social histories, and nursing notes reviewed and verified by me**      PHYSICAL EXAM  VITAL SIGNS:   ED Triage Vitals [11/10/21 1552]   Enc Vitals Group      BP (!) 127/53      Pulse 109      Resp 26      Temp 98.3 °F (36.8 °C)      Temp Source Oral      SpO2 90 %      Weight       Height       Head Circumference       Peak Flow       Pain Score       Pain Loc       Pain Edu? Excl. in [address]? Vitals during ED course were reviewed and are as charted. Constitutional: Minimal distress, Non-toxic appearance  Eyes: Conjunctiva normal, No discharge, PERRL, EOMI  HENT: Normocephalic, Atraumatic, posterior oropharynx is nonerythematous and without exudate, uvula is midline, oropharynx moist  Neck: Supple, no stridor, no grossly visible or palpable masses  Cardiovascular: Tachycardic with irregularly irregular rhythm, No murmurs, No rubs, No gallops, no JVD  Pulmonary/Chest: Tachypneic with coarse breath sounds bilaterally  Abdomen: Soft, nondistended and nonrigid, No tenderness or peritoneal signs, No masses, normal bowel sounds  Back: No midline point tenderness, No paraspinous muscle tenderness.  No CVA tenderness  Extremities: No gross deformities, no edema, no tenderness  Neurologic: Left-sided hemiparalysis  Skin: Warm, Dry, No erythema, No rash, No cyanosis, No mottling  Lymphatic: No lymphadenopathy in the following location(s): cervical  Psychiatric: Alert and oriented x3, Affect normal          EKG Interpretation    Interpreted by emergency department physician    Rhythm: atrial fibrillation - rapid  Rate: normal  Axis: left  Ectopy: premature ventricular contractions (frequent)  Conduction: right bundle branch block (complete)  ST Segments: normal  T Waves: normal  Q Waves: none    Clinical Impression: Atrial fibrillation with RVR with right bundle branch block and PVCs        RADIOLOGY/PROCEDURES/LABS/MEDICATIONS ADMINISTERED:    I have reviewed and interpreted all of the currently available lab results from this visit (if applicable):  Results for orders placed or performed during the hospital encounter of 11/10/21   CBC Auto Differential   Result Value Ref Range    WBC 15.0 (H) 4.0 - 10.5 K/CU MM    RBC 4.73 4.6 - 6.2 M/CU MM    Hemoglobin 14.5 13.5 - 18.0 GM/DL    Hematocrit 43.9 42 - 52 %    MCV 92.8 78 - 100 FL    MCH 30.7 27 - 31 PG    MCHC 33.0 32.0 - 36.0 %    RDW 13.9 11.7 - 14.9 %    Platelets 451 681 - 879 K/CU MM    MPV 11.6 (H) 7.5 - 11.1 FL    Differential Type AUTOMATED DIFFERENTIAL     Segs Relative 82.0 (H) 36 - 66 %    Lymphocytes % 9.6 (L) 24 - 44 %    Monocytes % 7.5 (H) 0 - 4 %    Eosinophils % 0.0 0 - 3 %    Basophils % 0.2 0 - 1 %    Segs Absolute 12.3 K/CU MM    Lymphocytes Absolute 1.4 K/CU MM    Monocytes Absolute 1.1 K/CU MM    Eosinophils Absolute 0.0 K/CU MM    Basophils Absolute 0.0 K/CU MM    Nucleated RBC % 0.3 %    Total Nucleated RBC 0.1 K/CU MM    Total Immature Neutrophil 0.11 K/CU MM    Immature Neutrophil % 0.7 (H) 0 - 0.43 %   Comprehensive Metabolic Panel w/ Reflex to MG   Result Value Ref Range    Sodium 132 (L) 135 - 145 MMOL/L    Potassium 2.7 (LL) 3.5 - 5.1 MMOL/L    Chloride 94 (L) 99 - 110 mMol/L    CO2 23 21 - 32 MMOL/L    BUN 27 (H) 6 - 23 MG/DL    CREATININE 0.6 (L) 0.9 - 1.3 MG/DL    Glucose 431 (HH) 70 - 99 MG/DL    Calcium 8.6 8.3 - 10.6 MG/DL    Albumin 2.4 (L) 3.4 - 5.0 GM/DL    Total Protein 6.5 6.4 - 8.2 GM/DL    Total Bilirubin 0.6 0.0 - 1.0 MG/DL    ALT 10 10 - 40 U/L    AST 23 15 - 37 IU/L    Alkaline Phosphatase 73 40 - 128 IU/L    GFR Non-African American >60 >60 mL/min/1.73m2    GFR African American >60 >60 mL/min/1.73m2    Anion Gap 15 4 - 16   Troponin   Result Value Ref Range    Troponin T <0.010 <0.01 NG/ML   Brain Natriuretic Peptide   Result Value Ref Range    Pro-BNP 5,357 (H) <300 PG/ML   Blood Gas, Venous   Result Value Ref Range    pH, [place] 7.35 7.32 - 7.42    pCO2, [place] 43 38 - 52 mmHG    pO2, [place] 81 (H) 28 - 48 mmHG    Base Excess 2 0 - 3.3    HCO3, Venous 23.7 19 - 25 MMOL/L    O2 Sat, [place] 93.3 (H) 50 - 70 %    Comment VBG    Procalcitonin   Result Value Ref Range    Procalcitonin 1.26    Magnesium   Result Value Ref Range    Magnesium 1.8 1.8 - 2.4 mg/dl   EKG 12 Lead   Result Value Ref Range    Ventricular Rate 107 BPM    Atrial Rate 74 BPM    QRS Duration 156 ms    Q-T Interval 420 ms    QTc Calculation (Bazett) 560 ms    R Axis -31 degrees    T Axis 43 degrees    Diagnosis       Atrial fibrillation with rapid ventricular response with premature ventricular or aberrantly conducted complexes  Left axis deviation  Right bundle branch block  Inferior infarct , age undetermined  Anterior infarct (cited on or before 13-OCT-2017)  Abnormal ECG  When compared with ECG of 13-OCT-2017 01:42,  Atrial fibrillation has replaced Sinus rhythm  Right bundle branch block is now present  Questionable change in initial forces of Anterolateral leads            ABNORMAL LABS:  Labs Reviewed   CBC WITH AUTO DIFFERENTIAL - Abnormal; Notable for the following components:       Result Value    WBC 15.0 (*)     MPV 11.6 (*)     Segs Relative 82.0 (*)     Lymphocytes % 9.6 (*)     Monocytes % 7.5 (*)     Immature Neutrophil % 0.7 (*)     All other components within normal limits   COMPREHENSIVE METABOLIC PANEL W/ REFLEX TO MG FOR LOW K - Abnormal; Notable for the following components:    Sodium 132 (*)     Potassium 2.7 (*)     Chloride 94 (*)     BUN 27 (*)     CREATININE 0.6 (*)     Glucose 431 (*)     Albumin 2.4 (*)     All other components within normal limits   BRAIN NATRIURETIC PEPTIDE - Abnormal; Notable for the following components:    Pro-BNP 5,357 (*)     All other components within normal limits   BLOOD GAS, VENOUS - Abnormal; Notable for the following components:    pO2, [place] 81 (*)     O2 Sat, [place] 93.3 (*)     All other components within normal limits   LEGIONELLA ANTIGEN, URINE   STREP PNEUMONIAE ANTIGEN   CULTURE, RESPIRATORY   TROPONIN   PROCALCITONIN   MAGNESIUM   PROTIME/INR & PTT   CBC WITH AUTO DIFFERENTIAL   COMPREHENSIVE METABOLIC PANEL W/ REFLEX TO MG FOR LOW K   PROTIME-INR   APTT   FIBRINOGEN   PROCALCITONIN   C-REACTIVE PROTEIN   LACTATE DEHYDROGENASE   FERRITIN   D-DIMER, QUANTITATIVE   TROPONIN   LACTIC ACID, PLASMA   VITAMIN D 25 HYDROXY   TROPONIN   T4, FREE   TSH WITHOUT REFLEX   POCT GLUCOSE   POCT GLUCOSE         IMAGING STUDIES ORDERED:  XR CHEST PORTABLE  CT HEAD WO CONTRAST  IP CONSULT TO CARDIOLOGY  IP CONSULT TO HOSPITALIST  IP CONSULT TO PULMONOLOGY  TELEMETRY MONITORING  NEURO CHECKS  VITAL SIGNS  INTAKE AND OUTPUT  NURSING COMMUNICATION  DAILY WEIGHTS  INTAKE AND OUTPUT  INTAKE AND OUTPUT  INTAKE AND OUTPUT  PATIENT STATUS (FROM ED OR OR/PROCEDURAL)  FULL CODE  DROPLET PLUS ISOLATION  ADULT DIET  REASON FOR NO MECHANICAL VTE PROPHYLAXIS  ACTIVITY AS TOLERATED  HYPOGLYCEMIA TREATMENT: BG LESS THAN 50 MG/DL AND PATIENT ALERT  HYPOGLYCEMIA TREATMENT: BG LESS THAN 70 MG/DL AND PATIENT ALERT  HYPOGLYCEMIA TREATMENT: BG LESS THAN 70 MG/DL AND PATIENT NOT ALERT    I have personally viewed the imaging studies. The radiologist interpretation is:   CT HEAD WO CONTRAST   Final Result   No acute intracranial abnormality. Large old right MCA infarct severe chronic microvascular disease within the   periventricular         XR CHEST PORTABLE   Final Result   1.  Multifocal bilateral pneumonia in this patient with given history of   COVID-19.                MEDICATIONS ADMINISTERED:  Medications   potassium chloride 10 mEq/100 mL IVPB (Peripheral Line) ( IntraVENous Canceled Entry 11/10/21 2035)   amiodarone (CORDARONE) 450 mg in dextrose 5 % 250 mL infusion (1 mg/min IntraVENous Rate/Dose Verify 11/10/21 2356)     Followed by   amiodarone (CORDARONE) 450 mg in dextrose 5 % 250 mL infusion (has no administration in time range)   guaiFENesin (MUCINEX) extended release tablet 600 mg (has no administration in time range)   insulin lispro (HUMALOG) injection vial 5 Units (has no administration in time range)   insulin glargine (LANTUS) injection vial 15 Units (has no administration in time range)   pantoprazole (PROTONIX) tablet 40 mg (has no administration in time range)   pravastatin (PRAVACHOL) tablet 40 mg (has no administration in time range)   sertraline (ZOLOFT) tablet 100 mg (has no administration in time range)   tamsulosin (FLOMAX) capsule 0.4 mg (has no administration in time range)   sodium chloride flush 0.9 % injection 5-40 mL (has no administration in time range)   sodium chloride flush 0.9 % injection 5-40 mL (has no administration in time range)   0.9 % sodium chloride infusion (has no administration in time range)   enoxaparin (LOVENOX) injection 30 mg (has no administration in time range)   ondansetron (ZOFRAN-ODT) disintegrating tablet 4 mg (has no administration in time range)     Or   ondansetron (ZOFRAN) injection 4 mg (has no administration in time range)   polyethylene glycol (GLYCOLAX) packet 17 g (has no administration in time range)   acetaminophen (TYLENOL) tablet 650 mg (has no administration in time range)     Or   acetaminophen (TYLENOL) suppository 650 mg (has no administration in time range)   dexamethasone (DECADRON) tablet 6 mg (has no administration in time range)   potassium chloride (KLOR-CON M) extended release tablet 40 mEq (has no administration in time range)     Or   potassium bicarb-citric patient on empiric antibiotics for possible secondary bacterial pneumonia. He is also been noted to be in atrial fibrillation with RVR however his rate has been around 110 most of the time here in the emergency department and I therefore did not immediately initiate any treatment for this. He has been noted to be hypokalemic with potassium 2.8. Magnesium level was within normal limits. I have given him multiple boluses of IV potassium as well as a gram of magnesium. Patient, however, began to have frequent PVCs while here in the ER. At 1 point he was noted to have a run of unsustained V. tach for about 20 beats. I did start the patient on amiodarone and I discussed case with his cardiologist, Dr. Anshu Small, who is in agreement with this. Differential diagnoses considered included, but were not limited to, acute bronchospasm, allergic reaction/anaphylaxis, acute respiratory infection, pneumonia, acute coronary syndrome, dysrhythmia, congestive heart failure, non-CHF-related pulmonary edema, pneumothorax, pulmonary embolism, and anemia. I have spoken with the patient's wife, Li, as well as the patient's daughter. They have opted to make the patient's CODE STATUS DNR CCA, after I carefully explained the CODE STATUS options. They also do not wish to have the patient intubated/mechanically ventilated. Additional workup and treatment in the ED as documented above. Pt will be admitted for further evaluation and treatment. I discussed the case with the hospitalist, who agreed to admit the patient. Plan discussed with pt and/or family who expressed understanding and agreement with plan. Admitted in stable condition.     My critical care involvement with this patient (excluding any separately billable procedures) took 46 minutes and included the following elements:    [x] Response to abnormal vitals  [x] Review and response to abnormal lab tests  [x] Review of old records/labs  [x] Discussion with family/EMS/other pre-hospital personnel  [x] Discussion with consultants/hospitalist  [x] Rechecking patient/patient response to interventions  [x] Rapid intervention to prevent deterioration  [x] Discussing with patient/family treatment options  [x] Overseeing final disposition      Clinical Impression:  1. Acute respiratory failure with hypoxia ([facility] [address]    2. Atrial fibrillation with RVR ([facility] [facility]    3. COVID-19        Disposition referral (if applicable):  No follow-up provider specified. Disposition medications (if applicable):  Current Discharge Medication List          ED Provider Disposition Time  DISPOSITION            Electronically signed by: Les Alexandra M.D., 11/11/2021 1:02 AM      This dictation was created with voice recognition software. While attempts have been made to review the dictation as it is transcribed, on occasion the spoken word can be misinterpreted by the technology leading to omissions or inappropriate words, phrases or sentences.         Shayy Beatty MD  11/11/21 2931

## 2021-11-11 NOTE — H&P
HISTORY AND PHYSICAL  (Hospitalist, Internal Medicine)  IDENTIFYING INFORMATION   PATIENT:  Yoseph Fuller  MRN:  [id number]  ADMIT DATE: 11/10/2021  TIME OF EVALUATION: 11/10/2021 10:54 PM    CHIEF COMPLAINT     Nonverbal however noted to be short of breath, and hypoxic on home pulse ox  HISTORY OF PRESENT ILLNESS   Yoseph Fuller is a 66 y.o. male admitted for COVID-19 pneumonia, +2 days ago, at worsening shortness of breath, now found to be in A. fib RVR, family at bedside to provide history, however history obtained from report. Patient has left-sided paralysis, is proving confused, however per family thinks that he is at baseline at this time. Patient is nonverbal    PMH listed below:    PAST MEDICAL, SURGICAL, FAMILY, and SOCIAL HISTORY     Past Medical History:   Diagnosis Date    Anxiety     BPH (benign prostatic hyperplasia)     CAD (coronary artery disease)     Constipation     Cyst     liver and pancreas    Debility     Diabetes mellitus ([address]     Diabetic ulcer of left foot associated with diabetes mellitus due to underlying condition, with fat layer exposed ([address] 7/13/2021    Diabetic ulcer of left foot associated with diabetes mellitus due to underlying condition, with muscle involvement without evidence of necrosis ([address] 7/13/2021    Dysphagia     GERD (gastroesophageal reflux disease)     Hyperlipidemia     Hypertension     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 No pertinent family history.   Family Hx of HTN  Family Hx as reviewed above, otherwise non-contributory  Social History Socioeconomic History    Marital status:      Spouse name: None    Number of children: None    Years of education: None    Highest education level: None   Occupational History    None   Tobacco Use    Smoking status: Former Smoker     Quit date: 1997     Years since quittin.5    Smokeless tobacco: Never Used   Substance and Sexual Activity    Alcohol use: No    Drug use: No    Sexual activity: None   Other Topics Concern    None   Social History Narrative    None     Social Determinants of Health     Financial Resource Strain:     Difficulty of Paying Living Expenses: Not on file   Food Insecurity:     Worried About Running Out of Food in the Last Year: Not on file    Kathleen of Food in the Last Year: Not on file   Transportation Needs:     Lack of Transportation (Medical): Not on file    Lack of Transportation (Non-Medical): Not on file   Physical Activity:     Days of Exercise per Week: Not on file    Minutes of Exercise per Session: Not on file   Stress:     Feeling of Stress : Not on file   Social Connections:     Frequency of Communication with Friends and Family: Not on file    Frequency of Social Gatherings with Friends and Family: Not on file    Attends Worship Services: Not on file    Active Member of [address] [place] [address] [employer] or Organizations: Not on file    Attends Club or Organization Meetings: Not on file    Marital Status: Not on file   Intimate Partner Violence:     Fear of Current or Ex-Partner: Not on file    Emotionally Abused: Not on file    Physically Abused: Not on file    Sexually Abused: Not on file   Housing Stability:     Unable to Pay for Housing in the Last Year: Not on file    Number of [place] in the Last Year: Not on file    Unstable Housing in the Last Year: Not on file       MEDICATIONS   Medications Prior to Admission  Not in a hospital admission.     Current Medications  Current Facility-Administered Medications   Medication Dose Route Frequency Provider Last Rate Last Admin    azithromycin (ZITHROMAX) 500 mg in dextrose 5 % 250 mL IVPB  500 mg IntraVENous Once Any Wick  mL/hr at 11/10/21 2231 500 mg at 11/10/21 2231    potassium chloride 10 mEq/100 mL IVPB (Peripheral Line)  10 mEq IntraVENous Once Any Wick MD        amiodarone (CORDARONE) 450 mg in dextrose 5 % 250 mL infusion  1 mg/min IntraVENous Continuous Yasmany Vazquez MD 33.3 mL/hr at 11/10/21 2237 1 mg/min at 11/10/21 2237    Followed by   Zahraa Estrada ON 11/11/2021] amiodarone (CORDARONE) 450 mg in dextrose 5 % 250 mL infusion  0.5 mg/min IntraVENous Continuous Any Wick MD        potassium chloride (KLOR-CON M) extended release tablet 40 mEq  40 mEq Oral PRN Dung Ricardo Hill MD        Or    potassium bicarb-citric acid (EFFER-K) effervescent tablet 40 mEq  40 mEq Oral PRN Dung Hill MD        Or    potassium chloride 10 mEq/100 mL IVPB (Peripheral Line)  10 mEq IntraVENous PRN Dung Hill MD         Current Outpatient Medications   Medication Sig Dispense Refill    sulfamethoxazole-trimethoprim (BACTRIM;SEPTRA) 200-40 MG/5ML suspension Take 160 mg by mouth 2 times daily      oxyCODONE-acetaminophen (PERCOCET) 5-325 MG per tablet Take 1 tablet by mouth every 4 hours as needed for Pain.  pantoprazole (PROTONIX) 40 MG tablet Take 40 mg by mouth daily      tamsulosin (FLOMAX) 0.4 MG capsule Take 0.4 mg by mouth daily      vitamin B-6 (PYRIDOXINE) 100 MG tablet Take 100 mg by mouth daily      vitamin B-12 (CYANOCOBALAMIN) 1000 MCG tablet Take 1,000 mcg by mouth daily      guaiFENesin (MUCINEX) 600 MG SR tablet Take 1 tablet by mouth 2 times daily      nystatin (MYCOSTATIN) 745561 UNIT/GM powder Apply topically 4 times daily. 1 Bottle 1    oxyCODONE-acetaminophen (PERCOCET)  MG per tablet Take 1 tablet by mouth every 6 hours as needed for Pain      pravastatin (PRAVACHOL) 40 MG tablet Take 40 mg by mouth daily.       polyethylene glycol (MIRALAX) powder Take 17 g by mouth daily.  insulin glargine (LANTUS) 100 UNIT/ML injection vial Inject  into the skin nightly. Sliding scale -   If accucheck >200 = 30 units  If accucheck 101-200 = 20 units  Below 100 - NO Lantus      insulin aspart (NOVOLOG) 100 UNIT/ML injection vial Inject 18 Units into the skin 3 times daily (before meals). Plus sliding scale, with 18 units base      docusate sodium 100 MG CAPS Take 100 mg by mouth 2 times daily as needed for Constipation. (Patient taking differently: Take 100 mg by mouth daily.)      sertraline (ZOLOFT) 25 MG tablet Take 2 tablets by mouth daily. (Patient taking differently: Take 100 mg by mouth daily )      trazodone (DESYREL) 50 MG tablet Take 1 tablet by mouth nightly as needed for Sleep. (Patient taking differently: Take 75 mg by mouth nightly as needed for Sleep.)           Allergies  Allergies   Allergen Reactions    Calmoseptine [Menthol-Zinc Oxide]     Lyrica [Pregabalin] Other (See Comments)     Eyes cross    Pradaxa [Dabigatran Etexilate Mesylate] Other (See Comments)     Headache         REVIEW OF SYSTEMS   Within above limitations. 14 point review of systems reviewed. Pertinent positive or negative as per HPI or otherwise negative per 14 point systems review. PHYSICAL EXAM     Wt Readings from Last 3 Encounters:   07/09/21 205 lb (93 kg)   10/18/17 184 lb 14.4 oz (83.9 kg)   07/12/16 211 lb 12.8 oz (96.1 kg)       Blood pressure 126/64, pulse 115, temperature 98.3 °F (36.8 °C), temperature source Oral, resp. rate 22, SpO2 98 %. General -alert, not oriented  Psych - Appropriate affect/speech. No agitation  Eyes - Eye lids intact. No scleral icterus  ENT - Lips wnl. External ear clear/dry/intact. No thyromegaly on inspection  Neuro - No gross peripheral or central neuro deficits on inspection  Heart - Sinus. RRR. S1 and S2 present. No added HS/murmurs appreciated.  No elevated JVD appreciated  Lung - Adequate air entry b/l, coarse crackles appreciated  GI - Soft. No guarding/rigidity. No hepatosplenomegaly/ascites. BS+   - No CVA/suprapubic tenderness or palpable bladder distension  Skin - Intact. No rash/petechiae/ecchymosis. Warm extremities  MSK - Joints with normal ROM.  No joint swellings    Lines/Drains/Airways/Wounds:  [unfilled]    LABS AND IMAGING   CBC  [unfilled]    Last 3 Hemoglobin  Lab Results   Component Value Date    HGB 14.5 11/10/2021    HGB 12.7 09/23/2021    HGB 15.3 06/06/2020     Last 3 WBC/ANC  Lab Results   Component Value Date    WBC 15.0 11/10/2021    WBC 11.6 09/23/2021    WBC 10.0 06/06/2020     No components found for: GRNLOCTYABS  Last 3 Platelets  No results found for: PLATELET  Chemistry  [unfilled]  [unfilled]  No results found for: LDH  Coagulation Studies  Lab Results   Component Value Date    INR 2.53 03/11/2016     Liver Function Studies  Lab Results   Component Value Date    ALT 10 11/10/2021    AST 23 11/10/2021    ALKPHOS 73 11/10/2021       Recent Imaging        Relevant labs and imaging reviewed    ASSESSMENT AND PLAN   Jcarlos Perez is a 66 y.o. male p/w    Hypoxia likely d/t COVID  - CXR with infiltrates  - abx given in ED, c/w if procal elevated and pending hospital course  -COVID-19 testing and related labs: CRP, d-dimer, ferritin, fibrinogen, LDH, lactate, procalcitonin, viral panel  - sputum cuture, legionella antigen, strep pneumonia antigen  - decadron  - PRN O2 via NC  - pulmonary consult/ID if needed    atrial fibrillation/flutter with RVR  -TSH added on, follow-up  -On anticoagulation  -On rate control medication  -ED started amio, will continue  -Cardiology follow-up    AMS in setting of illness, wifes states he appears baseline, and that when he is ill he gets confused like this  - neurochecks  - CT unremarkable, if no improvement in mental status consider neuro consult    cva with left sided weakness at baseline, 2009  HTN  DM2 on insulin     Case d/w ED provider    DVT ppx: lovenox  Code status: full    Igor, Internal Medicine  11/10/2021 at 10:54 PM     Due to the immediate potential for life-threatening deterioration due to afib rvr and acute hypoxic respiratory failure, I spent 75 minutes providing critical care. This time is excluding time spent performing procedures.

## 2021-11-11 NOTE — CONSULTS
Admitted with covid  Has afib and NSVT  Correct K and mag  Place on amiodarone and cardizem if needed  Hx of CAD and CABG            PAST MEDICAL HISTORY:  History of stroke on the left side, history of  hypertension, hyperlipidemia, diabetes, coronary artery disease status post  CABG 5 vessels in 1997, dysphagia, pneumonia, UTI, kidney stones present,  cholecystitis. PAST SURGICAL HISTORY:  Patient has a history of having bypass surgical 5  vessels, history of gallbladder today laparoscopically.      Electronically signed by Juju Vaca MD on 11/10/21 at 10:14 PM EST

## 2021-11-11 NOTE — CONSULTS
[address], [facility]                                  CONSULTATION    PATIENT NAME: Rajani Hankins                   :        1945  MED REC NO:   [id number]                          ROOM:       2017  ACCOUNT NO:   [de-identified]                           ADMIT DATE: 11/10/2021  PROVIDER:     Dami Pendleton MD    DATE OF CONSULTATION:  2021    INDICATION:  Atrial fibrillation. HISTORY OF PRESENT ILLNESS:  This is a 78-year-old male patient who came  to the hospital yesterday with having shortness of breath and has COVID  present. He is also found to have atrial fibrillation. He is hypoxic. He had one week ago, home test done which was positive. He is 72% on  room air. He is placed on oxygen. The patient is hard of hearing. The  patient has a history of stroke with right-sided weakness present. The  patient has a history of coronary artery disease also present. He is  found to be in AFib and nonsustained VT present. Otherwise, he is  pleasant and he appears to be comfortable. His echo is pending at this time. The patient is known to have coronary artery disease and he has had CABG  done. He had a 5-vessel bypasses done in  at [facility];  not sure of the grafts. History of having a stroke. History of having  diabetes. 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 He has been a diabetic since that time. He also has kidney  stones present. He had a gallbladder surgery done. History of SVT in  the past.    PAST MEDICAL HISTORY:  History of a stroke, left-sided. History of  hypertension, hyperlipidemia, diabetes, coronary artery disease status  post 5-vessel bypass surgery done. I do not know his graft. Dysphagia,  pneumonia, UTI, kidney stones, and cholecystitis present. PAST SURGICAL HISTORY:  History of a 5-vessel bypass surgery done.    History of gallbladder removed, and also I think he has a partial  pancreatectomy done. SOCIAL HISTORY:  He does not smoke and does not drink. ALLERGIES:  His allergies are three medications, MENTHOL, ZINC OXIDE,  LYRICA, and PRADAXA. MEDICATIONS AT HOME:  Prior to the hospitalization, he was on Bactrim,  Percocet, Protonix, Zoloft, Desyrel, and Pravachol. PHYSICAL EXAMINATION:  GENERAL:  The patient is awake, alert, answers the questions. He  follows the commands with the eyes. He is able to answer a few  questions. VITAL SIGNS:  Temperature afebrile, pulse is in 80s, sinus rhythm, and  with the PVCs present. Blood pressure is 117/56. HEENT:  Head is normocephalic and atraumatic. Pupils are equal, round,  and reactive to light. CHEST:  Equal expansion. LUNGS:  Clear to auscultation. No wheezing or rhonchi appreciated. Decreased breath sounds present. HEART:  Irregularly irregular. ABDOMEN:  Soft and nontender. Bowel sounds are present. No  hepatosplenomegaly or guarding appreciated. EXTREMITIES:  No cyanosis is noted. NEUROLOGIC:  Cranial nerves are grossly intact. LABORATORY DATA:  Potassium is 2.9. Mag was low, which was corrected. Troponins are negative. BNP is 5300. LFTs are normal.  TSH and T4 are  normal.  CBC is within normal range. Ferritin is elevated at 1600. INR  is therapeutic. D-dimer is elevated at 1253. CT of the head without  contrast with no acute abnormality noted, large old right MCA infarct  noted. IMPRESSION:  This is a a78-year-old male patient who comes in with having  hypoxia and multifocal bilateral pneumonia is noted secondary to COVID. He is positive for COVID. From a cardiac standpoint, he is in AFib. The plan at this time is rate  control and anticoagulate him. He is at risk for having recurrent  strokes plus he has COVID at this time. Further recommendations based  on the hospital course. Overall prognosis is guarded.         Dakota Mcdonald MD    D: 11/11/2021 8:38:38       T: 11/11/2021 8:44:18     [email or web address]  Job#: [id number]     Doc#: [id number]    CC:

## 2021-11-11 NOTE — PROGRESS NOTES
.        Hospitalist Progress Note      Name:  Demarco Melgoza /Age/Sex: 1945  (66 y.o. male)   MRN & CSN:  [id number] & [id number] Admission Date/Time: 11/10/2021  3:47 PM   Location:  -A PCP: Cinda Stacy MD         Hospital Day: 2    Assessment and Plan:   Demarco Melgoza is a 66 y.o.  male  who presents with <principal problem not specified>    1. ***    Diet ADULT DIET; Regular   DVT Prophylaxis [] Lovenox, []  Heparin, [] SCDs, [] Ambulation   GI Prophylaxis [] PPI,  [] H2 Blocker,  [] Carafate,  [] Diet/Tube Feeds   Code Status Full Code   Disposition Patient requires continued admission due to ***   MDM [] Low, [] Moderate,[]  High  Patient's risk as above due to ***     History of Present Illness:     Chief Complaint: <principal problem not specified>  Demarco Melgoza is a 66 y.o.  male  who presents with ***       Ten point ROS reviewed negative, unless as noted above    Objective: Intake/Output Summary (Last 24 hours) at 2021 1618  Last data filed at 2021 1249  Gross per 24 hour   Intake 490 ml   Output    Net 490 ml      Vitals:   Vitals:    21 1249   BP:    Pulse: 104   Resp: 18   Temp:    SpO2: 95%     Physical Exam:   GEN Awake male, sitting upright in bed in no apparent distress. Appears given age. EYES Pupils are equally round. No scleral erythema, discharge, or conjunctivitis. HENT Mucous membranes are moist. Oral pharynx without exudates, no evidence of thrush. NECK Supple, no apparent thyromegaly or masses. RESP Clear to auscultation, no wheezes, rales or rhonchi. Symmetric chest movement while on room air. CARDIO/VASC S1/S2 auscultated. Regular rate without appreciable murmurs, rubs, or gallops. No JVD or carotid bruits. Peripheral pulses equal bilaterally and palpable. No peripheral edema. GI Abdomen is soft without significant tenderness, masses, or guarding. Bowel sounds are normoactive. Rectal exam deferred.     No costovertebral angle tenderness. Normal appearing external genitalia. Ennis catheter is not present. HEME/LYMPH No palpable cervical lymphadenopathy and no hepatosplenomegaly. No petechiae or ecchymoses. MSK No gross joint deformities. SKIN Normal coloration, warm, dry. NEURO Cranial nerves appear grossly intact, normal speech, no lateralizing weakness. PSYCH Awake, alert, oriented x 4. Affect appropriate. Medications:   Medications:    [START ON 11/12/2021] amiodarone  200 mg Oral Daily    apixaban  5 mg Oral BID    insulin lispro  0-18 Units SubCUTAneous TID WC    insulin lispro  0-9 Units SubCUTAneous Nightly    metoprolol tartrate  25 mg Oral TID    guaiFENesin  600 mg Oral BID    insulin lispro  5 Units SubCUTAneous TID WC    insulin glargine  15 Units SubCUTAneous Nightly    pantoprazole  40 mg Oral Daily    pravastatin  40 mg Oral Daily    sertraline  100 mg Oral Daily    tamsulosin  0.4 mg Oral Daily    sodium chloride flush  5-40 mL IntraVENous 2 times per day    dexamethasone  6 mg Oral Daily      Infusions:    dextrose      0.45 % NaCl with KCl 20 mEq 75 mL/hr at 11/11/21 0900    amiodarone 0.5 mg/min (11/11/21 1016)    sodium chloride       PRN Meds: glucose, 15 g, PRN  dextrose, 12.5 g, PRN  glucagon (rDNA), 1 mg, PRN  dextrose, 100 mL/hr, PRN  sodium chloride flush, 5-40 mL, PRN  sodium chloride, 25 mL, PRN  ondansetron, 4 mg, Q8H PRN   Or  ondansetron, 4 mg, Q6H PRN  polyethylene glycol, 17 g, Daily PRN  acetaminophen, 650 mg, Q6H PRN   Or  acetaminophen, 650 mg, Q6H PRN  potassium chloride, 40 mEq, PRN   Or  potassium alternative oral replacement, 40 mEq, PRN   Or  potassium chloride, 10 mEq, PRN          Patient is still admitted because ***. The anticipated discharge is in {><24 hrs:42216}.      Electronically signed by Yves Greco MD on 11/11/2021 at 4:18 PM

## 2021-11-11 NOTE — ED NOTES
Wife Li has been updated on pt condition and plan of care.  No further questions at this time      Adiel Nagel RN  11/10/21 2007

## 2021-11-11 NOTE — ED NOTES
This nurse and Ilinaa Gonzalez have spoken with pt's wife Ivy Wade and daughter regarding confirmation on pt's code status.  Family has confirmed that pt is a DNR comfort care- arrest, they have verbalized understanding and have no further questions at this time      Kristal Sevilla RN  11/10/21 [id number]

## 2021-11-11 NOTE — ED NOTES
transferring pt to room 2017 and doing bedside report with [facility], RN  11/10/21 [id number]

## 2021-11-12 NOTE — PROGRESS NOTES
.        Hospitalist Progress Note      Name:  Kelsea Arenas /Age/Sex: 1945  (66 y.o. male)   MRN & CSN:  [id number] & [id number] Admission Date/Time: 11/10/2021  3:47 PM   Location:  -A PCP: Blair Hoover MD         Hospital Day: 3    Assessment and Plan:   Kelsea Arenas is a 66 y.o.  male  who presents with <principal problem not specified>    1. Acute hypoxemic respiratory failure due to covid pneumonia. Is on 13 liters oxygen.   -Continue decadron.   -Add baricitinib  -Pulmonary consult    2. Atrial fibrillation/flutter with RVR  -TSH added on, follow-up  -On anticoagulation  -On rate control medication  -ED started amio, will continue  -Cardiology follow-up     3. AMS in setting of illness, wifes states he appears baseline  -cva with left sided weakness at baseline,   - neurochecks  - CT unremarkable     4. DM2 on insulin. Uncontrolled. SSI. Consult Endocrinology     5. Hypokalemia. Replacement protocol. 6. HTN      Diet ADULT DIET; Regular   DVT Prophylaxis [] Lovenox, []  Heparin, [] SCDs, [] Ambulation   GI Prophylaxis [] PPI,  [] H2 Blocker,  [] Carafate,  [] Diet/Tube Feeds   Code Status Full Code   Disposition Patient requires continued admission due to    MDM [] Low, [] Moderate,[]  High  Patient's risk as above due to      History of Present Illness:     Chief Complaint: <principal problem not specified>  Kelsea Arenas is a 66 y.o.  male  who presents with worsening sob, covid pneumonia,hypoxia. Is on 13 liters oxygen. Has no complaint. Request assistance with his meal.       Ten point ROS reviewed negative, unless as noted above    Objective:        Intake/Output Summary (Last 24 hours) at 2021 1016  Last data filed at 2021 1900  Gross per 24 hour   Intake 2334.11 ml   Output --   Net 2334.11 ml      Vitals:   Vitals:    21 0600   BP: 119/62   Pulse: 72   Resp: 15   Temp:    SpO2: 93%     Physical Exam:   GEN Awake male, sitting upright in bed in no apparent distress. Appears given age. EYES Pupils are equally round. No scleral erythema, discharge, or conjunctivitis. HENT Mucous membranes are moist. Oral pharynx without exudates, no evidence of thrush. NECK Supple, no apparent thyromegaly or masses. RESP Diminished breath sounds, few rhonchi. Symmetric chest movement. CARDIO/VASC S1/S2 auscultated. Regular rate without appreciable murmurs, rubs, or gallops. No JVD or carotid bruits. Peripheral pulses equal bilaterally and palpable. No peripheral edema. GI Abdomen is soft without significant tenderness, masses, or guarding. Bowel sounds are normoactive. Rectal exam deferred. MSK No gross joint deformities. SKIN Normal coloration, warm, dry. NEURO Cranial nerves appear intact, normal speech. Left hemiplegia. PSYCH Awake, alert, oriented x 3. Affect appropriate.     Medications:   Medications:    baricitinib  4 mg Oral Daily    amiodarone  200 mg Oral Daily    apixaban  5 mg Oral BID    insulin lispro  0-18 Units SubCUTAneous TID WC    insulin lispro  0-9 Units SubCUTAneous Nightly    metoprolol tartrate  25 mg Oral TID    guaiFENesin  600 mg Oral BID    insulin lispro  5 Units SubCUTAneous TID WC    insulin glargine  15 Units SubCUTAneous Nightly    pantoprazole  40 mg Oral Daily    pravastatin  40 mg Oral Daily    sertraline  100 mg Oral Daily    tamsulosin  0.4 mg Oral Daily    sodium chloride flush  5-40 mL IntraVENous 2 times per day    dexamethasone  6 mg Oral Daily      Infusions:    dextrose      0.45 % NaCl with KCl 20 mEq 75 mL/hr at 11/11/21 2122    sodium chloride       PRN Meds: glucose, 15 g, PRN  dextrose, 12.5 g, PRN  glucagon (rDNA), 1 mg, PRN  dextrose, 100 mL/hr, PRN  sodium chloride flush, 5-40 mL, PRN  sodium chloride, 25 mL, PRN  ondansetron, 4 mg, Q8H PRN   Or  ondansetron, 4 mg, Q6H PRN  polyethylene glycol, 17 g, Daily PRN  acetaminophen, 650 mg, Q6H PRN   Or  acetaminophen, 650 mg, Q6H PRN  potassium chloride, 40 mEq, PRN   Or  potassium alternative oral replacement, 40 mEq, PRN   Or  potassium chloride, 10 mEq, PRN            Electronically signed by Edwin Santana MD on 11/12/2021 at 10:16 AM

## 2021-11-12 NOTE — CONSULTS
Endocrinology   Consult Note      Dear Doctor Syed Larson for the Consult     Pt. Was Admitted for : Shortness of breath hypoxia on home pulse oximeter  Patient is nonverbal    Reason for Consult: Better control of blood glucose      History Obtained From:  Patient/ EMR       HISTORY OF PRESENT ILLNESS:                The patient is a 66 y.o. male with significant past medical history of pH, CAD, CABG bradycardia, diabetes mellitus, dysphagia hypertension, hyperlipidemia was admitted to hospital for shortness of breath and cough was atrial fibrillation with rapid ventricular response. Patient also has CVA with left-sided paralysis nonverbal.  Patient was positive for Covid bony I was  consulted for better control of blood glucose. ROS:   Pt's ROS done in detail. Abnormal ROS are noted in Medical and Surgical History Section below:      Other Medical History:        Diagnosis Date    Anxiety     BPH (benign prostatic hyperplasia)     CAD (coronary artery disease)     Constipation     Cyst     liver and pancreas    Debility     Diabetes mellitus ([address]     Diabetic ulcer of left foot associated with diabetes mellitus due to underlying condition, with fat layer exposed ([address] 7/13/2021    Diabetic ulcer of left foot associated with diabetes mellitus due to underlying condition, with muscle involvement without evidence of necrosis ([address] 7/13/2021    Dysphagia     GERD (gastroesophageal reflux disease)     Hyperlipidemia     Hypertension     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 1995    less feeling in left lower extremity    Unspecified cerebral artery occlusion with cerebral infarction 2009     Surgical History:        Procedure Laterality Date    CARDIAC SURGERY      CHOLECYSTECTOMY, LAPAROSCOPIC  03/31/15    CORONARY ARTERY BYPASS GRAFT      quad 1997    INNER EAR SURGERY      cyst removal and reconstruction    LEG SURGERY         Allergies:  Calmoseptine [menthol-zinc oxide], Lyrica [pregabalin], and Pradaxa [dabigatran etexilate mesylate]    Family History:   History reviewed. No pertinent family history. REVIEW OF SYSTEMS:  Review of System Done as noted above     PHYSICAL EXAM:      Vitals:    /61   Pulse 96   Temp 98.1 °F (36.7 °C) (Oral)   Resp 18   Wt 202 lb 3.2 oz (91.7 kg)   SpO2 92%   BMI 29.86 kg/m²     CONSTITUTIONAL:  awake, alert, cooperative, appears stated age not verbal  EYES:  vision intact Fundoscopic Exam not performed   ENT:Normal  NECK:  Supple, No JVD. Thyroid Exam:Normal   LUNGS:  Has Vesicular Breath Sounds, diminished breath sounds and some wheezing and rales has been on oxygen  CARDIOVASCULAR: Atrial fibrillation  ABDOMEN:  No scars, normal bowel sounds, soft, non-distended, non-tender, no masses palpated, no hepatolienomegaly  Musculoskeletal: Normal  Extremities: Normal, peripheral pulses normal, , has no edema   NEUROLOGIC:  Awake, drowsy, oriented to name, place and time. Cranial nerves II-XII are grossly intact. Nonverbal motor is  intact. Sensory is intact.  ,  and gait is abnormal.  And possibly unstable    DATA:    CBC:   Recent Labs     11/10/21  1608 11/11/21  0211   WBC 15.0* 10.3   HGB 14.5 13.2*    245    CMP:  Recent Labs     11/10/21  1608 11/11/21  0211 11/12/21  0440   * 133* 135   K 2.7* 2.9* 3.2*   CL 94* 98* 101   CO2 23 20* 20*   BUN 27* 26* 28*   CREATININE 0.6* 0.5* 0.4*   CALCIUM 8.6 8.0* 7.8*   PROT 6.5 5.3*  --    LABALBU 2.4* 2.4*  --    BILITOT 0.6 0.4  --    ALKPHOS 73 67  --    AST 23 23  --    ALT 10 9*  --      Lipids:   Lab Results   Component Value Date    CHOL 159 07/13/2020    HDL 41 07/13/2020    TRIG 125 07/13/2020     Glucose:   Recent Labs     11/12/21  0616 11/12/21  1141 11/12/21  1716   POCGLU 286* 360* 245*     Hemoglobin A1C:   Lab Results   Component Value Date    LABA1C 8.6 09/23/2021     Free T4:   Lab Results   Component Value Date    T4FREE 1.09 11/10/2021 Free T3: No results found for: FT3  TSH High Sensitivity:   Lab Results   Component Value Date    TSHHS 1.890 11/10/2021       CT HEAD WO CONTRAST   Final Result   No acute intracranial abnormality. Large old right MCA infarct severe chronic microvascular disease within the   periventricular         XR CHEST PORTABLE   Final Result   1. Multifocal bilateral pneumonia in this patient with given history of   COVID-19. Scheduled Medicines   Medications:    baricitinib  4 mg Oral Daily    insulin glargine  30 Units SubCUTAneous Nightly    insulin lispro  15 Units SubCUTAneous TID WC    insulin lispro  0-12 Units SubCUTAneous TID WC    insulin lispro  0-6 Units SubCUTAneous 2 times per day    amiodarone  200 mg Oral Daily    apixaban  5 mg Oral BID    metoprolol tartrate  25 mg Oral TID    guaiFENesin  600 mg Oral BID    insulin glargine  15 Units SubCUTAneous Nightly    pantoprazole  40 mg Oral Daily    pravastatin  40 mg Oral Daily    sertraline  100 mg Oral Daily    tamsulosin  0.4 mg Oral Daily    sodium chloride flush  5-40 mL IntraVENous 2 times per day    dexamethasone  6 mg Oral Daily      Infusions:    dextrose      0.45 % NaCl with KCl 20 mEq 75 mL/hr at 11/12/21 1211    sodium chloride           IMPRESSION    Patient Active Problem List   Diagnosis    Cerebral infarction ([address]    HTN (hypertension)    DM (diabetes mellitus) with complications ([address]    Hypercholesteremia    CAD (coronary artery disease)    Occlusion and stenosis of carotid artery    Stroke, acute, within 8 weeks    Hemiparesis, left ([address]    Dysphagia    Aphasia    Pneumonia due to organism    Leukocytosis    Cholecystitis    Sepsis ([address]    WD-Osteomyelitis of great toe of left foot ([address]    Diabetic ulcer of left foot associated with diabetes mellitus due to underlying condition, with fat layer exposed ([address]    COVID-19         RECOMMENDATIONS:      1. Reviewed POC blood glucose . Labs and X ray results   2. Reviewed Home and Current Medicines   3. Will Start On meal/ Correction bolus Humalog/ Lantus Insulin regime /  4. Monitor Blood glucose frequently   5. Modify  the dose of Insulin  as needed        Will follow with you  Again thank you for sharing pt's care with me.      Truly yours,       Kong Dempsey MD

## 2021-11-12 NOTE — CONSULTS
Subjective:   CHIEF COMPLAINT / HPI:  66year old male with sob which is worsening. He denies any wheeze. He has  Cough which is non productive. He is covid positive and has pneumonia.  He is requiring high fio2      Past Medical History:  Past Medical History:   Diagnosis Date    Anxiety     BPH (benign prostatic hyperplasia)     CAD (coronary artery disease)     Constipation     Cyst     liver and pancreas    Debility     Diabetes mellitus ([address]     Diabetic ulcer of left foot associated with diabetes mellitus due to underlying condition, with fat layer exposed ([address] 7/13/2021    Diabetic ulcer of left foot associated with diabetes mellitus due to underlying condition, with muscle involvement without evidence of necrosis ([address] 7/13/2021    Dysphagia     GERD (gastroesophageal reflux disease)     Hyperlipidemia     Hypertension     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 1995    less feeling in left lower extremity    Unspecified cerebral artery occlusion with cerebral infarction 2009       Past Surgical History:        Procedure Laterality Date    CARDIAC SURGERY      CHOLECYSTECTOMY, LAPAROSCOPIC  03/31/15    CORONARY ARTERY BYPASS GRAFT      quad 1997    INNER EAR SURGERY      cyst removal and reconstruction    LEG SURGERY         Current Medications:    Current Facility-Administered Medications: baricitinib (OLUMIANT) tablet 4 mg, 4 mg, Oral, Daily  bumetanide (BUMEX) injection 2 mg, 2 mg, IntraVENous, Once  glucose (GLUTOSE) 40 % oral gel 15 g, 15 g, Oral, PRN  dextrose 50 % IV solution, 12.5 g, IntraVENous, PRN  glucagon (rDNA) injection 1 mg, 1 mg, IntraMUSCular, PRN  dextrose 5 % solution, 100 mL/hr, IntraVENous, PRN  0.45 % NaCl with KCl 20 mEq infusion, , IntraVENous, Continuous  amiodarone (CORDARONE) tablet 200 mg, 200 mg, Oral, Daily  apixaban (ELIQUIS) tablet 5 mg, 5 mg, Oral, BID  insulin lispro (HUMALOG) injection vial 0-18 Units, 0-18 Units, SubCUTAneous, TID   insulin lispro (HUMALOG) injection vial 0-9 Units, 0-9 Units, SubCUTAneous, Nightly  metoprolol tartrate (LOPRESSOR) tablet 25 mg, 25 mg, Oral, TID  guaiFENesin (MUCINEX) extended release tablet 600 mg, 600 mg, Oral, BID  insulin lispro (HUMALOG) injection vial 5 Units, 5 Units, SubCUTAneous, TID   insulin glargine (LANTUS) injection vial 15 Units, 15 Units, SubCUTAneous, Nightly  pantoprazole (PROTONIX) tablet 40 mg, 40 mg, Oral, Daily  pravastatin (PRAVACHOL) tablet 40 mg, 40 mg, Oral, Daily  sertraline (ZOLOFT) tablet 100 mg, 100 mg, Oral, Daily  tamsulosin (FLOMAX) capsule 0.4 mg, 0.4 mg, Oral, Daily  sodium chloride flush 0.9 % injection 5-40 mL, 5-40 mL, IntraVENous, 2 times per day  sodium chloride flush 0.9 % injection 5-40 mL, 5-40 mL, IntraVENous, PRN  0.9 % sodium chloride infusion, 25 mL, IntraVENous, PRN  ondansetron (ZOFRAN-ODT) disintegrating tablet 4 mg, 4 mg, Oral, Q8H PRN **OR** ondansetron (ZOFRAN) injection 4 mg, 4 mg, IntraVENous, Q6H PRN  polyethylene glycol (GLYCOLAX) packet 17 g, 17 g, Oral, Daily PRN  acetaminophen (TYLENOL) tablet 650 mg, 650 mg, Oral, Q6H PRN **OR** acetaminophen (TYLENOL) suppository 650 mg, 650 mg, Rectal, Q6H PRN  dexamethasone (DECADRON) tablet 6 mg, 6 mg, Oral, Daily  potassium chloride (KLOR-CON M) extended release tablet 40 mEq, 40 mEq, Oral, PRN **OR** potassium bicarb-citric acid (EFFER-K) effervescent tablet 40 mEq, 40 mEq, Oral, PRN **OR** potassium chloride 10 mEq/100 mL IVPB (Peripheral Line), 10 mEq, IntraVENous, PRN    Allergies   Allergen Reactions    Calmoseptine [Menthol-Zinc Oxide]     Lyrica [Pregabalin] Other (See Comments)     Eyes cross    Pradaxa [Dabigatran Etexilate Mesylate] Other (See Comments)     Headache         Social History:    Social History     Socioeconomic History    Marital status:      Spouse name: None    Number of children: None    Years of education: None    Highest education level: None   Occupational History [employer]   Temp:    98.1 °F (36.7 °C)   TempSrc:    Oral   SpO2: (!) 85% 92% 93% 92%   Weight:               NECK:  Supple, symmetrical, trachea midline, no adenopathy, thyroid symmetric, not enlarged and no tenderness  CHEST: Chest expansion equal and symmetrical, no intercostal retraction. LUNGS:  no increased work of breathing, has expiratory wheezes both lungs, no crackles. CARDIOVASCULAR: S1 and S2, no edema and no JVD  ABDOMEN:  normal bowel sounds, non-distended and no masses palpated, and no tenderness to palpation. No hepatospleenomegaly  LYMPHADENOPATHY:  no axillary or supraclavicular adenopathy. No cervical adnenopathy    MUSCULOSKELETAL: No obvious misalignment or effusion of the joints. No clubbing, cyanosis of the digits. RIGHT AND LEFT LOWER EXTREMITIES: No edema, no inflammation, no tenderness. SKIN:  normal skin color, texture, turgor and no redness, warmth, or swelling. No palpable nodules    DATA:         EXAMINATION:   ONE XRAY VIEW OF THE CHEST       11/10/2021 4:40 pm       COMPARISON:   Chest x-ray October 13, 2017       HISTORY:   ORDERING SYSTEM PROVIDED HISTORY: SOB, hypoxia, reportedly  COVID+   TECHNOLOGIST PROVIDED HISTORY:   Reason for exam:->SOB, hypoxia, reportedly  COVID+   Reason for Exam: SOB, hypoxia, reportedly  COVID+   Acuity: Unknown   Type of Exam: Initial   Additional signs and symptoms: unknown   Relevant Medical/Surgical History: CAD       FINDINGS:   Cardiac silhouette is enlarged but stable.  Atherosclerotic changes of the   aorta.  Postoperative changes of median sternotomy.  Bilateral multifocal   airspace opacities predominant involving the mid and lower lung fields most   consistent with multifocal pneumonia in this patient with given history of   COVID-19.  No large effusion.  No pneumothorax.  Osseous structures appear   stable.  Surgical clips project over the upper abdomen.           Impression   1.  Multifocal bilateral pneumonia in this patient with given

## 2021-11-12 NOTE — PROGRESS NOTES
Pharmacy Consult for Remdesivir Initiation per Dr. Yasmin Ferris per [facility] P&T Committee  **All criteria need to be met to receive   Remdesivir for COVID-19 patients**   Laboratory Results    Confirmed positive COVID-19   Yes   Clinical Status     Within 7 days of symptom onset (< 7 days)  Receiving corticosteroid therapy      Tested positive 2 days ago (home test)  Unsure of symptom onset   Oxygen Status     Requiring supplemental oxygen (<10L)   Yes  13L/min HFNC   Special Considerations    eGFR < 30mL/min: use with caution due to risk of cyclodextrin toxicity      eGFR > 30     Contraindications    1. Invasive or non-invasive mechanical ventilation (>10L NC, HFNC, heated vapotherm, biPAP, or mechanical ventilation)    2. Use of more than 1 vasopressor agent    3. Improving on current treatment/supportive regimen     4. Hepatotoxicity - ALT >10x ULN    5. Elevated inflammatory biomarkers? (Any elevation in CRP, D-dimer, ferritin, or LDH)     HFNC    No vasopressors    Hepatotoxicity    Elevated inflammatory biomarkers         Based on the above criteria, the patient is not a candidate for remdesivir therapy. WIll discuss with provider.     Thank you for the consult,  Brandie Stewart [facility], PharmD

## 2021-11-12 NOTE — PROGRESS NOTES
(HUMALOG) injection vial 0-9 Units  0-9 Units SubCUTAneous Nightly Mile Kim MD   2 Units at 11/11/21 2017    metoprolol tartrate (LOPRESSOR) tablet 25 mg  25 mg Oral TID Garnell Harada, MD   25 mg at 11/12/21 1128    guaiFENesin (MUCINEX) extended release tablet 600 mg  600 mg Oral BID Dung Witt MD   600 mg at 11/12/21 1128    insulin lispro (HUMALOG) injection vial 5 Units  5 Units SubCUTAneous TID WC Dung Witt MD   5 Units at 11/11/21 1238    insulin glargine (LANTUS) injection vial 15 Units  15 Units SubCUTAneous Nightly Dung Witt MD   15 Units at 11/11/21 2017    pantoprazole (PROTONIX) tablet 40 mg  40 mg Oral Daily Juju NORTON MD   40 mg at 11/12/21 0517    pravastatin (PRAVACHOL) tablet 40 mg  40 mg Oral Daily Dung Witt MD   40 mg at 11/11/21 1735    sertraline (ZOLOFT) tablet 100 mg  100 mg Oral Daily Dung Witt MD   100 mg at 11/12/21 1128    tamsulosin (FLOMAX) capsule 0.4 mg  0.4 mg Oral Daily Dung Witt MD   0.4 mg at 11/12/21 1128    sodium chloride flush 0.9 % injection 5-40 mL  5-40 mL IntraVENous 2 times per day Anca Lopez MD   10 mL at 11/12/21 1129    sodium chloride flush 0.9 % injection 5-40 mL  5-40 mL IntraVENous PRN Dung Witt MD        0.9 % sodium chloride infusion  25 mL IntraVENous PRN Dung Witt MD        ondansetron (ZOFRAN-ODT) disintegrating tablet 4 mg  4 mg Oral Q8H PRN Dung Witt MD        Or    ondansetron (ZOFRAN) injection 4 mg  4 mg IntraVENous Q6H PRN Dung Witt MD        polyethylene glycol (GLYCOLAX) packet 17 g  17 g Oral Daily PRN Juju NORTON MD   17 g at 11/12/21 1128    acetaminophen (TYLENOL) tablet 650 mg  650 mg Oral Q6H PRN Dung Witt MD        Or    acetaminophen (TYLENOL) suppository 650 mg  650 mg Rectal Q6H PRN Dung Witt MD        dexamethasone (DECADRON) tablet 6 mg  6 mg Oral Daily Dung Witt MD   6 mg at 11/12/21 1127    potassium chloride (KLOR-CON M) extended release tablet 40 mEq  40 mEq Oral PRN Dung Crespo MD        Or    potassium bicarb-citric acid (EFFER-K) effervescent tablet 40 mEq  40 mEq Oral PRN Unique Wang MD        Or    potassium chloride 10 mEq/100 mL IVPB (Peripheral Line)  10 mEq IntraVENous PRN Unique Wang MD               Labs:  CBC with Differential:    Lab Results   Component Value Date    WBC 10.3 11/11/2021    RBC 4.43 11/11/2021    HGB 13.2 11/11/2021    HCT 41.1 11/11/2021     11/11/2021    MCV 92.8 11/11/2021    MCH 29.8 11/11/2021    MCHC 32.1 11/11/2021    RDW 13.9 11/11/2021    SEGSPCT 90.0 11/11/2021    BANDSPCT 7 10/14/2017    LYMPHOPCT 5.3 11/11/2021    MONOPCT 3.8 11/11/2021    EOSPCT 0.8 03/28/2012    BASOPCT 0.1 11/11/2021    MONOSABS 0.4 11/11/2021    LYMPHSABS 0.6 11/11/2021    EOSABS 0.0 11/11/2021    BASOSABS 0.0 11/11/2021    DIFFTYPE AUTOMATED DIFFERENTIAL 11/11/2021     CMP:    Lab Results   Component Value Date     11/11/2021    K 2.9 11/11/2021    CL 98 11/11/2021    CO2 20 11/11/2021    BUN 26 11/11/2021    CREATININE 0.5 11/11/2021    GFRAA >60 11/11/2021    LABGLOM >60 11/11/2021    GLUCOSE 527 11/11/2021    PROT 5.3 11/11/2021    PROT 6.6 08/30/2012    LABALBU 2.4 11/11/2021    CALCIUM 8.0 11/11/2021    BILITOT 0.4 11/11/2021    ALKPHOS 67 11/11/2021    AST 23 11/11/2021    ALT 9 11/11/2021     Hepatic Function Panel:    Lab Results   Component Value Date    ALKPHOS 67 11/11/2021    ALT 9 11/11/2021    AST 23 11/11/2021    PROT 5.3 11/11/2021    PROT 6.6 08/30/2012    BILITOT 0.4 11/11/2021    LABALBU 2.4 11/11/2021     Magnesium:    Lab Results   Component Value Date    MG 2.0 11/11/2021     PT/INR:    Lab Results   Component Value Date    PROTIME 11.8 11/11/2021    PROTIME 10.8 05/29/2012    INR 0.92 11/11/2021     Last 3 Troponin:    Lab Results   Component Value Date    TROPONINI 0.012 03/21/2012     U/A:    Lab Results   Component Value Date    COLORU YELLOW 09/23/2021    WBCUA 1 09/23/2021    RBCUA 3 09/23/2021    MUCUS OCCASIONAL 03/11/2016    TRICHOMONAS NONE SEEN 09/23/2021    BACTERIA NEGATIVE 09/23/2021    CLARITYU CLEAR 09/23/2021    SPECGRAV 1.005 09/23/2021    LEUKOCYTESUR NEGATIVE 09/23/2021    UROBILINOGEN NEGATIVE 09/23/2021    BILIRUBINUR NEGATIVE 09/23/2021    BLOODU MODERATE 09/23/2021     ABG:    Lab Results   Component Value Date    QGX1CZI 45.0 07/11/2016    PO2ART 80 07/11/2016    FPN7JFL 34.3 07/11/2016     FLP:    Lab Results   Component Value Date    TRIG 125 07/13/2020    HDL 41 07/13/2020    LDLDIRECT 100 07/13/2020     TSH:  No results found for: TSH   DATA:   ECG: Sinus Rhythm       ASSESSMENT:   1 acute respiratory failure due to COVID-19 pneumonia patient currently on high flow oxygen of 13 L  2 atrial fibrillation with RVR rate is not better controlled on anticoagulation as well  3 acute mental status changes patient still somewhat confused  4 CAD with history of a CABG x5 vessel in 1997  5 cardiomyopathy EF 30% down from 50% from 2015 echocardiogram  Will need ischemic evaluation once recovered  6 diabetes management hospitalist group  7 hypertension relatively controlled  8 high cholesterol  PLAN   Bumex 2 mg IV x1  Continue anticoagulation and beta-blocker and amiodarone

## 2021-11-12 NOTE — PROGRESS NOTES
Pharmacy Consult for Baricitinib Initiation per Dr. Palak Chew per [facility] P&T Committee  **All criteria need to be met to receive   Baricitinib for COVID-19 patients**   Age    >5years old     Yes   Laboratory Results    Confirmed positive COVID-19     PLUS    ANY elevation in biomarkers   (CRP, D-dimer, LDH, ferritin)       Yes  Ferritin: 1601  D-dimer: 923  LDH: 339  CRP: 107.1     Concomitant Therapy    Receiving systemic steroids for treatment of COVID 19     Yes   Oxygen Status        Requiring supplemental oxygen   (>1 L NC, HFNC, Heated Vapotherm, biPAP, mechanical ventilation)        Yes  HFNC @ 13L/min   Special Considerations    Pregnancy  Severe Hepatic Impairment  Chronic/Recurrent Infections   Hemoglobin <8       None present     Contraindications    1. Invasive active mycobacterial or fungal infection  2. Lymphocyte count <200  3. Neutrophil count, ANC <500   4. Significant immunosuppression      None     LIVER FUNCTION LAB EVALUATION  Recent Labs     11/11/21 0211   AST 23   ALT 9*   ALKPHOS 67   BILITOT 0.4   INR 0.92     RENAL LAB EVALUATION  Estimated Creatinine Clearance: 138 mL/min (A) (based on SCr of 0.5 mg/dL (L)). Recent Labs     11/10/21  1608 11/11/21 0211   BUN 27* 26*   CREATININE 0.6* 0.5*     HEMOGLOBIN AND HEMATOCRIT  Recent Labs     11/10/21  1608 11/10/21  1608 11/11/21 0211   HGB 14.5   < > 13.2*   HCT 43.9  --  41.1*    < > = values in this interval not displayed.      [address]  Recent Labs     11/10/21  1608 11/11/21 0211   SEGSABS 12.3 9.3     Lymphocyte: 600    Dosing Recommendations:    Baricitinib 4 mg PO daily x 14 days (or until hospital discharge)    Renal dose adjustment:  -eGFR > 60: no adjustment necessary   -eGFR 30 - 60: 2 mg PO daily   -eGFR 15 - 30: 1 mg PO daily   -eGFR <15: not recommended   - HD, PD, CRRT: no recommendations at this time     Thank you for the consult,  JAS Pina [facility], PharmD

## 2021-11-12 NOTE — CARE COORDINATION
Chart reviewed to initiate discharge planning. Per chart review, pt is from home w/ his wife. Pt wears 3L home O2 - currently on 13L. Pt has PCP and insurance with RX coverage. Pt is nonverbal with left sided weakness at baseline per physician charting. Pt was active with [facility] [address] [facility]. CM will continue to follow for pt progress and possible discharge needs. Pt will need an inpatient referral to [facility] at discharge if returning home when medically stable.

## 2021-11-13 NOTE — PROGRESS NOTES
Speech Language Pathology  Facility/Department: [facility] ICU STEPDOWN   CLINICAL BEDSIDE SWALLOW EVALUATION    NAME: Kaylee Ny  : 1945  MRN: [id number]    ADMISSION DATE: 11/10/2021  ADMITTING DIAGNOSIS: has Cerebral infarction ([address] HTN (hypertension); DM (diabetes mellitus) with complications ([address] Hypercholesteremia; CAD (coronary artery disease); Occlusion and stenosis of carotid artery; Stroke, acute, within 8 weeks; Hemiparesis, left ([address] Dysphagia; Aphasia; Pneumonia due to organism; Leukocytosis; Cholecystitis; Sepsis ([facility] [facility] WD-Osteomyelitis of great toe of left foot ([facility] [address] Diabetic ulcer of left foot associated with diabetes mellitus due to underlying condition, with fat layer exposed ([facility] [address] and COVID-19 on their problem list.      Impressions: Kaylee Ny was seen for a bedside swallowing evaluation after being admitted to [address] [place] [address] with COVID-19 and SOB. Pt was alert and cooperative throughout assessment. Relevant medical hx includes GERD. CAD, CVA, hypertension and pneumonia. Nursing reports pt was having difficulty with solid foods d/t reduced dentition. Pt was positioned upright in bed and presented with a weak vocal quality at baseline. He was taken on non-rebreather for assessment and placed on nasal canula. Pt was unable to follow commands to complete oral mechanism examination at this time. PO trials of puree, soft solids, nectar thick liquids and thin liquids by cup/straw sips were given. Moderate oral dysphagia was observed characterized by prolonged mastication and moderate lingual residue with trials of soft solids. Suspect moderate pharyngeal dysphagia characterized by delayed swallow initiation and reduced laryngeal elevation. Pt demonstrated a delayed cough with all trials of thin liquids. Clear vocal quality and 0 overt s/s of aspiration were observed with trials of puree and nectar thick liquids.   Continue PO trials were deferred due to O2 sats dropping to 86- nursing made aware and pt was placed back on non-rebreather. Recommend downgrade diet to pureed solids/nectar thick liquids with strict aspiration precautions. SLP will continue to follow Elly Henning closely for diet tolerance monitoring. ONSET DATE: this admission     Date of Eval: 11/13/2021  Evaluating Therapist: Tildon Angelucci, SLP    Current Diet level:  Current Diet : Regular  Current Liquid Diet : Thin      Primary Complaint  Patient Complaint: weakness    Pain:  Pain Assessment  Pain Assessment: Faces  Pain Level: 0  Vunog-Baker Pain Rating: No hurt  Patient's Stated Pain Goal: No pain  PAINAD (Pain Assessment in Advance Dementia)  Breathing: normal  Negative Vocalization: none  Facial Expression: smiling or inexpressive  Body Language: relaxed  Consolability: no need to console  PAINAD Score: 0    Reason for Referral  Elly Henning was referred for a bedside swallow evaluation to assess the efficiency of his swallow function, identify signs and symptoms of aspiration and make recommendations regarding safe dietary consistencies, effective compensatory strategies, and safe eating environment. Impression  Dysphagia Diagnosis: Moderate pharyngeal stage dysphagia; Moderate oral stage dysphagia  Dysphagia Outcome Severity Scale: Level 2: Moderate Severe dysphagia- Maximum assistance or maximum use of strategies with partial PO only     Treatment Plan  Requires SLP Intervention: Yes  Duration/Frequency of Treatment: 3-4xs weekly/ LOS or until goals are met  D/C Recommendations: To be determined       Recommended Diet and Intervention  Diet Solids Recommendation: Dysphagia Pureed (Dysphagia I)  Liquid Consistency Recommendation: Mildly Thick (Nectar)  Recommended Form of Meds: Crushed in puree as able     Therapeutic Interventions: Diet tolerance monitoring; Therapeutic PO trials with SLP    Compensatory Swallowing Strategies  Compensatory Swallowing Strategies:  Alternate solids and liquids; Eat/Feed slowly; Upright as possible for all oral intake    Treatment/Goals  Short-term Goals  Timeframe for Short-term Goals: LOS or until goals are met  Goal 1: Pt will tolerate puree diet/nectar thick liquids without clinical evidence of aspiration 100%  Goal 2: Pt will participate in advanced trials for possible safe diet level advancement 10/10 trials  Goal 3: Pt/caregivers will demonstrate comprehension of recommendations/POC    General  Chart Reviewed: Yes  Behavior/Cognition: Alert; Cooperative; Pleasant mood  Respiratory Status: O2 via nasual cannula  O2 Device: Non-rebreather mask  Communication Observation: Aphasia  Follows Directions: None  Dentition: Adequate  Patient Positioning: Upright in bed  Baseline Vocal Quality: Normal  Volitional Cough: Weak  Volitional Swallow: Delayed  Prior Dysphagia History: Pt denies hx of dysphagia  Consistencies Administered: Dysphagia Minced and Moist (Dysphagia II); Dysphagia Pureed (Dysphagia I); Thin - straw; Thin - teaspoon; Nectar - straw; Nectar - teaspoon; Nectar - cup           Vision/Hearing  Vision  Vision: Within Functional Limits  Hearing  Hearing: Exceptions to [facility]    Oral Motor Deficits  Oral/Motor  Oral Motor: Exceptions to [facility]    Oral Phase Dysfunction  Oral Phase  Oral Phase: Exceptions     Indicators of Pharyngeal Phase Dysfunction   Pharyngeal Phase  Pharyngeal Phase: Exceptions    Prognosis  Prognosis  Prognosis for safe diet advancement: fair  Individuals consulted  Consulted and agree with results and recommendations: Patient; RN    Education  Patient Education: recommendations/POC  Patient Education Response: Verbalizes understanding  Safety Devices in place: Yes  Type of devices:  All fall risk precautions in place       Therapy Time  SLP Individual Minutes  Time In: 6081  Time Out: 5  Minutes: [address], [facility]-SLP, 11/13/2021

## 2021-11-13 NOTE — PROGRESS NOTES
Progress Note( Dr. Candelaria Senior)  11/13/2021  Subjective:   Admit Date: 11/10/2021  PCP: Lupe Rangel MD    Admitted For :Shortness of breath hypoxia on home pulse oximeter  Patient is nonverbal    Consulted For:  Better control of blood glucoseHistory:    Interval history :appears somewhat better patient is nonverbal still    Denies any chest pains,   Yes SOB . On oxygen only  Denies nausea or vomiting. No new bowel or bladder symptoms. No intake or output data in the 24 hours ending 11/13/21 1422    DATA    CBC: Recent Labs     11/10/21  1608 11/11/21 0211   WBC 15.0* 10.3   HGB 14.5 13.2*    245    CMP:  Recent Labs     11/10/21  1608 11/10/21  1608 11/11/21  0211 11/12/21  0440 11/13/21  0600   *   < > 282* 637 DUPLICATE ORDER   K 2.7*   < > 2.9* 3.2* DUPLICATE ORDER   CL 94*   < > 98* 996 DUPLICATE ORDER   CO2 23   < > 20* 20* DUPLICATE ORDER   BUN 27*   < > 26* 28* DUPLICATE ORDER   CREATININE 0.6*   < > 0.5* 0.4* DUPLICATE ORDER   CALCIUM 8.6   < > 8.0* 7.8* DUPLICATE ORDER   PROT 6.5  --  5.3*  --   --    LABALBU 2.4*  --  2.4*  --   --    BILITOT 0.6  --  0.4  --   --    ALKPHOS 73  --  67  --   --    AST 23  --  23  --   --    ALT 10  --  9*  --   --     < > = values in this interval not displayed. Lipids:   Lab Results   Component Value Date    CHOL 159 07/13/2020    HDL 41 07/13/2020    TRIG 125 07/13/2020     Glucose:  Recent Labs     11/13/21  0209 11/13/21  0919 11/13/21  1253   POCGLU 225* 204* 216*     [id number]:  Lab Results   Component Value Date    LABA1C 9.2 11/12/2021     High Sensitivity TSH:   Lab Results   Component Value Date    TSHHS 1.890 11/10/2021     Free T3: No results found for: FT3  Free T4:  Lab Results   Component Value Date    T4FREE 1.09 11/10/2021       CT HEAD WO CONTRAST   Final Result   No acute intracranial abnormality.       Large old right MCA infarct severe chronic microvascular disease within the   periventricular         XR CHEST PORTABLE Leukocytosis     Cholecystitis     Sepsis ([facility]     WD-Osteomyelitis of great toe of left foot ([facility] [address]     Diabetic ulcer of left foot associated with diabetes mellitus due to underlying condition, with fat layer exposed ([facility] [address]     COVID-19      Plan:     1. Reviewed POC blood glucose . Labs and X ray results   2. Reviewed Current Medicines   3. On Correction bolus Humalog/ Basal Lantus Insulin regime   4. Monitor Blood glucose frequently   5. Modified  the dose of Insulin/ other medicines as needed   6. Will follow     .      Sanjay Lowery MD, MD

## 2021-11-13 NOTE — PROGRESS NOTES
Rufina Pereira MD, [address]                Internal Medicine Hospitalist             Daily Progress  Note   Subjective:     Chief Complaint   Patient presents with    Positive For Covid-19     SOB, sxmfor 1 weeks, + home test yest, 77% on scene on NRB upon arrival. Home 02 at 3Lpm     Mr. Carrington Vazquez of nil, awake unable to tell if he understands does not talk although some commands. Left side spastic. Objective:    BP (!) 129/57   Pulse 86   Temp 97.6 °F (36.4 °C) (Axillary)   Resp 21   Wt 202 lb 3.2 oz (91.7 kg)   SpO2 97%   BMI 29.86 kg/m²      Intake/Output Summary (Last 24 hours) at 11/13/2021 0855  Last data filed at 11/12/2021 1100  Gross per 24 hour   Intake 240 ml   Output --   Net 240 ml      Physical Exam:  Heart: Distant heart sounds due to poor quality disposable stethoscope  Lungs: Mostly clear to auscultation, decreased breath sounds at bases. No wheezes appreciated no crackles heard. Difficult to hear due to poor quality disposable stethoscope. Abdomen: Soft, non distended. Bowel sounds not appreciated. No obvious liver or spleen enlargement. Non tender, no rebound noted. Extremities: Non tender, trace swelling noted,   CNS: Grossly unchanged left side spastic.     Labs:  CBC with Differential:    Lab Results   Component Value Date    WBC 10.3 11/11/2021    RBC 4.43 11/11/2021    HGB 13.2 11/11/2021    HCT 41.1 11/11/2021     11/11/2021    MCV 92.8 11/11/2021    MCH 29.8 11/11/2021    MCHC 32.1 11/11/2021    RDW 13.9 11/11/2021    SEGSPCT 90.0 11/11/2021    BANDSPCT 7 10/14/2017    LYMPHOPCT 5.3 11/11/2021    MONOPCT 3.8 11/11/2021    EOSPCT 0.8 03/28/2012    BASOPCT 0.1 11/11/2021    MONOSABS 0.4 11/11/2021    LYMPHSABS 0.6 11/11/2021    EOSABS 0.0 11/11/2021    BASOSABS 0.0 11/11/2021    DIFFTYPE AUTOMATED DIFFERENTIAL 11/11/2021     CMP:    Lab Results   Component Value Date     11/12/2021    K 3.2 11/12/2021     11/12/2021    CO2 20 11/12/2021    BUN 28 11/12/2021    CREATININE 0.4 11/12/2021    GFRAA >60 11/12/2021    LABGLOM >60 11/12/2021    GLUCOSE 315 11/12/2021    PROT 5.3 11/11/2021    PROT 6.6 08/30/2012    LABALBU 2.4 11/11/2021    CALCIUM 7.8 11/12/2021    BILITOT 0.4 11/11/2021    ALKPHOS 67 11/11/2021    AST 23 11/11/2021    ALT 9 11/11/2021     Recent Labs     11/10/21  1608 11/11/21  0211   TROPONINT <0.010 <0.010     Lab Results   Component Value Date    TSHHS 1.890 11/10/2021         dextrose      0.45 % NaCl with KCl 20 mEq 75 mL/hr at 11/13/21 0207    sodium chloride        insulin glargine  35 Units SubCUTAneous Nightly    insulin lispro  0-12 Units SubCUTAneous 2 times per day    baricitinib  4 mg Oral Daily    insulin lispro  15 Units SubCUTAneous TID WC    insulin lispro  0-12 Units SubCUTAneous TID WC    amiodarone  200 mg Oral Daily    apixaban  5 mg Oral BID    metoprolol tartrate  25 mg Oral TID    guaiFENesin  600 mg Oral BID    insulin glargine  15 Units SubCUTAneous Nightly    pantoprazole  40 mg Oral Daily    pravastatin  40 mg Oral Daily    sertraline  100 mg Oral Daily    tamsulosin  0.4 mg Oral Daily    sodium chloride flush  5-40 mL IntraVENous 2 times per day    dexamethasone  6 mg Oral Daily         Assessment:       Patient Active Problem List    Diagnosis Date Noted    Cerebral infarction ([facility] 03/21/2012    Occlusion and stenosis of carotid artery 03/24/2012    HTN (hypertension) 03/21/2012    DM (diabetes mellitus) with complications ([facility] 54/94/2685    CAD (coronary artery disease) 03/21/2012    Hypercholesteremia 03/21/2012    COVID-19 11/10/2021    Diabetic ulcer of left foot associated with diabetes mellitus due to underlying condition, with fat layer exposed ([facility] 07/13/2021    WD-Osteomyelitis of great toe of left foot ([facility] 05/04/2021    Sepsis ([facility] 07/11/2016    Cholecystitis 03/29/2015    Pneumonia due to organism 02/18/2015    Leukocytosis 02/18/2015    Stroke, acute, within 8 weeks 03/29/2012    Hemiparesis, left ([address] 03/29/2012    Dysphagia 03/29/2012    Aphasia 03/29/2012       Plan:     Problems being addressed this admission:   1. Acute hypoxemic respiratory failure due to covid pneumonia. 11/12/2021 is on 13 liters oxygen.-Continue decadron. -Add baricitinib-Pulmonary consult  11/13/2021-still requires high amount of oxygen continue to monitor. Check ABG may be a candidate for intubation stable. Check BNP. I will stop IV fluids may be getting into failure. Other recommendations as per pulmonology.     2. Atrial fibrillation/flutter with RVR  11/12/2021-TSH added on, [id number] anticoagulation-On rate control medication  -ED started amio, will continue-Cardiology follow-up  11/13/2021-cardiology saw him agrees patient is in A. fib. The plan at this time is rate control and anticoagulate him. He is at risk for having recurrent strokes plus he has Covid at this time further recommendation based on hospital course. Overall prognosis guarded. On Eliquis 5 mg twice a day Cordarone 200 mg once a day.     3. AMS in setting of illness, wifes states he appears baseline  11/12/2021-cva with left sided weakness at baseline, 2009- neurochecks- CT unremarkable  11/13/2021-CT done on 11/10/2021 shows large old right MCA infarct severe chronic microvascular disease within the periventricular. Will get swallow eval if unable to do so may need NG and a PEG at some stage. Dietary consult.     4. DM2 on insulin. 11/12/2021 uncontrolled. SSI. Consult Endocrinology  11/13/2021-A1c is 9.2 sugar 225 today further recommendations per endocrinology.     5. Hypokalemia. 11/12/2021 replacement protocol. 11/13/2021-his potassium was 3.2 yesterday we will repeat labs today I have stopped his IV fluid as he may be going into failure. His output has not been recorded     6. HTN  11/13/2021 -his blood pressure is 129/57 today continue to monitor. Consultants:  Endocrinology  Pulmonology  Cardiology    General Orders:  Repeat basic labs again in am.  I am not able to explain to the patient as I think he is not in a condition to understand discussed with RN. The above chart was generated partly using Dragon dictation system, it may contain dictation errors given the limitations of this technology.      Jessica Trevino MD, [address]

## 2021-11-13 NOTE — PROGRESS NOTES
Daily Progress Note  Pt. Lethargic, response to voice  HR afib in 90's  BP stable  On 15L O2  Will give another order of bumex  Continue Lopressor, Amio and eliquis    Covid 19 pneumonia     15L on O2    On decadron and baricitinib    Treatment per primary care    Pulmonology following    Afib with RVR    New onset    Amio, metoprolol and eliquis started    Afib today on tele HR 90's      Cardiomyopathy    EF 30%, last echo was 50% in 2015    Check BNP today    IV Bumex given yesterday    Will work on work up once recover from infection    Will also optimize medications at that time. Echo 11/11/2021 Summary   Expedited imaging was used to obtain protocol images to reduce the   sonographer's exposure during COVID-19 pandemic. Left ventricular systolic function is abnormal.   Ejection fraction is visually estimated at 30%. Very frequent PVC's (up to 10 in a row noted). Mild left ventricular hypertrophy. Grade I diastolic dysfunction. Sclerotic, but non-stenotic aortic valve. Mild mitral and tricuspid regurgitation. No evidence of any pericardial effusion    PAST MEDICAL HISTORY:  History of a stroke, left-sided. History of  hypertension, hyperlipidemia, diabetes, coronary artery disease status  post 5-vessel bypass surgery done. I do not know his graft. Dysphagia,  pneumonia, UTI, kidney stones, and cholecystitis present.     PAST SURGICAL HISTORY:  History of a 5-vessel bypass surgery done.    History of gallbladder removed, and also I think he has a partial  pancreatectomy done.     SOCIAL HISTORY:  He does not smoke and does not drink.     ALLERGIES:  His allergies are three medications, MENTHOL, ZINC OXIDE,  LYRICA, and PRADAXA.     MEDICATIONS AT HOME:  Prior to the hospitalization, he was on Bactrim,  Percocet, Protonix, Zoloft, Desyrel, and Pravachol.     Objective:   BP (!) 118/44   Pulse 100   Temp 97.8 °F (36.6 °C) (Oral)   Resp 17   Wt 202 lb 3.2 oz (91.7 kg)   SpO2 97%   BMI 29.86 kg/m²     Intake/Output Summary (Last 24 hours) at 11/13/2021 1035  Last data filed at 11/12/2021 1100  Gross per 24 hour   Intake 240 ml   Output --   Net 240 ml       Medications:   Scheduled Meds:   insulin glargine  35 Units SubCUTAneous Nightly    insulin lispro  0-12 Units SubCUTAneous 2 times per day    baricitinib  4 mg Oral Daily    insulin lispro  15 Units SubCUTAneous TID WC    insulin lispro  0-12 Units SubCUTAneous TID WC    amiodarone  200 mg Oral Daily    apixaban  5 mg Oral BID    metoprolol tartrate  25 mg Oral TID    guaiFENesin  600 mg Oral BID    insulin glargine  15 Units SubCUTAneous Nightly    pantoprazole  40 mg Oral Daily    pravastatin  40 mg Oral Daily    sertraline  100 mg Oral Daily    tamsulosin  0.4 mg Oral Daily    sodium chloride flush  5-40 mL IntraVENous 2 times per day    dexamethasone  6 mg Oral Daily      Infusions:   dextrose      sodium chloride        PRN Meds:  glucose, dextrose, glucagon (rDNA), dextrose, sodium chloride flush, sodium chloride, ondansetron **OR** ondansetron, polyethylene glycol, acetaminophen **OR** acetaminophen, potassium chloride **OR** potassium alternative oral replacement **OR** potassium chloride       Physical Exam:  Vitals:    11/13/21 0951   BP:    Pulse:    Resp:    Temp:    SpO2: 97%        General: AAO, NAD  Chest: Nontender  Cardiac: First and Second Heart Sounds are Normal, No Murmurs or Gallops noted  Lungs:Clear to auscultation and percussion. Abdomen: Soft, NT, ND, +BS  Extremities: No clubbing, no edema  Vascular:  Equal 2+ peripheral pulses.         Lab Data:  CBC:   Recent Labs     11/10/21  1608 11/11/21 0211   WBC 15.0* 10.3   HGB 14.5 13.2*   HCT 43.9 41.1*   MCV 92.8 92.8    245     BMP:   Recent Labs     11/11/21  0211 11/12/21  0440 11/13/21  0600   * 173 DUPLICATE ORDER   K 2.9* 3.2* DUPLICATE ORDER   CL 98* 656 DUPLICATE ORDER   CO2 20* 20* DUPLICATE ORDER   BUN 26* 28* DUPLICATE ORDER CREATININE 0.5* 0.4* DUPLICATE ORDER     LIVER PROFILE:   Recent Labs     11/10/21  1608 11/11/21 0211   AST 23 23   ALT 10 9*   BILITOT 0.6 0.4   ALKPHOS 73 67     PT/INR:   Recent Labs     11/11/21 0211   PROTIME 11.8   INR 0.92     APTT:   Recent Labs     11/11/21 0211   APTT 32.2     BNP:  No results for input(s): BNP in the last 72 hours.       Assessment:  Patient Active Problem List    Diagnosis Date Noted    Cerebral infarction ([address] 03/21/2012    Occlusion and stenosis of carotid artery 03/24/2012    HTN (hypertension) 03/21/2012    DM (diabetes mellitus) with complications ([address] 65/35/8177    CAD (coronary artery disease) 03/21/2012    Hypercholesteremia 03/21/2012    COVID-19 11/10/2021    Diabetic ulcer of left foot associated with diabetes mellitus due to underlying condition, with fat layer exposed ([address] 07/13/2021    WD-Osteomyelitis of great toe of left foot ([address] 05/04/2021    Sepsis ([address] 07/11/2016    Cholecystitis 03/29/2015    Pneumonia due to organism 02/18/2015    Leukocytosis 02/18/2015    Stroke, acute, within 8 weeks 03/29/2012    Hemiparesis, left ([address] 03/29/2012    Dysphagia 03/29/2012    Aphasia 03/29/2012       Electronically signed by TIFFANIE Mayberry - CNP on 11/13/2021 at 10:35 AM  Patient seen and examined agree with above assessment and plan

## 2021-11-14 NOTE — PROGRESS NOTES
Comprehensive Nutrition Assessment    Type and Reason for Visit:  Initial, Consult (General Nutrition Management/Other Reasons)    Nutrition Recommendations/Plan:   · Modify diet per SLP recommendation   · Elevate HOB at meal time   · Recommend 1:1 feed assist, set up meals to encourage po intakes  · Trial chocolate Diabetic oral nutrition supplement once daily, serve immediately cold for nectar thick consistency  · Start high protein and softened snacks per pt preferences   · If pt's intake decreases less than 50% consistently, consider NG and initiate EN. · Consult for tube feeding order and management as needed    Nutrition Assessment:  Admitted with acute respiratory failure 2/2 COVID 19 PNA, A fib, AMS, DMII. PMH: CVA, CAD, GERD. Pt on pureed diet with nectar thick liquids per SLP, recommend feed slowly and in upright position. PO intakes dos up to 75%. Spoke with wife over the phone, wife reports pt with poor intake over 1 week r/t COVID infection. States pt needs set up assistance and assist feed, has no teeth, needs extra gravies/moisteners and ground up meat, right-hand able to feed at times. Pt drinks chocolate boost drinks at home. Will add preferences to meal tickets. States pt with stable wt, UBW ranges between 205-220 lb. If pt is unable to meet greater than 50% of po intakes, consider NG or PEG for better nutrition. Follow as high nutrition risk. Malnutrition Assessment:  Malnutrition Status:  Insufficient data  (Contact isolation)  Context:  Chronic Illness     Findings of the 6 clinical characteristics of malnutrition:  Energy Intake:  Mild decrease in energy intake (Comment) (50% or less over 1 week)  Weight Loss:  No significant weight loss     Body Fat Loss:  Unable to assess     Muscle Mass Loss:  Unable to assess    Fluid Accumulation:  Unable to assess     Strength:  Not Performed    Estimated Daily Nutrient Needs:  Energy (kcal):  6752-3071 ([place] 1898);  Weight Used for Energy Requirements:  Current     Protein (g):  80-95 (1.1-1.3 g/kg IBW); Weight Used for Protein Requirements:  Ideal        Fluid (ml/day):  ~2000; Method Used for Fluid Requirements:  1 ml/kcal      Nutrition Related Findings:  Pt is nonverbal today, impaired vision. Wife states someone needs to point out food for him, but he is willing to eat once set up. Labs: A1c 9.2,       Wounds:   (Wound on foot)       Current Nutrition Therapies:    ADULT DIET; Dysphagia - Minced and Moist; Mildly Thick (Nectar)    Anthropometric Measures:  · Height: 5' 9\" (175.3 cm)  · Current Body Weight: 202 lb 2.6 oz (91.7 kg)   · Admission Body Weight: 202 lb 2.6 oz (91.7 kg) (first measured)    · Usual Body Weight: 205 lb (93 kg) (7/9/21)     · Ideal Body Weight: 160 lbs; % Ideal Body Weight 126.4 %   · BMI: 29.8  · Adjusted Body Weight:  ; No Adjustment (left-sided spastic)   · BMI Categories: Overweight (BMI 25.0-29. 9)       Nutrition Diagnosis:   · Inadequate oral intake related to swallowing difficulty as evidenced by swallow study results, poor intake prior to admission (varied po intakes during stay)    Nutrition Interventions:   Food and/or Nutrient Delivery:  Modify Current Diet, Start Oral Nutrition Supplement, Snacks (Comment)  Nutrition Education/Counseling:  No recommendation at this time   Coordination of Nutrition Care:  Continue to monitor while inpatient    Goals:  Pt will consume at least 75% of meals during stay       Nutrition Monitoring and Evaluation:   Behavioral-Environmental Outcomes:  None Identified   Food/Nutrient Intake Outcomes:  Food and Nutrient Intake, Supplement Intake  Physical Signs/Symptoms Outcomes:  Biochemical Data, Chewing or Swallowing, GI Status, Weight     Discharge Planning:     Too soon to determine     Electronically signed by Umesh Vargas RD, VU on 11/14/21 at 9:18 AM EST    Contact: [phone]

## 2021-11-14 NOTE — PROGRESS NOTES
pulmonary      SUBJECTIVE:  Remains on high fio2     OBJECTIVE    VITALS:  BP (!) 124/55   Pulse 84   Temp 99.9 °F (37.7 °C) (Axillary)   Resp 14   Ht 5' 9\" (1.753 m)   Wt 202 lb 3.2 oz (91.7 kg)   SpO2 97%   BMI 29.86 kg/m²   HEAD AND FACE EXAM:  No throat injection, no active exudate,no thrush  NECK EXAM;No JVD, no masses, symmetrical  CHEST EXAM; Expansion equal and symmetrical, no masses  LUNG EXAM; Good breath sounds bilaterally. There are expiratory wheezes both lungs, there are crackles at both lung bases  CARDIOVASCULAR EXAM: Positive S1 and S2, no S3 or S4, no clicks ,no murmurs  RIGHT AND LEFT LOWER EXTRIMITY EXAM: No edema, no swelling, no inflamation            LABS   Lab Results   Component Value Date    WBC 11.0 (H) 11/14/2021    HGB 14.3 11/14/2021    HCT 43.2 11/14/2021    MCV 91.5 11/14/2021     11/14/2021     Lab Results   Component Value Date    CREATININE DUPLICATE ORDER 40/56/6475    BUN DUPLICATE ORDER 82/65/7073    NA DUPLICATE ORDER 80/52/9646    K DUPLICATE ORDER 93/86/8857    CL DUPLICATE ORDER 06/29/6009    CO2 DUPLICATE ORDER 24/24/7881     Lab Results   Component Value Date    INR 0.92 11/11/2021    PROTIME 11.8 11/11/2021          Lab Results   Component Value Date    PHOS 2.2 10/18/2017    PHOS 2.8 07/11/2016        Recent Labs     11/13/21  0915   PH 7.47*   PO2ART 65*   RYJ2VLT 40.0   O2SAT 93.2*         Wt Readings from Last 3 Encounters:   11/12/21 202 lb 3.2 oz (91.7 kg)   07/09/21 205 lb (93 kg)   10/18/17 184 lb 14.4 oz (83.9 kg)               ASSESMENT  Ac resp failure  covid pneumonia  Ac bronchospasm        PLAN  1. cpm  2. Cont high fio2 and try to wean  3.  On decadron and baricitinab    11/14/2021  Bri Alves MD, MMADALYN.

## 2021-11-14 NOTE — PLAN OF CARE
Nutrition Problem #1: Inadequate oral intake  Intervention: Food and/or Nutrient Delivery: Modify Current Diet, Start Oral Nutrition Supplement, Snacks (Comment)  Nutritional Goals: Pt will consume at least 75% of meals during stay

## 2021-11-14 NOTE — PROGRESS NOTES
Progress Note( Dr. Vilma Carolina)  11/14/2021  Subjective:   Admit Date: 11/10/2021  PCP: Keyanna Costa MD    Admitted For :Shortness of breath hypoxia on home pulse oximeter  Patient is nonverbal    Consulted For:  Better control of blood glucoseHistory:    Interval history :appears somewhat better patient is non verbal still      Denies any chest pains,   Yes SOB . On oxygen only  Denies nausea or vomiting. Able to swallow and eating lunch when he was fed by nurse  No new bowel or bladder symptoms. Intake/Output Summary (Last 24 hours) at 11/14/2021 1435  Last data filed at 11/13/2021 1826  Gross per 24 hour   Intake 240 ml   Output --   Net 240 ml       DATA    CBC:   Recent Labs     11/14/21  0646   WBC 11.0*   HGB 14.3       CMP:  Recent Labs     11/12/21  0440 11/13/21  0600 11/14/21  3608    DUPLICATE ORDER 729   K 3.2* DUPLICATE ORDER 2.5*    DUPLICATE ORDER 697   CO2 20* DUPLICATE ORDER 26   BUN 28* DUPLICATE ORDER 25*   CREATININE 0.4* DUPLICATE ORDER 0.4*   CALCIUM 7.8* DUPLICATE ORDER 7.8*   PROT  --   --  5.4*   LABALBU  --   --  2.6*  2.6*   BILITOT  --   --  0.7   ALKPHOS  --   --  85   AST  --   --  36   ALT  --   --  21     Lipids:   Lab Results   Component Value Date    CHOL 159 07/13/2020    HDL 41 07/13/2020    TRIG 125 07/13/2020     Glucose:  Recent Labs     11/14/21  0211 11/14/21  0755 11/14/21  1200   POCGLU 154* 179* 80     [id number]:  Lab Results   Component Value Date    LABA1C 9.2 11/12/2021     High Sensitivity TSH:   Lab Results   Component Value Date    TSHHS 1.890 11/10/2021     Free T3: No results found for: FT3  Free T4:  Lab Results   Component Value Date    T4FREE 1.09 11/10/2021       XR CHEST PORTABLE   Final Result   Slightly decreased but persistent pulmonary opacities could represent edema   or multifocal pneumonia         CT HEAD WO CONTRAST   Final Result   No acute intracranial abnormality.       Large old right MCA infarct severe chronic microvascular disease within the   periventricular         XR CHEST PORTABLE   Final Result   1. Multifocal bilateral pneumonia in this patient with given history of   COVID-19. Scheduled Medicines   Medications:    insulin glargine  35 Units SubCUTAneous Nightly    insulin lispro  0-12 Units SubCUTAneous 2 times per day    baricitinib  4 mg Oral Daily    insulin lispro  15 Units SubCUTAneous TID WC    insulin lispro  0-12 Units SubCUTAneous TID WC    amiodarone  200 mg Oral Daily    apixaban  5 mg Oral BID    metoprolol tartrate  25 mg Oral TID    guaiFENesin  600 mg Oral BID    pantoprazole  40 mg Oral Daily    pravastatin  40 mg Oral Daily    sertraline  100 mg Oral Daily    tamsulosin  0.4 mg Oral Daily    sodium chloride flush  5-40 mL IntraVENous 2 times per day    dexamethasone  6 mg Oral Daily      Infusions:    dextrose      sodium chloride           Objective:   Vitals: BP (!) 124/55   Pulse 84   Temp 99.9 °F (37.7 °C) (Axillary)   Resp 14   Ht 5' 9\" (1.753 m)   Wt 202 lb 3.2 oz (91.7 kg)   SpO2 97%   BMI 29.86 kg/m²   General appearance: alert and cooperative with exam nonverbal  Neck: no JVD or bruit  Thyroid : Normal lobes   Lungs: Has Vesicular Breath sounds   Heart:  regular rate and rhythm  Abdomen: soft, non-tender; bowel sounds normal; no masses,  no organomegaly  Musculoskeletal: Normal  Extremities: extremities normal, , no edema  Neurologic:  Awake, alert, oriented to name, place and time. Cranial nerves II-XII are grossly intact. Except nonverbal motor weakness. Sensory is intact. ,  and gait is abnormal.  Appears to be bed ridden CVA    Assessment:     Patient Active Problem List:     Cerebral infarction ([address]     HTN (hypertension)     DM (diabetes mellitus) with complications (HCC)     Hypercholesteremia     CAD (coronary artery disease)     Occlusion and stenosis of carotid artery     Stroke, acute, within 8 weeks     Hemiparesis, left ([address] Dysphagia     Aphasia     Pneumonia due to organism     Leukocytosis     Cholecystitis     Sepsis ([facility]     WD-Osteomyelitis of great toe of left foot ([facility]     Diabetic ulcer of left foot associated with diabetes mellitus due to underlying condition, with fat layer exposed ([facility]     COVID-19      Plan:     1. Reviewed POC blood glucose . Labs and X ray results   2. Reviewed Current Medicines   3. On Correction bolus Humalog/ Basal Lantus Insulin regime   4. Monitor Blood glucose frequently   5. Modified  the dose of Insulin/ other medicines as needed   6. Will follow     .      Jesus Romo MD, MD

## 2021-11-14 NOTE — PROGRESS NOTES
ORDER 92/63/7257    GFRAA DUPLICATE ORDER 40/17/0983    LABGLOM DUPLICATE ORDER 75/98/9582    GLUCOSE DUPLICATE ORDER 13/25/7966    PROT 5.3 11/11/2021    PROT 6.6 08/30/2012    LABALBU 2.4 10/92/4006    CALCIUM DUPLICATE ORDER 92/24/3613    BILITOT 0.4 11/11/2021    ALKPHOS 67 11/11/2021    AST 23 11/11/2021    ALT 9 11/11/2021     No results for input(s): TROPONINT in the last 72 hours.   Lab Results   Component Value Date    TSH 1.890 11/10/2021         dextrose      sodium chloride        insulin glargine  35 Units SubCUTAneous Nightly    insulin lispro  0-12 Units SubCUTAneous 2 times per day    baricitinib  4 mg Oral Daily    insulin lispro  15 Units SubCUTAneous TID WC    insulin lispro  0-12 Units SubCUTAneous TID WC    amiodarone  200 mg Oral Daily    apixaban  5 mg Oral BID    metoprolol tartrate  25 mg Oral TID    guaiFENesin  600 mg Oral BID    pantoprazole  40 mg Oral Daily    pravastatin  40 mg Oral Daily    sertraline  100 mg Oral Daily    tamsulosin  0.4 mg Oral Daily    sodium chloride flush  5-40 mL IntraVENous 2 times per day    dexamethasone  6 mg Oral Daily         Assessment:       Patient Active Problem List    Diagnosis Date Noted    Cerebral infarction ([facility] 03/21/2012    Occlusion and stenosis of carotid artery 03/24/2012    HTN (hypertension) 03/21/2012    DM (diabetes mellitus) with complications ([facility] 47/24/6180    CAD (coronary artery disease) 03/21/2012    Hypercholesteremia 03/21/2012    COVID-19 11/10/2021    Diabetic ulcer of left foot associated with diabetes mellitus due to underlying condition, with fat layer exposed ([address] 07/13/2021    WD-Osteomyelitis of great toe of left foot ([address] 05/04/2021    Sepsis ([facility] 07/11/2016    Cholecystitis 03/29/2015    Pneumonia due to organism 02/18/2015    Leukocytosis 02/18/2015    Stroke, acute, within 8 weeks 03/29/2012    Hemiparesis, left ([address] 03/29/2012    Dysphagia 03/29/2012    Aphasia 03/29/2012 Plan:     Problems being addressed this admission:   1. Acute hypoxemic respiratory failure due to covid pneumonia. 11/12/2021 is on 13 liters oxygen.-Continue decadron. -Add baricitinib-Pulmonary consult  11/13/2021-still requires high amount of oxygen continue to monitor. Check ABG may be a candidate for intubation stable. Check BNP. I will stop IV fluids may be getting into failure. Other recommendations as per pulmonology. 11/14/2021-was seen by pulmonary yesterday continue high flow oxygen and try to wean as tolerated continue with Decadron and baricitinib. Continues to be on over 15 L oxygen. Would need SNF upon discharge check with case management. 2. Atrial fibrillation/flutter with RVR  11/12/2021-TSH added on, [id number] anticoagulation-On rate control medication  -ED started amio, will continue-Cardiology follow-up  11/13/2021-cardiology saw him agrees patient is in A. fib. The plan at this time is rate control and anticoagulate him. He is at risk for having recurrent strokes plus he has Covid at this time further recommendation based on hospital course. Overall prognosis guarded. On Eliquis 5 mg twice a day Cordarone 200 mg once a day.     3. AMS in setting of illness, wifes states he appears baseline  11/12/2021-cva with left sided weakness at baseline, 2009- neurochecks- CT unremarkable  11/13/2021-CT done on 11/10/2021 shows large old right MCA infarct severe chronic microvascular disease within the periventricular. Will get swallow eval if unable to do so may need NG and a PEG at some stage. Dietary consult. 11/14/2021-dietary recommendations as below puréed solids/nectar thick liquids with strict aspiration precautions.     4. DM2 on insulin. 11/12/2021 uncontrolled. SSI. Consult Endocrinology  11/13/2021-A1c is 9.2 sugar 225 today further recommendations per endocrinology. 7/14/2021-sugars 179 today continue to monitor.     5. Hypokalemia.   11/12/2021 replacement protocol. 11/13/2021-his potassium was 3.2 yesterday we will repeat labs today I have stopped his IV fluid as he may be going into failure. His output has not been recorded  11/14/2021-check on his labs and a chest x-ray     6.  HTN  11/13/2021 -his blood pressure is 129/57 today continue to monitor. 11/14/2021-his blood pressure is 124/55 today. History of CVA  11/14/2021-speech saw him yesterday recommend downgrade diet to puréed solids/nectar thick liquids with strict aspiration precautions. CT scan done 11/10/2021 shows large old right MCA infarct severe chronic microvascular disease within the periventricular     Consultants:  Endocrinology  Pulmonology  Cardiology    General Orders:  Repeat basic labs again in am.    The above chart was generated partly using Dragon dictation system, it may contain dictation errors given the limitations of this technology.      Reji Mills MD, [address]

## 2021-11-14 NOTE — PROGRESS NOTES
Daily Progress Note  Awake, More interactive today  Still on 15L nonrebreather mask. Sats are looking well  HR afib, rate controlled, continue amio and metoprolol  Covid 19 infection  Will check BNP  Supportive care    Covid 19 pneumonia     15L on O2; nonrebreather mask    On decadron and baricitinib    Treatment per primary care    Pulmonology following     Afib with RVR    New onset    Amio, metoprolol and eliquis started    Afib today on tele HR 80'S      Cardiomyopathy    EF 30%, last echo was 50% in 2015    Check BNP today    Received more IV Bumex yesterday     Will work on work up once recover from infection    Will also optimize medications at that time.      Hx of CVA; left sided deficients     Echo 11/11/2021 Summary   Expedited imaging was used to obtain protocol images to reduce the   sonographer's exposure during COVID-19 pandemic.   Left ventricular systolic function is abnormal.   Ejection fraction is visually estimated at 30%.  Very frequent PVC's (up to 10 in a row noted).  Mild left ventricular hypertrophy.   Grade I diastolic dysfunction.   Sclerotic, but non-stenotic aortic valve.   Mild mitral and tricuspid regurgitation.   No evidence of any pericardial effusion     PAST MEDICAL HISTORY:  History of a stroke, left-sided.  History of  hypertension, hyperlipidemia, diabetes, coronary artery disease status  post 5-vessel bypass surgery done.  I do not know his graft.  Dysphagia,  pneumonia, UTI, kidney stones, and cholecystitis present.     PAST SURGICAL HISTORY:  History of a 5-vessel bypass surgery done.    History of gallbladder removed, and also I think he has a partial  pancreatectomy done.     SOCIAL HISTORY:  He does not smoke and does not drink.     ALLERGIES:  His allergies are three medications, MENTHOL, ZINC OXIDE,  LYRICA, and PRADAXA.     MEDICATIONS AT HOME:  Prior to the hospitalization, he was on Bactrim,  Percocet, Protonix, Zoloft, Desyrel, and Pravachol.     Objective:   BP (!) 124/55   Pulse 84   Temp 99.9 °F (37.7 °C) (Axillary)   Resp 14   Ht 5' 9\" (1.753 m)   Wt 202 lb 3.2 oz (91.7 kg)   SpO2 97%   BMI 29.86 kg/m²     Intake/Output Summary (Last 24 hours) at 11/14/2021 1025  Last data filed at 11/13/2021 1826  Gross per 24 hour   Intake 330 ml   Output --   Net 330 ml       Medications:   Scheduled Meds:   insulin glargine  35 Units SubCUTAneous Nightly    insulin lispro  0-12 Units SubCUTAneous 2 times per day    baricitinib  4 mg Oral Daily    insulin lispro  15 Units SubCUTAneous TID WC    insulin lispro  0-12 Units SubCUTAneous TID WC    amiodarone  200 mg Oral Daily    apixaban  5 mg Oral BID    metoprolol tartrate  25 mg Oral TID    guaiFENesin  600 mg Oral BID    pantoprazole  40 mg Oral Daily    pravastatin  40 mg Oral Daily    sertraline  100 mg Oral Daily    tamsulosin  0.4 mg Oral Daily    sodium chloride flush  5-40 mL IntraVENous 2 times per day    dexamethasone  6 mg Oral Daily      Infusions:   dextrose      sodium chloride        PRN Meds:  glucose, dextrose, glucagon (rDNA), dextrose, sodium chloride flush, sodium chloride, ondansetron **OR** ondansetron, polyethylene glycol, acetaminophen **OR** acetaminophen, potassium chloride **OR** potassium alternative oral replacement **OR** potassium chloride       Physical Exam:  Vitals:    11/14/21 0752   BP: (!) 124/55   Pulse: 84   Resp: 14   Temp: 99.9 °F (37.7 °C)   SpO2: 97%        General: Nonverbal  Chest: Nontender  Cardiac: First and Second Heart Sounds are Normal, No Murmurs or Gallops noted  Lungs:Clear to auscultation and percussion. Abdomen: Soft, NT, ND, +BS  Extremities: No clubbing, no edema  Vascular:  Equal 2+ peripheral pulses.         Lab Data:  CBC:   Recent Labs     11/14/21  0646   WBC 11.0*   HGB 14.3   HCT 43.2   MCV 91.5     BMP:   Recent Labs     11/12/21  0440 11/13/21  9093    DUPLICATE ORDER   K 3.2* DUPLICATE ORDER    DUPLICATE ORDER   CO2 20* DUPLICATE

## 2021-11-14 NOTE — CARE COORDINATION
Consult received. Chart reviewed - seen by MARGOT on 11/12; will follow for PT/OT recs.  Milagros Nicole RN

## 2021-11-15 NOTE — PROGRESS NOTES
Hospitalist Progress Note      PCP: Cynthai Rome MD    Date of Admission: 11/10/2021    Chief Complaint on Admission: SOB    Pt Seen/Examined and Chart Reviewed. Admitting dx septic shock, covid pneumonia    SUBJECTIVE:     Confused, weak, on 13L, SOB      OBJECTIVE:   Vitals    TEMPERATURE:  Current - Temp: 98.6 °F (37 °C); Max - Temp  Av.3 °F (36.8 °C)  Min: 97.6 °F (36.4 °C)  Max: 98.6 °F (37 °C)  RESPIRATIONS RANGE: Resp  Av.4  Min: 18  Max: 24  PULSE RANGE: Pulse  Av.8  Min: 80  Max: 101  BLOOD PRESSURE RANGE:  Systolic (60HZS), ALR:918 , Min:108 , DFZ:631   ; Diastolic (57TCZ), PKB:61, Min:42, Max:54    PULSE OXIMETRY RANGE: SpO2  Av.6 %  Min: 93 %  Max: 98 %  24HR INTAKE/OUTPUT:  No intake or output data in the 24 hours ending 11/15/21 1655    Exam:    General appearance: Well, no apparent distress, appears stated age and cooperative. HEENT Normal cephalic, atraumatic without obvious deformity. Pupils equal, round, and reactive to light. Extra ocular muscles intact. Conjunctivae/corneas clear. Neck: Supple, No jugular venous distention/bruits. Trachea midline without thyromegaly or adenopathy with full range of motion. Lungs: bilateral rales, no rhonchi  Heart: Regular rate and rhythm with Normal S1/S2 without  murmurs, rubs or gallops, point of maximum impulse non-displaced  Abdomen: Soft, non-tender or non-distended without rigidity or guarding and positive bowel sounds all four quadrants. Extremities: No clubbing, cyanosis, or edema bilaterally. Full range of motion without deformity  Skin: Skin color, texture, turgor normal. No rashes or lesions. Neurologic: Alert and oriented X 1,  neurovascularly intact with sensory/motor intact upper extremities/lower extremities, bilaterally.  Cranial nerves:II-XII intact, grossly non-focal.  Mental status: Alert, confused      Data    Recent Labs     21  0646   WBC 11.0*   HGB 14.3   HCT 43.2         Recent Labs 11/13/21  0600 42/81/06  3992   NA DUPLICATE ORDER 111   K DUPLICATE ORDER 2.5*   CL DUPLICATE ORDER 927   CO2 DUPLICATE ORDER 26   PHOS  --  1.4*   BUN DUPLICATE ORDER 25*   CREATININE DUPLICATE ORDER 0.4*     Recent Labs     11/14/21  1056   AST 36   ALT 21   BILIDIR 0.3   BILITOT 0.7   ALKPHOS 85     No results for input(s): INR in the last 72 hours. No results for input(s): CKTOTAL, CKMB, CKMBINDEX, TROPONINI in the last 72 hours.     Imaging/Test Results    K 2.5  Phos 1.4  a1c 9.2  EF 30%  CXR multifocal PNA        Consults:     IP CONSULT TO CARDIOLOGY  IP CONSULT TO HOSPITALIST  IP CONSULT TO PULMONOLOGY  IP CONSULT TO PHARMACY  IP CONSULT TO ENDOCRINOLOGY  IP CONSULT TO PULMONOLOGY  IP CONSULT TO PHARMACY  IP CONSULT TO DIETITIAN  IP CONSULT TO CASE MANAGEMENT    Allergies  Calmoseptine [menthol-zinc oxide], Lyrica [pregabalin], and Pradaxa [dabigatran etexilate mesylate]    Medications      Scheduled Meds:   insulin glargine  25 Units SubCUTAneous Nightly    sodium phosphate IVPB  20 mmol IntraVENous Once    lisinopril  2.5 mg Oral Daily    insulin lispro  0-12 Units SubCUTAneous 2 times per day    baricitinib  4 mg Oral Daily    insulin lispro  15 Units SubCUTAneous TID WC    insulin lispro  0-12 Units SubCUTAneous TID WC    amiodarone  200 mg Oral Daily    apixaban  5 mg Oral BID    metoprolol tartrate  25 mg Oral TID    guaiFENesin  600 mg Oral BID    pantoprazole  40 mg Oral Daily    pravastatin  40 mg Oral Daily    sertraline  100 mg Oral Daily    tamsulosin  0.4 mg Oral Daily    sodium chloride flush  5-40 mL IntraVENous 2 times per day    dexamethasone  6 mg Oral Daily       Infusions:   dextrose      sodium chloride         PRN Meds:  potassium chloride, glucose, dextrose, glucagon (rDNA), dextrose, sodium chloride flush, sodium chloride, ondansetron **OR** ondansetron, polyethylene glycol, acetaminophen **OR** acetaminophen, potassium chloride **OR** potassium alternative oral replacement **OR** potassium chloride    ASSESSMENT AND PLAN      Active Hospital Problems    Diagnosis Date Noted    COVID-19 [U07.1] 11/10/2021       Problems being addressed this admission:   1. Acute hypoxemic respiratory failure due to covid pneumonia.    on 13 liters oxygen.-Continue decadron. -baricitinib-Pulmonary consulted, wean 02 as tolerated, echo with EF 30%, swallow eval ordered    2. Atrial fibrillation/flutter with RVR  On anticoagulation-On rate control medication  Cardio consulted, on amio  s rate control and anticoagulate him. [facility] is at risk for having recurrent strokes plus he has Covid at this time further recommendation based on hospital course.  Overall prognosis guarded.  On Eliquis 5 mg twice a day Cordarone 200 mg once a day. Septic shock at admission due to covid 19--resolved     3. AMS in setting of illness, wifes states he appears baseline  -cva with left sided weakness at baseline, 2009- neurochecks- CT unremarkable  CT done on 11/10/2021 shows large old right MCA infarct severe chronic microvascular disease within the periventricular.  Will get swallow eval if unable to do so may need NG and a PEG at some stage.  Dietary consult.   puréed solids/nectar thick liquids with strict aspiration precautions. Only oriented to self, very confused     4. DM2 on insulin. 1 uncontrolled. SSI. Consult Endocrinology  -A1c is 9.2, bs improved     5. Hypokalemia. 11/12/2021 replacement protocol. 11/13/2021-his potassium was 3.2 yesterday we will repeat labs today I have stopped his IV fluid as he may be going into failure.  His output has not been recorded  11/14/2021-check on his labs and a chest x-ray     6.  HTN  stable     History of CVA   puréed solids/nectar thick liquids with strict aspiration precautions.   CT scan done 11/10/2021 shows large old right MCA infarct severe chronic microvascular disease within the periventricular    Hypokalemia--replaced, repeat tomorrow    hypophos--replaced, repeat tomorrow    Chronic LV systolic HF--EF 40%, cardio following, CXR with pulm edema vs. Bilateral pneumonia, pt may benefit from IV diuresis, will await chem from today, K 2.5 yesterday before starting diuresis, BNP improved, from admission, repeat BNP as well     Consultants:  Endocrinology  Pulmonology  Cardiology         PT/OT Eval Status: ordered    DVT Prophylaxis: SQ lovenox  Diet: ADULT DIET; Dysphagia - Pureed; Mildly Thick (Nectar);  Likes chocolate flavors  ADULT ORAL NUTRITION SUPPLEMENT; Lunch; Diabetic Oral Supplement  Code Status: Full Code      Dispo - pending clinical improvement, labs ordered for this am, pending, may need IV diuresis, EF 30%, will await chem, on 15L HFNC, wean oxygen as tolerated    Isabela Izaguirre MD

## 2021-11-15 NOTE — PROGRESS NOTES
[id number] [place] OF SPEECH/LANGUAGE PATHOLOGY    Lenora Rowell  11/15/2021  [phone]    Attempted to see Lenora Rowell for dysphagia follow-up. Pt on nonrebreather and O2 via NC upon SLP entering room. SLP removed nonrebreather to attempt tx and pt instantly and steadily began to desaturate. Nonrebreather was replaced and O2 sats improved. Recommend holding all PO until respiratory status is improved.      Maria Dolores French, SLP  11/15/2021  1:14 PM

## 2021-11-15 NOTE — PROGRESS NOTES
Physician Progress Note      PATIENT:               Chandrika Found  CSN #:                  [id number]  :                       1945  ADMIT DATE:       11/10/2021 3:47 PM  [address] [place] DATE:  RESPONDING  PROVIDER #:        Tamara Smith TEXT:    Dear hospitalist,    Pt admitted with COVID-19 pneumonia and noted to have elevated heart rate,   respirations, WBC, procalcitonin. If possible, please document in progress   notes and discharge summary if you are evaluating and/or treating: The medical record reflects the following:  Risk Factors: presented with SOB, chills, cough for 4-5 days, home Covid test   was positive. Clinical Indicators: VS: 11/10/2021: HR: 132, R: 26, procalcitonin 1.26, WBC   15.0, H&P: 11/10/2021: admitted for COVID-19 pneumonia,  Acute hypoxic   respiratory failure,  11/10/2021: CXR: Multifocal bilateral pneumonia in this   patient with given history of COVID-19. Treatment: Pending pulmonary consult, Meds: decadron, 1000 cc bolus IVF,   Rocephin, Zithromax,    Thank you  Aleksandr Palacio. LALA ChowdaryN, RN  Phone: [phone]  Options provided:  -- Sepsis present on admission due to COVID-19 pneumonia  -- Sepsis not present on admission due to COVID-19 pneumonia  -- Covid-19 pneumonia without sepsis  -- Other - I will add my own diagnosis  -- Disagree - Not applicable / Not valid  -- Disagree - Clinically unable to determine / Unknown  -- Refer to Clinical Documentation Reviewer    PROVIDER RESPONSE TEXT:    This patient has sepsis which was present on admission due to COVID-19   pneumonia.     Query created by: Josefa Garcia on 2021 2:49 PM      Electronically signed by:  Elie Weber 11/15/2021 3:14 AM

## 2021-11-15 NOTE — PROGRESS NOTES
drink.     ALLERGIES:  His allergies are three medications, MENTHOL, ZINC OXIDE,  LYRICA, and PRADAXA.     MEDICATIONS AT HOME:  Prior to the hospitalization, he was on Bactrim,  Percocet, Protonix, Zoloft, Desyrel, and Pravachol. Objective:   BP (!) 119/54   Pulse 80   Temp 98.6 °F (37 °C) (Axillary)   Resp 18   Ht 5' 9\" (1.753 m)   Wt 202 lb 3.2 oz (91.7 kg)   SpO2 95%   BMI 29.86 kg/m²   No intake or output data in the 24 hours ending 11/15/21 1053    Medications:   Scheduled Meds:   insulin glargine  25 Units SubCUTAneous Nightly    sodium phosphate IVPB  20 mmol IntraVENous Once    insulin lispro  0-12 Units SubCUTAneous 2 times per day    baricitinib  4 mg Oral Daily    insulin lispro  15 Units SubCUTAneous TID WC    insulin lispro  0-12 Units SubCUTAneous TID WC    amiodarone  200 mg Oral Daily    apixaban  5 mg Oral BID    metoprolol tartrate  25 mg Oral TID    guaiFENesin  600 mg Oral BID    pantoprazole  40 mg Oral Daily    pravastatin  40 mg Oral Daily    sertraline  100 mg Oral Daily    tamsulosin  0.4 mg Oral Daily    sodium chloride flush  5-40 mL IntraVENous 2 times per day    dexamethasone  6 mg Oral Daily      Infusions:   dextrose      sodium chloride        PRN Meds:  potassium chloride, glucose, dextrose, glucagon (rDNA), dextrose, sodium chloride flush, sodium chloride, ondansetron **OR** ondansetron, polyethylene glycol, acetaminophen **OR** acetaminophen, potassium chloride **OR** potassium alternative oral replacement **OR** potassium chloride       Physical Exam:  Vitals:    11/15/21 0749   BP: (!) 119/54   Pulse: 80   Resp: 18   Temp: 98.6 °F (37 °C)   SpO2: 95%        General: Will look talked to; Left sided weakness  Chest: Nontender  Cardiac: First and Second Heart Sounds are Normal, No Murmurs or Gallops noted  Lungs:Clear to auscultation and percussion.   Abdomen: Soft, NT, ND, +BS  Extremities: No clubbing, no edema  Vascular:  Equal 2+ peripheral pulses. Lab Data:  CBC:   Recent Labs     11/14/21  0646   WBC 11.0*   HGB 14.3   HCT 43.2   MCV 91.5        BMP:   Recent Labs     11/13/21  0600 22/00/53  6805   NA DUPLICATE ORDER 411   K DUPLICATE ORDER 2.5*   CL DUPLICATE ORDER 226   CO2 DUPLICATE ORDER 26   PHOS  --  1.4*   BUN DUPLICATE ORDER 25*   CREATININE DUPLICATE ORDER 0.4*     LIVER PROFILE:   Recent Labs     11/14/21  1056   AST 36   ALT 21   BILIDIR 0.3   BILITOT 0.7   ALKPHOS 85     PT/INR: No results for input(s): PROTIME, INR in the last 72 hours. APTT: No results for input(s): APTT in the last 72 hours. BNP:  No results for input(s): BNP in the last 72 hours.       Assessment:  Patient Active Problem List    Diagnosis Date Noted    Cerebral infarction ([facility] 03/21/2012    Occlusion and stenosis of carotid artery 03/24/2012    HTN (hypertension) 03/21/2012    DM (diabetes mellitus) with complications ([address] 40/25/3597    CAD (coronary artery disease) 03/21/2012    Hypercholesteremia 03/21/2012    COVID-19 11/10/2021    Diabetic ulcer of left foot associated with diabetes mellitus due to underlying condition, with fat layer exposed ([address] 07/13/2021    WD-Osteomyelitis of great toe of left foot ([facility] 05/04/2021    Sepsis ([facility] 07/11/2016    Cholecystitis 03/29/2015    Pneumonia due to organism 02/18/2015    Leukocytosis 02/18/2015    Stroke, acute, within 8 weeks 03/29/2012    Hemiparesis, left ([address] 03/29/2012    Dysphagia 03/29/2012    Aphasia 03/29/2012       Electronically signed by TIFFANIE Mayberry - CNP on 11/15/2021 at 10:53 AM   .  Electronically signed by Nazanin Quinn MD on 11/15/21 at 11:20 AM EST

## 2021-11-15 NOTE — PROGRESS NOTES
pulmonary      SUBJECTIVE: he remains on high fio2 with high flow mask     OBJECTIVE    VITALS:  BP (!) 132/50   Pulse 94   Temp 97.6 °F (36.4 °C) (Axillary)   Resp 21   Ht 5' 9\" (1.753 m)   Wt 202 lb 3.2 oz (91.7 kg)   SpO2 93%   BMI 29.86 kg/m²   HEAD AND FACE EXAM:  No throat injection, no active exudate,no thrush  NECK EXAM;No JVD, no masses, symmetrical  CHEST EXAM; Expansion equal and symmetrical, no masses  LUNG EXAM; Good breath sounds bilaterally.  There are expiratory wheezes both lungs, there are crackles at both lung bases  CARDIOVASCULAR EXAM: Positive S1 and S2, no S3 or S4, no clicks ,no murmurs  RIGHT AND LEFT LOWER EXTRIMITY EXAM: No edema, no swelling, no inflamation            LABS   Lab Results   Component Value Date    WBC 11.0 (H) 11/14/2021    HGB 14.3 11/14/2021    HCT 43.2 11/14/2021    MCV 91.5 11/14/2021     11/14/2021     Lab Results   Component Value Date    CREATININE 0.4 (L) 11/14/2021    BUN 25 (H) 11/14/2021     11/14/2021    K 2.5 (LL) 11/14/2021     11/14/2021    CO2 26 11/14/2021     Lab Results   Component Value Date    INR 0.92 11/11/2021    PROTIME 11.8 11/11/2021          Lab Results   Component Value Date    PHOS 1.4 11/14/2021    PHOS 2.2 10/18/2017    PHOS 2.8 07/11/2016        Recent Labs     11/13/21  0915   PH 7.47*   PO2ART 65*   QZY2DBU 40.0   O2SAT 93.2*         Wt Readings from Last 3 Encounters:   11/12/21 202 lb 3.2 oz (91.7 kg)   07/09/21 205 lb (93 kg)   10/18/17 184 lb 14.4 oz (83.9 kg)          EXAMINATION:   ONE XRAY VIEW OF THE CHEST       11/14/2021 10:17 am       COMPARISON:   11/10/2021       HISTORY:   ORDERING SYSTEM PROVIDED HISTORY: Rule out CHF   TECHNOLOGIST PROVIDED HISTORY:   Reason for exam:->Rule out CHF   Reason for Exam: Rule out CHF       FINDINGS:   Status post median sternotomy.  Cardiomegaly again noted.  Bilateral   pulmonary opacities persist, slightly decreased.  No pneumothorax.           Impression   Slightly

## 2021-11-16 PROBLEM — I48.91 ATRIAL FIBRILLATION WITH RVR (HCC): Status: ACTIVE | Noted: 2021-01-01

## 2021-11-16 PROBLEM — E11.9 TYPE 2 DIABETES MELLITUS (HCC): Status: ACTIVE | Noted: 2021-01-01

## 2021-11-16 PROBLEM — E63.9 INADEQUATE NUTRITION: Status: ACTIVE | Noted: 2021-01-01

## 2021-11-16 PROBLEM — G93.41 ACUTE METABOLIC ENCEPHALOPATHY: Status: ACTIVE | Noted: 2021-01-01

## 2021-11-16 PROBLEM — E87.6 HYPOKALEMIA: Status: ACTIVE | Noted: 2021-01-01

## 2021-11-16 PROBLEM — A41.9 SEPTIC SHOCK (HCC): Status: ACTIVE | Noted: 2021-01-01

## 2021-11-16 PROBLEM — I42.9 CARDIOMYOPATHY (HCC): Status: ACTIVE | Noted: 2021-01-01

## 2021-11-16 PROBLEM — J12.82 PNEUMONIA DUE TO COVID-19 VIRUS: Status: ACTIVE | Noted: 2021-01-01

## 2021-11-16 PROBLEM — R65.21 SEPTIC SHOCK (HCC): Status: ACTIVE | Noted: 2021-01-01

## 2021-11-16 PROBLEM — J96.01 ACUTE RESPIRATORY FAILURE WITH HYPOXIA (HCC): Status: ACTIVE | Noted: 2021-01-01

## 2021-11-16 NOTE — PLAN OF CARE
Nutrition Problem #1: Inadequate oral intake  Intervention: Food and/or Nutrient Delivery: Continue Current Diet, Modify Oral Nutrition Supplement  Nutritional Goals: Pt will be able to meet greater than 50% of estimated energy needs via po intake or EN will be initiated to provide adequate nutrition

## 2021-11-16 NOTE — PROGRESS NOTES
Progress Note( Dr. Al Pitts)  11/16/2021  Subjective:   Admit Date: 11/10/2021  PCP: Greg Marcos MD    Admitted For :Shortness of breath hypoxia on home pulse oximeter  Patient is nonverbal    Consulted For:  Better control of blood glucoseHistory:    Interval history :appears somewhat worse patient is non verbal still  Patient was placed on BiPAP for better oxygen saturation    Denies any chest pains,   Yes SOB . On BiPAP now   denies nausea or vomiting. Able to swallow and eating lunch when he was fed by nurse  No new bowel or bladder symptoms. Intake/Output Summary (Last 24 hours) at 11/16/2021 0603  Last data filed at 11/16/2021 0530  Gross per 24 hour   Intake 680 ml   Output 800 ml   Net -120 ml       DATA    CBC:   Recent Labs     11/14/21  0646   WBC 11.0*   HGB 14.3       CMP:  Recent Labs     11/14/21  1056      K 2.5*      CO2 26   BUN 25*   CREATININE 0.4*   CALCIUM 7.8*   PROT 5.4*   LABALBU 2.6*  2.6*   BILITOT 0.7   ALKPHOS 85   AST 36   ALT 21     Lipids:   Lab Results   Component Value Date    CHOL 159 07/13/2020    HDL 41 07/13/2020    TRIG 125 07/13/2020     Glucose:  Recent Labs     11/15/21  1641 11/15/21  2017 11/16/21  0149   POCGLU 196* 262* 153*     [id number]:  Lab Results   Component Value Date    LABA1C 9.2 11/12/2021     High Sensitivity TSH:   Lab Results   Component Value Date    TSHHS 1.890 11/10/2021     Free T3: No results found for: FT3  Free T4:  Lab Results   Component Value Date    T4FREE 1.09 11/10/2021       XR CHEST PORTABLE   Final Result   Slightly decreased but persistent pulmonary opacities could represent edema   or multifocal pneumonia         CT HEAD WO CONTRAST   Final Result   No acute intracranial abnormality. Large old right MCA infarct severe chronic microvascular disease within the   periventricular         XR CHEST PORTABLE   Final Result   1. Multifocal bilateral pneumonia in this patient with given history of   COVID-19. Scheduled Medicines   Medications:    insulin glargine  25 Units SubCUTAneous Nightly    lisinopril  2.5 mg Oral Daily    insulin lispro  0-12 Units SubCUTAneous 2 times per day    baricitinib  4 mg Oral Daily    insulin lispro  15 Units SubCUTAneous TID WC    insulin lispro  0-12 Units SubCUTAneous TID WC    amiodarone  200 mg Oral Daily    apixaban  5 mg Oral BID    metoprolol tartrate  25 mg Oral TID    guaiFENesin  600 mg Oral BID    pantoprazole  40 mg Oral Daily    pravastatin  40 mg Oral Daily    sertraline  100 mg Oral Daily    tamsulosin  0.4 mg Oral Daily    sodium chloride flush  5-40 mL IntraVENous 2 times per day    dexamethasone  6 mg Oral Daily      Infusions:    dextrose      sodium chloride           Objective:   Vitals: /60   Pulse 85   Temp 97.9 °F (36.6 °C) (Axillary)   Resp 24   Ht 5' 9\" (1.753 m)   Wt 202 lb 3.2 oz (91.7 kg)   SpO2 92%   BMI 29.86 kg/m²   General appearance: alert and cooperative with exam nonverbal  Neck: no JVD or bruit  Thyroid : Normal lobes   Lungs: Has Vesicular Breath sounds   Heart:  regular rate and rhythm  Abdomen: soft, non-tender; bowel sounds normal; no masses,  no organomegaly  Musculoskeletal: Normal  Extremities: extremities normal, , no edema  Neurologic:  Awake, alert, oriented to name, place and time. Cranial nerves II-XII are grossly intact. Except nonverbal motor weakness. Sensory is intact. ,  and gait is abnormal.  Appears to be bed ridden CVA    Assessment:     Patient Active Problem List:     Cerebral infarction ([facility]     HTN (hypertension)     DM (diabetes mellitus) with complications (HCC)     Hypercholesteremia     CAD (coronary artery disease)     Occlusion and stenosis of carotid artery     Stroke, acute, within 8 weeks     Hemiparesis, left (HCC)     Dysphagia     Aphasia     Pneumonia due to organism     Leukocytosis     Cholecystitis     Sepsis ([facility]     WD-Osteomyelitis of great toe of left foot

## 2021-11-16 NOTE — PROGRESS NOTES
Patient family member called in for update. Advised patient had a good day, ate two full meals today and did not eat much dinner. Was able to get to patient to speak a res and a no to a few of my question, he knows who he is but not where he is at.

## 2021-11-16 NOTE — PROGRESS NOTES
Progress Note( Dr. Elly Crouch)  11/15/2021  Subjective:   Admit Date: 11/10/2021  PCP: Leonarda Biggs MD    Admitted For :Shortness of breath hypoxia on home pulse oximeter  Patient is nonverbal    Consulted For:  Better control of blood glucoseHistory:    Interval history :appears somewhat better patient is non verbal still      Denies any chest pains,   Yes SOB . On rebreathing mask  Denies nausea or vomiting. Able to swallow and eating lunch when he was fed by nurse  No new bowel or bladder symptoms. Intake/Output Summary (Last 24 hours) at 11/15/2021 2227  Last data filed at 11/15/2021 2131  Gross per 24 hour   Intake 680 ml   Output --   Net 680 ml       DATA    CBC:   Recent Labs     11/14/21  0646   WBC 11.0*   HGB 14.3       CMP:  Recent Labs     11/13/21  0600 26/15/06  9831   NA DUPLICATE ORDER 849   K DUPLICATE ORDER 2.5*   CL DUPLICATE ORDER 041   CO2 DUPLICATE ORDER 26   BUN DUPLICATE ORDER 25*   CREATININE DUPLICATE ORDER 0.4*   CALCIUM DUPLICATE ORDER 7.8*   PROT  --  5.4*   LABALBU  --  2.6*  2.6*   BILITOT  --  0.7   ALKPHOS  --  85   AST  --  36   ALT  --  21     Lipids:   Lab Results   Component Value Date    CHOL 159 07/13/2020    HDL 41 07/13/2020    TRIG 125 07/13/2020     Glucose:  Recent Labs     11/15/21  1202 11/15/21  1641 11/15/21  2017   POCGLU 91 196* 262*     QzeqzemjveD3C:  Lab Results   Component Value Date    LABA1C 9.2 11/12/2021     High Sensitivity TSH:   Lab Results   Component Value Date    TSHHS 1.890 11/10/2021     Free T3: No results found for: FT3  Free T4:  Lab Results   Component Value Date    T4FREE 1.09 11/10/2021       XR CHEST PORTABLE   Final Result   Slightly decreased but persistent pulmonary opacities could represent edema   or multifocal pneumonia         CT HEAD WO CONTRAST   Final Result   No acute intracranial abnormality.       Large old right MCA infarct severe chronic microvascular disease within the   periventricular         XR CHEST PORTABLE Final Result   1. Multifocal bilateral pneumonia in this patient with given history of   COVID-19. Scheduled Medicines   Medications:    insulin glargine  25 Units SubCUTAneous Nightly    lisinopril  2.5 mg Oral Daily    insulin lispro  0-12 Units SubCUTAneous 2 times per day    baricitinib  4 mg Oral Daily    insulin lispro  15 Units SubCUTAneous TID WC    insulin lispro  0-12 Units SubCUTAneous TID WC    amiodarone  200 mg Oral Daily    apixaban  5 mg Oral BID    metoprolol tartrate  25 mg Oral TID    guaiFENesin  600 mg Oral BID    pantoprazole  40 mg Oral Daily    pravastatin  40 mg Oral Daily    sertraline  100 mg Oral Daily    tamsulosin  0.4 mg Oral Daily    sodium chloride flush  5-40 mL IntraVENous 2 times per day    dexamethasone  6 mg Oral Daily      Infusions:    dextrose      sodium chloride           Objective:   Vitals: BP (!) 122/45   Pulse 78   Temp 97.8 °F (36.6 °C) (Axillary)   Resp 14   Ht 5' 9\" (1.753 m)   Wt 202 lb 3.2 oz (91.7 kg)   SpO2 99%   BMI 29.86 kg/m²   General appearance: alert and cooperative with exam nonverbal  Neck: no JVD or bruit  Thyroid : Normal lobes   Lungs: Has Vesicular Breath sounds   Heart:  regular rate and rhythm  Abdomen: soft, non-tender; bowel sounds normal; no masses,  no organomegaly  Musculoskeletal: Normal  Extremities: extremities normal, , no edema  Neurologic:  Awake, alert, oriented to name, place and time. Cranial nerves II-XII are grossly intact. Except nonverbal motor weakness. Sensory is intact. ,  and gait is abnormal.  Appears to be bed ridden CVA    Assessment:     Patient Active Problem List:     Cerebral infarction ([address]     HTN (hypertension)     DM (diabetes mellitus) with complications (HCC)     Hypercholesteremia     CAD (coronary artery disease)     Occlusion and stenosis of carotid artery     Stroke, acute, within 8 weeks     Hemiparesis, left ([address]     Dysphagia     Aphasia     Pneumonia due to organism     Leukocytosis     Cholecystitis     Sepsis ([facility] [address]     WD-Osteomyelitis of great toe of left foot ([facility] [address]     Diabetic ulcer of left foot associated with diabetes mellitus due to underlying condition, with fat layer exposed [facility]     COVID-19      Plan:     1. Reviewed POC blood glucose . Labs and X ray results   2. Reviewed Current Medicines   3. On Correction bolus Humalog/ Basal Lantus Insulin regime   4. Monitor Blood glucose frequently   5. Modified  the dose of Insulin/ other medicines as needed   6. Will follow     .      Sujit Arreola MD, MD

## 2021-11-16 NOTE — PROGRESS NOTES
Comprehensive Nutrition Assessment    Type and Reason for Visit:  Reassess    Nutrition Recommendations/Plan:   · Add chocolate fortified pudding and frozen oral nutrition supplements  · If pt unable to meet greater than 50% of estimated energy needs via po intake, recommend EN will be initiated to provide adequate nutrition  · Consult dietitian for tube feed order and manage if EN is initiated  · Will closely monitor po intake, nutrition status, poc    Nutrition Assessment:  pt on heated high flow cannula 95% FiO2, pt remains in pureed diet with nectar thickened liquids and diabetic oral nurition supplement, varied po intake per documentation over the past 72 hr (0-100%), noted SLP unable to work with pt yesterday d/t desaturation without nonbreather,  will continue to follow pt at high nutrition risk    Malnutrition Assessment:  Malnutrition Status:  Insufficient data    Context:  Chronic Illness       Estimated Daily Nutrient Needs:  Energy (kcal):  5791-0666 ([address] w/ stress factor 1.0-1.2); Weight Used for Energy Requirements:  Current     Protein (g):  73-88 (1.0-1.2 g/kg); Weight Used for Protein Requirements:  Ideal        Fluid (ml/day):  0028-0206; Method Used for Fluid Requirements:  1 ml/kcal      Nutrition Related Findings:  Meds: Decadron      Wounds:   (left lateral foot wound noted)       Current Nutrition Therapies:    ADULT DIET; Dysphagia - Pureed; Mildly Thick (Nectar); Likes chocolate flavors  ADULT ORAL NUTRITION SUPPLEMENT; Lunch; Diabetic Oral Supplement    Anthropometric Measures:  · Height: 5' 9.02\" (175.3 cm)  · Current Body Weight: 202 lb 2.6 oz (91.7 kg)   · Admission Body Weight:  (n/a)    · Usual Body Weight: 205 lb (93 kg) (7/9/21)     · Ideal Body Weight: 160 lbs; % Ideal Body Weight 126.4 %   · BMI: 29.8  · BMI Categories: Overweight (BMI 25.0-29. 9)       Nutrition Diagnosis:   · Inadequate oral intake related to impaired respiratory function, acute injury/trauma as evidenced by varied po intake documented in the past 72 hr    Nutrition Interventions:   Food and/or Nutrient Delivery:  Continue Current Diet, Modify Oral Nutrition Supplement  Nutrition Education/Counseling:  No recommendation at this time   Coordination of Nutrition Care:  Continue to monitor while inpatient    Goals:  Pt will be able to meet greater than 50% of estimated energy needs via po intake or EN will be initiated to provide adequate nutrition       Nutrition Monitoring and Evaluation:   Behavioral-Environmental Outcomes:  None Identified   Food/Nutrient Intake Outcomes:  Food and Nutrient Intake, Supplement Intake  Physical Signs/Symptoms Outcomes:  Biochemical Data, GI Status, Hemodynamic Status, Fluid Status or Edema, Weight, Skin     Discharge Planning:     Too soon to determine     Electronically signed by Constantino Ragsdale MS, RD, LD on 11/16/21 at 11:49 AM EST    Contact: [phone]

## 2021-11-16 NOTE — PROGRESS NOTES
pulmonary      SUBJECTIVE: on airvo with 90% fio2     OBJECTIVE    VITALS:  /66   Pulse 98   Temp 97.9 °F (36.6 °C) (Axillary)   Resp 18   Ht 5' 9\" (1.753 m)   Wt 202 lb 3.2 oz (91.7 kg)   SpO2 93%   BMI 29.86 kg/m²   HEAD AND FACE EXAM:  No throat injection, no active exudate,no thrush  NECK EXAM;No JVD, no masses, symmetrical  CHEST EXAM; Expansion equal and symmetrical, no masses  LUNG EXAM; Good breath sounds bilaterally. There are expiratory wheezes both lungs, there are crackles at both lung bases  CARDIOVASCULAR EXAM: Positive S1 and S2, no S3 or S4, no clicks ,no murmurs  RIGHT AND LEFT LOWER EXTRIMITY EXAM: No edema, no swelling, no inflamation            LABS   Lab Results   Component Value Date    WBC 11.0 (H) 11/14/2021    HGB 14.3 11/14/2021    HCT 43.2 11/14/2021    MCV 91.5 11/14/2021     11/14/2021     Lab Results   Component Value Date    CREATININE 0.4 (L) 11/14/2021    BUN 25 (H) 11/14/2021     11/14/2021    K 2.5 (LL) 11/14/2021     11/14/2021    CO2 26 11/14/2021     Lab Results   Component Value Date    INR 0.92 11/11/2021    PROTIME 11.8 11/11/2021          Lab Results   Component Value Date    PHOS 1.4 11/14/2021    PHOS 2.2 10/18/2017    PHOS 2.8 07/11/2016        Recent Labs     11/13/21  0915   PH 7.47*   PO2ART 65*   QGM5UPZ 40.0   O2SAT 93.2*         Wt Readings from Last 3 Encounters:   11/12/21 202 lb 3.2 oz (91.7 kg)   07/09/21 205 lb (93 kg)   10/18/17 184 lb 14.4 oz (83.9 kg)               ASSESMENT  Ac resp failure  covid pneumonia  Ac bronchospasm        PLAN  1. Cont airvo  2. On decadron  3.  Cont baricitinab    11/16/2021  Rosi Ramos MD, M.D.

## 2021-11-16 NOTE — PROGRESS NOTES
Daily Progress Note    Pt. Awake, not in any acute distress  HR stable, BP stable   need to check labs =-no labs for two days  Ordered labs not done yet  afib rate control and AC   Need chest xray also  Overall prognosis is guarded  He is on high flow oxygen making a very slow progress  HFrEF max medical treatment  Hx of CAD and CABG     Covid 19 pneumonia     On decadron and baricitinib    Treatment per primary care    Pulmonology following-appears stable overall- sating in the low 90's     Afib with RVR    New onset    Amio, metoprolol and eliquis started    rate controlled    replaced electrolytes      Cardiomyopathy    EF 30%, last echo was 50% in 2015    BNP trending down; 1907 yesterday     Will work on work up once recover from infection    Will also optimize medications at that time.      Hx of CVA; left sided deficients  Will follow      Echo 11/11/2021 Summary   Expedited imaging was used to obtain protocol images to reduce the   sonographer's exposure during COVID-19 pandemic.   Left ventricular systolic function is abnormal.   Ejection fraction is visually estimated at 30%.  Very frequent PVC's (up to 10 in a row noted).    Mild left ventricular hypertrophy.   Grade I diastolic dysfunction.   Sclerotic, but non-stenotic aortic valve.   Mild mitral and tricuspid regurgitation.   No evidence of any pericardial effusion      ACE/ ARB: Yes    Can this be changed to ARNI: No, d/t HOTN    B-blocker Yes    Persistently symptomatic AA with NYHA class III-IV  EF < 35 despite being on ACE/ ARB/ARNI: No  hydralazine + Nitrate No    Loop Diuretic No    NYHA class II-IV with eGFR >30/mil/min/173.m2 and K <5.0 mEq/l   mineralcorctcoid receptor antagonist (aldactone/ eplerenone) in addition to ACE or ARB and in conjunction with B blocker  No, d/t HOTN    HR greater than 70 with patient with LVEF  < 35 Yes  Able to use Ivabradine No, d/t AF    PAST MEDICAL HISTORY:  History of a stroke, left-sided.  History of  hypertension, hyperlipidemia, diabetes, coronary artery disease status  post 5-vessel bypass surgery done.  I do not know his graft.  Dysphagia,  pneumonia, UTI, kidney stones, and cholecystitis present.     PAST SURGICAL HISTORY:  History of a 5-vessel bypass surgery done. History of gallbladder removed, and also I think he has a partial  pancreatectomy done.     SOCIAL HISTORY:  He does not smoke and does not drink.     ALLERGIES:  His allergies are three medications, MENTHOL, ZINC OXIDE,  LYRICA, and PRADAXA.     MEDICATIONS AT HOME:  Prior to the hospitalization, he was on Bactrim,  Percocet, Protonix, Zoloft, Desyrel, and Pravachol.       Objective:   /66   Pulse 98   Temp 97.9 °F (36.6 °C) (Axillary)   Resp 18   Ht 5' 9\" (1.753 m)   Wt 202 lb 3.2 oz (91.7 kg)   SpO2 93%   BMI 29.86 kg/m²     Intake/Output Summary (Last 24 hours) at 11/16/2021 1049  Last data filed at 11/16/2021 0530  Gross per 24 hour   Intake 440 ml   Output 800 ml   Net -360 ml       Medications:   Scheduled Meds:   insulin glargine  25 Units SubCUTAneous Nightly    lisinopril  2.5 mg Oral Daily    insulin lispro  0-12 Units SubCUTAneous 2 times per day    baricitinib  4 mg Oral Daily    insulin lispro  15 Units SubCUTAneous TID WC    insulin lispro  0-12 Units SubCUTAneous TID WC    amiodarone  200 mg Oral Daily    apixaban  5 mg Oral BID    metoprolol tartrate  25 mg Oral TID    guaiFENesin  600 mg Oral BID    pantoprazole  40 mg Oral Daily    pravastatin  40 mg Oral Daily    sertraline  100 mg Oral Daily    tamsulosin  0.4 mg Oral Daily    sodium chloride flush  5-40 mL IntraVENous 2 times per day    dexamethasone  6 mg Oral Daily      Infusions:   dextrose      sodium chloride        PRN Meds:  potassium chloride, glucose, dextrose, glucagon (rDNA), dextrose, sodium chloride flush, sodium chloride, ondansetron **OR** ondansetron, polyethylene glycol, acetaminophen **OR** acetaminophen, potassium chloride **OR** potassium alternative oral replacement **OR** potassium chloride       Physical Exam:  Vitals:    11/16/21 0635   BP:    Pulse:    Resp:    Temp:    SpO2: 93%        General: AAO, NAD  Chest: Nontender  Cardiac: First and Second Heart Sounds are Normal, No Murmurs or Gallops noted  Lungs:Clear to auscultation and percussion. Abdomen: Soft, NT, ND, +BS  Extremities: No clubbing, no edema  Vascular:  Equal 2+ peripheral pulses. Lab Data:  CBC:   Recent Labs     11/14/21  0646   WBC 11.0*   HGB 14.3   HCT 43.2   MCV 91.5        BMP:   Recent Labs     11/14/21  1056      K 2.5*      CO2 26   PHOS 1.4*   BUN 25*   CREATININE 0.4*     LIVER PROFILE:   Recent Labs     11/14/21  1056   AST 36   ALT 21   BILIDIR 0.3   BILITOT 0.7   ALKPHOS 85     PT/INR: No results for input(s): PROTIME, INR in the last 72 hours. APTT: No results for input(s): APTT in the last 72 hours. BNP:  No results for input(s): BNP in the last 72 hours.       Assessment:  Patient Active Problem List    Diagnosis Date Noted    Cerebral infarction ([facility] 03/21/2012    Occlusion and stenosis of carotid artery 03/24/2012    HTN (hypertension) 03/21/2012    DM (diabetes mellitus) with complications ([address] 83/47/8759    CAD (coronary artery disease) 03/21/2012    Hypercholesteremia 03/21/2012    COVID-19 11/10/2021    Diabetic ulcer of left foot associated with diabetes mellitus due to underlying condition, with fat layer exposed ([address] 07/13/2021    WD-Osteomyelitis of great toe of left foot ([address] 05/04/2021    Sepsis ([facility] 07/11/2016    Cholecystitis 03/29/2015    Pneumonia due to organism 02/18/2015    Leukocytosis 02/18/2015    Stroke, acute, within 8 weeks 03/29/2012    Hemiparesis, left ([address] 03/29/2012    Dysphagia 03/29/2012    Aphasia 03/29/2012       Electronically signed by Fredy Hankins PA-C on 11/16/2021 at 10:49 AM    Electronically signed by Bernadine Bernabe MD on 11/16/21 at 11:27 AM EST

## 2021-11-16 NOTE — PROGRESS NOTES
Hospitalist Progress Note      PCP: Farhad Erickson MD    Date of Admission: 11/10/2021    LOS: 6      Case Summary:   53-year-old gentleman with history of CAD, type 2 diabetes, GERD, hyperlipidemia, hypertension who presented with 2-day history of worsening shortness of breath and found to have COVID-19 pneumonia with acute respiratory failure and hypoxia complicated by atrial fibrillation with rapid ventricular response, acute metabolic encephalopathy. CT brain done 11/10/2021 noted for large old right MCA stroke with severe chronic microvascular disease within periventricular area. Active Hospital Problems    Diagnosis Date Noted    HTN (hypertension) [I10] 03/21/2012     Priority: Medium    Acute respiratory failure with hypoxia ([address] [J96.01] 11/16/2021    Atrial fibrillation with RVR ([address] [I48.91] 11/16/2021    Inadequate nutrition [E63.9] 11/16/2021    Septic shock ([address] [A41.9, R65.21] 11/16/2021    Acute metabolic encephalopathy [E71.67] 11/16/2021    Type 2 diabetes mellitus ([address] [E11.9] 11/16/2021    Hypokalemia [E87.6] 11/16/2021    Cardiomyopathy ([address] [I42.9] 11/16/2021    Pneumonia due to COVID-19 virus [U07.1, J12.82] 11/10/2021         Principal Problem:    Pneumonia due to COVID-19 virus and Acute respiratory failure with hypoxia: Remains on heated high flow nasal cannula at 100% with 55 L with saturation ranging 88 to 90%. -Continue on baricitinib  -Continue on Decadron  -Continue O2 supplementation with target sats greater than 90% and wean off as tolerated      Atrial fibrillation with RVR ([address]): New onset. Patient's was started on rate control medication with improvement. Echocardiogram 11/11/2021 with a EF of 80% and grade 1 diastolic dysfunction.  -Continue amiodarone, metoprolol and Eliquis  -Cardiology following with recommendation      Cardiomyopathy: In the setting of recent Covid infection and RVR. EF was 50% on echo of 2015.   Echo 11/11/2021 noted with EF of 30%, mild LVH and grade 1 diastolic dysfunction. BNP elevated on admission trending down.  -Cardiology following with plans for work-up once acute illness recovered. Inadequate nutrition: Continue on puréed solids/nectar thick liquids with aspiration precautions. Nutrition is following. Noted inadequate oral intake. If persistent in the next 24 to 48 hours, to consider tube feeds    Active Problems:    HTN (hypertension): Stable on metoprolol    Acute metabolic encephalopathy: In the setting of acute illness. CT brain with no acute pathology. Noted chronic MCA stroke. Remains pleasantly confused. Type 2 diabetes mellitus ([facility]): On sliding scale insulin    Hypokalemia:  We will replete and monitor      Resolved Problems:    Septic shock (HCC)              Medications:  Reviewed  Infusion Medications    dextrose      sodium chloride       Scheduled Medications    insulin glargine  25 Units SubCUTAneous Nightly    lisinopril  2.5 mg Oral Daily    insulin lispro  0-12 Units SubCUTAneous 2 times per day    baricitinib  4 mg Oral Daily    insulin lispro  15 Units SubCUTAneous TID WC    insulin lispro  0-12 Units SubCUTAneous TID WC    amiodarone  200 mg Oral Daily    apixaban  5 mg Oral BID    metoprolol tartrate  25 mg Oral TID    guaiFENesin  600 mg Oral BID    pantoprazole  40 mg Oral Daily    pravastatin  40 mg Oral Daily    sertraline  100 mg Oral Daily    tamsulosin  0.4 mg Oral Daily    sodium chloride flush  5-40 mL IntraVENous 2 times per day    dexamethasone  6 mg Oral Daily     PRN Meds: potassium chloride, glucose, dextrose, glucagon (rDNA), dextrose, sodium chloride flush, sodium chloride, ondansetron **OR** ondansetron, polyethylene glycol, acetaminophen **OR** acetaminophen, potassium chloride **OR** potassium alternative oral replacement **OR** potassium chloride      DVT Prophylaxis: Eliquis  Diet: ADULT ORAL NUTRITION SUPPLEMENT; Lunch, Breakfast; Fortified Pudding Oral Final Result   Slightly decreased but persistent pulmonary opacities could represent edema   or multifocal pneumonia         CT HEAD WO CONTRAST   Final Result   No acute intracranial abnormality. Large old right MCA infarct severe chronic microvascular disease within the   periventricular         XR CHEST PORTABLE   Final Result   1. Multifocal bilateral pneumonia in this patient with given history of   COVID-19. XR CHEST PORTABLE    (Results Pending)           Rick Wick MD      Please excuse brevity and/or typos. This report was transcribed using voice recognition software. Every effort was made to ensure accuracy, however, inadvertent computerized transcription errors may be present.

## 2021-11-16 NOTE — PROGRESS NOTES
[id number] OF SPEECH/LANGUAGE PATHOLOGY  DAILY PROGRESS NOTE  Debbie Lopez  11/16/2021  [id number]  Atrial fibrillation with RVR ([address] [I48.91]  Acute respiratory failure with hypoxia ([facility]) [J96.01]  COVID-19 [U07.1]  Allergies   Allergen Reactions    Calmoseptine [Menthol-Zinc Oxide]     Lyrica [Pregabalin] Other (See Comments)     Eyes cross    Pradaxa [Dabigatran Etexilate Mesylate] Other (See Comments)     Headache           Pt was seen this date for dysphagia treatment. IMPRESSION AND RECOMMENDATIONS: Debbie Lopez was seen for dysphagia follow-up. He was seated upright in bed, alert, requiring cues for participation. Noted he is now on Airvo, 50L 100% FiO2 during today's session. He did not follow commands to complete oral mechanism examination. He was presented with PO trials of nectar thick liquids, honey thick liquids, puree and soft solids. Oral stage moderately impaired, characterized by reduced labial seal and inconsistent anterior loss, reduced buccal strength/coordination to draw liquid through straw, minimal lingual coordination, prolonged mastication/reduced bolus formation, and suspected premature pharyngeal entry with all liquids. Significant oral residue noted with soft solids. Pooling of secretions also noted along gumline with anterior loss, inconsistently. Suspect moderate pharyngeal dysphagia characterized by variable/delayed swallow initiation, reduced laryngeal elevation. Strong persistent wet coughing followed trial of nectar thick liquids x1. Recommend downgrade to honey thick liquids, continued pureed diet. Pt is a total feed. Follow strict aspiration precautions. SLP will follow.     GOALS (current status in bold):  Short-term Goals  Timeframe for Short-term Goals: LOS or until goals are met  Goal 1: Pt will tolerate puree diet/nectar thick liquids without clinical evidence of aspiration 100% Not met, downgrade to honey thick liquids/continue pureed diet  Goal 2: Pt will participate in advanced trials for possible safe diet level advancement 10/10 trials Not met, continue  Goal 3: Pt/caregivers will demonstrate comprehension of recommendations/POC Ongoing, continue      EDUCATION: recommendations/plan    PAIN RATING (0-10 Scale): does not state/appears comfortable  Time in/Time out: SLP Individual Minutes  Time In: 1125  Time Out: 1150  Minutes: 25    Visit number: [id number] [facility]  11/16/2021  11:55 AM

## 2021-11-17 NOTE — CONSULTS
Consult completed without success. Vessel accessed successfully, but line would not advance past shoulder and into subclavian space despite extensive troubleshooting. Amarjit Diaz, primary RN notified and will notify the doctor. Order entered for IR to place line per protocol.

## 2021-11-17 NOTE — PROGRESS NOTES
Non-invasive ventilation intitiated, Ipap 14, epap 8 @ 40%. Patient was maxed out on the heated high flow.

## 2021-11-17 NOTE — PROGRESS NOTES
Speech Language Pathology  [facility]  DEPARTMENT OF SPEECH/LANGUAGE PATHOLOGY  DAILY PROGRESS NOTE  Niall Hager  11/17/2021  [id number]  Atrial fibrillation with RVR ([address] [I48.91]  Acute respiratory failure with hypoxia (HCC) [J96.01]  COVID-19 [U07.1]  Allergies   Allergen Reactions    Calmoseptine [Menthol-Zinc Oxide]     Lyrica [Pregabalin] Other (See Comments)     Eyes cross    Pradaxa [Dabigatran Etexilate Mesylate] Other (See Comments)     Headache           Pt was seen this date for dysphagia treatment. IMPRESSION AND RECOMMENDATIONS:   Pt was seen this date for diet tolerance monitoring. Per/chart review, poor PO intake, nsg reports taking puree and honey thick liquids in small amounts. Pt was seen on the Covid unit, on HFNC satting at 89-90%. He was lethargic, appeared in no distress and was amenable to PO trials of pureed and honey thick liquids by cup and spoon. Improved oral phase with normal lip seal on cup edge, no anterior loss, slow a-p transit vs momentary oral holding and normal clearance for textures presented. Pharyngeal delay with suspected premature pharyngeal entry and no overt s/s aspiration across all trials. DNT trials for advancement due to lethargy and O2 requirement. Recommend: Continue Puree/honey thick by spoon/cup. Will follow for safe tolerance and trials for advancement.         GOALS (current status in bold):  Short-term Goals  Timeframe for Short-term Goals: LOS or until goals are met  Goal 1: Pt will tolerate puree diet/honey thick liquids by spoon or cup without clinical evidence of aspiration 100% Meeting, Continue per/nsg  Goal 2: Pt will participate in advanced trials for possible safe diet level advancement 10/10 trials DNT  Goal 3: Pt/caregivers will demonstrate comprehension of recommendations/POC Meeting, Continue      EDUCATION:  LARISA Rhodes aware of POC    PAIN RATING (0-10 Scale): appears in no distress  Time in/Time out: SLP Individual Minutes  Time In: 8178  Time Out: 52 [facility]  Minutes: 30    Visit number:       RADHA Berry  11/17/2021  3:17 PM

## 2021-11-17 NOTE — PROGRESS NOTES
Daily Progress Note    Pt. Awake, not in any acute distress-on BiPAP  HR stable, BP stable, appears to be NSR with PAC's this am     His K is being corrected and mag being corrected also  covid making a slow progress  afib -rate control for now and on AC  Prognosis is guarded   Hx of CAD and CABG   Recheck labs today     Covid 19 pneumonia     On decadron and baricitinib    Treatment per primary care    Pulmonology following-appears stable overall- sating in the low 90's on BiPAP this am     Afib with RVR    New onset    Amio, metoprolol and eliquis started    rate controlled- appeared NSR with PAC's this am- PAF    K down to 2.5 yesterday-replacing-needs rechecked today    Mag also down to 1.7    Awaiting labs today      Cardiomyopathy    EF 30%, last echo was 50% in 2015    BNP trending down    Will need ischemic w/u once recovered from infection    Will also optimize medications as able-add aldactone today as K is low     Hx of CVA  Will follow      Echo 11/11/2021 Summary   Expedited imaging was used to obtain protocol images to reduce the   sonographer's exposure during COVID-19 pandemic.   Left ventricular systolic function is abnormal.   Ejection fraction is visually estimated at 30%.  Very frequent PVC's (up to 10 in a row noted).    Mild left ventricular hypertrophy.   Grade I diastolic dysfunction.   Sclerotic, but non-stenotic aortic valve.   Mild mitral and tricuspid regurgitation.   No evidence of any pericardial effusion        ACE/ ARB: Yes    Can this be changed to ARNI: No, d/t HOTN     B-blocker Yes     Persistently symptomatic AA with NYHA class III-IV  EF < 35 despite being on ACE/ ARB/ARNI: No  hydralazine + Nitrate No     Loop Diuretic No     NYHA class II-IV with eGFR >30/mil/min/173.m2 and K <5.0 mEq/l   mineralcorctcoid receptor antagonist (aldactone/ eplerenone) in addition to ACE or ARB and in conjunction with B blocker  Yes added today     HR greater than 70 with patient with LVEF  < sodium chloride      dextrose      sodium chloride        PRN Meds:  potassium chloride **OR** potassium alternative oral replacement **OR** potassium chloride, sodium chloride flush, sodium chloride, potassium chloride, glucose, dextrose, glucagon (rDNA), dextrose, sodium chloride flush, sodium chloride, ondansetron **OR** ondansetron, polyethylene glycol, acetaminophen **OR** acetaminophen, potassium chloride **OR** potassium alternative oral replacement **OR** potassium chloride       Physical Exam:  Vitals:    11/17/21 0730   BP: 117/70   Pulse: 66   Resp: 21   Temp: 100 °F (37.8 °C)   SpO2:         General: AAO, NAD  Chest: Nontender  Cardiac: First and Second Heart Sounds are Normal, No Murmurs or Gallops noted  Lungs:Clear to auscultation and percussion. Abdomen: Soft, NT, ND, +BS  Extremities: No clubbing, no edema  Vascular:  Equal 2+ peripheral pulses. Lab Data:  CBC:   Recent Labs     11/16/21  1645   WBC 11.1*   HGB 13.4*   HCT 40.7*   MCV 89.5        BMP:   Recent Labs     11/14/21  1056 11/16/21  1645    140   K 2.5* 2.5*    101   CO2 26 25   PHOS 1.4* 2.9   BUN 25* 30*   CREATININE 0.4* 0.5*     LIVER PROFILE:   Recent Labs     11/14/21  1056 11/16/21  1645   AST 36 27   ALT 21 15   BILIDIR 0.3  --    BILITOT 0.7 0.9   ALKPHOS 85 83     PT/INR: No results for input(s): PROTIME, INR in the last 72 hours. APTT: No results for input(s): APTT in the last 72 hours. BNP:  No results for input(s): BNP in the last 72 hours.       Assessment:  Patient Active Problem List    Diagnosis Date Noted    Cerebral infarction [facility]) 03/21/2012    Occlusion and stenosis of carotid artery 03/24/2012    HTN (hypertension) 03/21/2012    DM (diabetes mellitus) with complications ([facility] 54/10/1141    CAD (coronary artery disease) 03/21/2012    Hypercholesteremia 03/21/2012    Acute respiratory failure with hypoxia ([facility] 11/16/2021    Atrial fibrillation with RVR ([address] 11/16/2021    Inadequate nutrition 11/16/2021    Septic shock ([address] 11/16/2021    Acute metabolic encephalopathy 17/45/2497    Type 2 diabetes mellitus ([address] 11/16/2021    Hypokalemia 11/16/2021    Cardiomyopathy ([address] 11/16/2021    Pneumonia due to COVID-19 virus 11/10/2021    Diabetic ulcer of left foot associated with diabetes mellitus due to underlying condition, with fat layer exposed ([address] 07/13/2021    WD-Osteomyelitis of great toe of left foot ([address] 05/04/2021    Sepsis ([address] 07/11/2016    Cholecystitis 03/29/2015    Pneumonia due to organism 02/18/2015    Leukocytosis 02/18/2015    Stroke, acute, within 8 weeks 03/29/2012    Hemiparesis, left ([address] 03/29/2012    Dysphagia 03/29/2012    Aphasia 03/29/2012       Electronically signed by Nigel Crespo PA-C on 11/17/2021 at 8:11 AM    Electronically signed by Celeste Tipton MD on 11/17/21 at 11:02 AM EST

## 2021-11-17 NOTE — PROGRESS NOTES
pulmonary      SUBJECTIVE:  Remains on airvo     OBJECTIVE    VITALS:  BP (!) 128/56   Pulse 83   Temp 99 °F (37.2 °C) (Axillary)   Resp 20   Ht 5' 9.02\" (1.753 m)   Wt 202 lb 3.2 oz (91.7 kg)   SpO2 98%   BMI 29.85 kg/m²   HEAD AND FACE EXAM:  No throat injection, no active exudate,no thrush  NECK EXAM;No JVD, no masses, symmetrical  CHEST EXAM; Expansion equal and symmetrical, no masses  LUNG EXAM; Good breath sounds bilaterally. There are expiratory wheezes both lungs, there are crackles at both lung bases  CARDIOVASCULAR EXAM: Positive S1 and S2, no S3 or S4, no clicks ,no murmurs  RIGHT AND LEFT LOWER EXTRIMITY EXAM: No edema, no swelling, no inflamation            LABS   Lab Results   Component Value Date    WBC 11.1 (H) 11/16/2021    HGB 13.4 (L) 11/16/2021    HCT 40.7 (L) 11/16/2021    MCV 89.5 11/16/2021     11/16/2021     Lab Results   Component Value Date    CREATININE 0.5 (L) 11/16/2021    BUN 30 (H) 11/16/2021     11/16/2021    K 2.5 (LL) 11/16/2021     11/16/2021    CO2 25 11/16/2021     Lab Results   Component Value Date    INR 0.92 11/11/2021    PROTIME 11.8 11/11/2021          Lab Results   Component Value Date    PHOS 2.9 11/16/2021    PHOS 1.4 11/14/2021    PHOS 2.2 10/18/2017      No results for input(s): PH, PO2ART, OAA8GZW, HCO3, BEART, O2SAT in the last 72 hours. Wt Readings from Last 3 Encounters:   11/12/21 202 lb 3.2 oz (91.7 kg)   07/09/21 205 lb (93 kg)   10/18/17 184 lb 14.4 oz (83.9 kg)        EXAMINATION:   ONE XRAY VIEW OF THE CHEST       11/16/2021 11:46 am       COMPARISON:   November 14       HISTORY:   ORDERING SYSTEM PROVIDED HISTORY: Lungs. Covid positive   TECHNOLOGIST PROVIDED HISTORY:   Reason for exam:->Lungs. Covid positive   Reason for Exam: Lungs.  Covid positive       FINDINGS:   Bibasilar ill-defined pulmonary disease is present.  More focally   consolidative in the left lower lobe.  The consolidation appears increased,   unchanged disease elsewhere.  No evidence of pneumothorax.           Impression   Slightly increased severity of left greater than right partially   consolidative bibasilar pulmonary disease consistent with pneumonia               ASSESMENT  Ac tresp failure  covid pneumonia  Ac bronchospasm        PLAN  cpm  Cont airvo  cxr is about the same to slightly worst    11/17/2021  Tory Cutler MD, M.D.

## 2021-11-17 NOTE — PROGRESS NOTES
PROCEDURE PERFORMED: RIJ CVC placement by Dr. Jasbir Barbour: limited venous access    INFORMED CONSENT:  Obtained prior to procedure. Consent placed in chart. CATY SCORE PRE PROCEDURE:  NA    PT IN THE ROOM AT WHAT TIME: Bedside procedure    ASSESSMENT: Pt lethargic & nonverbal @ baseline    TIME OUT COMPLETE: 1540    BARRIER PRECAUTIONS & STERILE TECHNIQUE:               Pt in hospital bed and positioned for comfort. Pt on Cardiac Monitor. Pt prepped and draped in a sterile fashion with chlorhexadine.     PAIN/LOCAL ANESTHESIA/SEDATION MANAGEMENT:           Local: Lidocaine given by Dr. Zuniga Terry:           ACCESS TIME: 9066          US/FLUORO: US 6 images          WIRE USED: Stiff MIcropuncture           CATHETER USED: 7fr x 20cm triple lumen central venous catheter placement       Sutured in place by Dr. Harley Varghese: Jose Spencre applied by Keith Mckinnon RN    SPECIMENS: None    EBL: <1cc    FOLLOW- UP X-RAY: Stat ordered    COMPLICATIONS/ OUTCOME: None    STAFF PRESENT DURING PROCEDURE: Dr. Bebeto Dewitt RN, Ese RN    [address] [place] [address] Road: NA    REPORT CALLED TO: Benedicto Rhodes RN @ bedside    PT LEFT ROOM AT WHAT TIME: NA

## 2021-11-17 NOTE — PROGRESS NOTES
Hospitalist Progress Note      PCP: Bailee Sun MD    Date of Admission: 11/10/2021    LOS: 7      Case Summary:   69-year-old gentleman with history of coronary artery disease, type 2 diabetes, GERD, hyperlipidemia, hypertension who presented with 2-day history of worsening shortness of breath and found to have COVID-19 pneumonia and acute respiratory failure with hypoxia complicated by atrial fibrillation with rapid ventricular response, acute metabolic encephalopathy. CT brain unremarkable for acute pathology but noted for large old right MCA stroke with severe chronic microvascular disease within the periventricular area. Active Hospital Problems    Diagnosis Date Noted    HTN (hypertension) [I10] 03/21/2012     Priority: Medium    Acute respiratory failure with hypoxia ([address] [J96.01] 11/16/2021    Atrial fibrillation with RVR ([address] [I48.91] 11/16/2021    Inadequate nutrition [E63.9] 11/16/2021    Septic shock ([address] [A41.9, R65.21] 11/16/2021    Acute metabolic encephalopathy [W30.24] 11/16/2021    Type 2 diabetes mellitus ([address] [E11.9] 11/16/2021    Hypokalemia [E87.6] 11/16/2021    Cardiomyopathy ([address] [I42.9] 11/16/2021    Pneumonia due to COVID-19 virus [U07.1, J12.82] 11/10/2021         Principal Problem:    Pneumonia due to COVID-19 virus and Acute respiratory failure with hypoxia: Remains with tenuous respiratory status requiring high flow nasal cannula. Patient was on BiPAP overnight. Remains on high flow nasal cannula 100% with 55 L. Sats around 92%. Noted poor progress.  -Continue baricitinib  -Continue steroids  -Continue to supplementation with target sats greater than 90% and wean off as tolerated      Atrial fibrillation with RVR ([address]): New onset. Improved with rate control.   Echocardiogram done 11/11/2021 with EF of 30% and grade 1 diastolic dysfunction.  -Continue amiodarone, metoprolol, Eliquis  -Cardiology following with recommendations      Cardiomyopathy: In setting ondansetron, polyethylene glycol, acetaminophen **OR** acetaminophen, potassium chloride **OR** potassium alternative oral replacement **OR** potassium chloride      DVT Prophylaxis: Subcut enoxaparin  Diet: ADULT ORAL NUTRITION SUPPLEMENT; Lunch, Breakfast; Fortified Pudding Oral Supplement  ADULT ORAL NUTRITION SUPPLEMENT; Dinner; Frozen Oral Supplement  ADULT DIET; Dysphagia - Pureed; Moderately Thick (Honey); Likes chocolate flavors  Code Status: DNR-CCA        ____________________________________________________________________________    Subjective:   Overnight Events:   Uneventful overnight  Remains pleasantly confused  On heated high flow nasal cannula        Physical Exam:  BP (!) 97/44   Pulse 89   Temp 97.5 °F (36.4 °C) (Axillary)   Resp 19   Ht 5' 9.02\" (1.753 m)   Wt 202 lb 3.2 oz (91.7 kg)   SpO2 92%   BMI 29.85 kg/m²   General appearance: No apparent distress, appears stated age and cooperative. HEENT: Normocephalic, atraumatic, MMM, No sclera icterus/conjuctival palor  Neck: Supple, no thyromegally. No jugular venous distention. Respiratory:  Normal respiratory effort. Clear to auscultation, no Rales/Wheezes/Rhonchi. Cardiovascular: S1/S2 without murmurs, rubs or gallops. RRR  Abdomen: Soft, non-tender, non-distended, bowel sounds present. Musculoskeletal: No clubbing, cyanosis or edema bilaterally. Skin: Skin color, texture, turgor normal.  No rashes or lesions.   Neurologic:  Cranial nerves: II-XII intact, LAURO, No focal sensory/motor deficits         Intake/Output Summary (Last 24 hours) at 11/17/2021 1150  Last data filed at 11/16/2021 1300  Gross per 24 hour   Intake 120 ml   Output --   Net 120 ml       Labs:   Recent Labs     11/16/21  1645 11/17/21  1109   WBC 11.1* 15.8*   HGB 13.4* 13.5   HCT 40.7* 40.9*    298      Recent Labs     11/16/21  1645      K 2.5*      CO2 25   BUN 30*   CREATININE 0.5*   CALCIUM 8.0*   PHOS 2.9   AST 27   ALT 15   BILITOT 0.9 ALKPHOS 83     No results for input(s): Selena Andres in the last 72 hours. Urinalysis:    Lab Results   Component Value Date    NITRU NEGATIVE 09/23/2021    WBCUA 1 09/23/2021    BACTERIA NEGATIVE 09/23/2021    RBCUA 3 09/23/2021    BLOODU MODERATE 09/23/2021    SPECGRAV 1.005 09/23/2021       Radiology:  XR CHEST PORTABLE   Final Result   Slightly increased severity of left greater than right partially   consolidative bibasilar pulmonary disease consistent with pneumonia         XR CHEST PORTABLE   Final Result   Slightly decreased but persistent pulmonary opacities could represent edema   or multifocal pneumonia         CT HEAD WO CONTRAST   Final Result   No acute intracranial abnormality. Large old right MCA infarct severe chronic microvascular disease within the   periventricular         XR CHEST PORTABLE   Final Result   1. Multifocal bilateral pneumonia in this patient with given history of   COVID-19. IR NONTUNNELED VASCULAR CATHETER > 5 YEARS    (Results Pending)           Eva Yu MD      Please excuse brevity and/or typos. This report was transcribed using voice recognition software. Every effort was made to ensure accuracy, however, inadvertent computerized transcription errors may be present.

## 2021-11-17 NOTE — PROGRESS NOTES
Progress Note( Dr. Kaya Herman)  11/17/2021  Subjective:   Admit Date: 11/10/2021  PCP: Ignacia Bustillos MD    Admitted For :Shortness of breath hypoxia on home pulse oximeter  Patient is nonverbal    Consulted For:  Better control of blood glucoseHistory:    Interval history :appears somewhat worse patient is non verbal still  Patient was placed on BiPAP for better oxygen saturation    Denies any chest pains,   Yes SOB . On BiPAP now   denies nausea or vomiting. Able to swallow and eating lunch when he was fed by nurse  No new bowel or bladder symptoms.        Intake/Output Summary (Last 24 hours) at 11/17/2021 0648  Last data filed at 11/16/2021 1300  Gross per 24 hour   Intake 170 ml   Output --   Net 170 ml       DATA    CBC:   Recent Labs     11/16/21  1645   WBC 11.1*   HGB 13.4*       CMP:  Recent Labs     11/14/21  1056 11/16/21  1645    140   K 2.5* 2.5*    101   CO2 26 25   BUN 25* 30*   CREATININE 0.4* 0.5*   CALCIUM 7.8* 8.0*   PROT 5.4* 5.6*   LABALBU 2.6*  2.6* 2.6*   BILITOT 0.7 0.9   ALKPHOS 85 83   AST 36 27   ALT 21 15     Lipids:   Lab Results   Component Value Date    CHOL 159 07/13/2020    HDL 41 07/13/2020    TRIG 125 07/13/2020     Glucose:  Recent Labs     11/16/21  2001 11/17/21  0109 11/17/21  0610   POCGLU 130* 94 115*     ItcazmbihdC9Q:  Lab Results   Component Value Date    LABA1C 9.2 11/12/2021     High Sensitivity TSH:   Lab Results   Component Value Date    TSHHS 1.890 11/10/2021     Free T3: No results found for: FT3  Free T4:  Lab Results   Component Value Date    T4FREE 1.09 11/10/2021       XR CHEST PORTABLE   Final Result   Slightly increased severity of left greater than right partially   consolidative bibasilar pulmonary disease consistent with pneumonia         XR CHEST PORTABLE   Final Result   Slightly decreased but persistent pulmonary opacities could represent edema   or multifocal pneumonia         CT HEAD WO CONTRAST   Final Result   No acute intracranial Problem List:     Cerebral infarction ([facility]     HTN (hypertension)     DM (diabetes mellitus) with complications (HCC)     Hypercholesteremia     CAD (coronary artery disease)     Occlusion and stenosis of carotid artery     Stroke, acute, within 8 weeks     Hemiparesis, left (HCC)     Dysphagia     Aphasia     Pneumonia due to organism     Leukocytosis     Cholecystitis     Sepsis ([facility]     WD-Osteomyelitis of great toe of left foot ([facility]     Diabetic ulcer of left foot associated with diabetes mellitus due to underlying condition, with fat layer exposed [facility]     COVID-19      Plan:     1. Reviewed POC blood glucose . Labs and X ray results   2. Reviewed Current Medicines   3. On Correction bolus Humalog/ Basal Lantus Insulin regime   4. Monitor Blood glucose frequently   5. Modified  the dose of Insulin/ other medicines as needed   6. Will follow     .      Edson Sawyer MD, MD

## 2021-11-18 NOTE — PROGRESS NOTES
blocker  Yes added today     HR greater than 70 with patient with LVEF  < 35 Yes  Able to use Ivabradine No, d/t AF     Will add farxiga once optimized on other meds     PAST MEDICAL HISTORY:  History of a stroke, left-sided.  History of  hypertension, hyperlipidemia, diabetes, coronary artery disease status  post 5-vessel bypass surgery done.  I do not know his graft.  Dysphagia,  pneumonia, UTI, kidney stones, and cholecystitis present.     PAST SURGICAL HISTORY:  History of a 5-vessel bypass surgery done. History of gallbladder removed, and also I think he has a partial  pancreatectomy done.     SOCIAL HISTORY:  He does not smoke and does not drink.     ALLERGIES:  His allergies are three medications, MENTHOL, ZINC OXIDE,  LYRICA, and PRADAXA.     MEDICATIONS AT HOME:  Prior to the hospitalization, he was on Bactrim,  Percocet, Protonix, Zoloft, Desyrel, and Pravachol.       Objective:   BP (!) 132/57   Pulse 99   Temp 98 °F (36.7 °C) (Axillary)   Resp (!) 34   Ht 5' 9.02\" (1.753 m)   Wt 202 lb 3.2 oz (91.7 kg)   SpO2 93%   BMI 29.85 kg/m²     Intake/Output Summary (Last 24 hours) at 11/18/2021 0903  Last data filed at 11/18/2021 0602  Gross per 24 hour   Intake 1080 ml   Output 800 ml   Net 280 ml       Medications:   Scheduled Meds:   insulin glargine  30 Units SubCUTAneous Nightly    spironolactone  25 mg Oral Daily    lidocaine PF  5 mL IntraDERmal Once    sodium chloride flush  5-40 mL IntraVENous 2 times per day    lisinopril  2.5 mg Oral Daily    insulin lispro  0-12 Units SubCUTAneous 2 times per day    baricitinib  4 mg Oral Daily    insulin lispro  15 Units SubCUTAneous TID WC    insulin lispro  0-12 Units SubCUTAneous TID WC    amiodarone  200 mg Oral Daily    apixaban  5 mg Oral BID    metoprolol tartrate  25 mg Oral TID    guaiFENesin  600 mg Oral BID    pantoprazole  40 mg Oral Daily    pravastatin  40 mg Oral Daily    sertraline  100 mg Oral Daily    tamsulosin  0.4 mg Oral Daily    sodium chloride flush  5-40 mL IntraVENous 2 times per day    dexamethasone  6 mg Oral Daily      Infusions:   sodium chloride      dextrose      sodium chloride        PRN Meds:  potassium chloride **OR** potassium alternative oral replacement **OR** potassium chloride, sodium chloride flush, sodium chloride, potassium chloride, glucose, dextrose, glucagon (rDNA), dextrose, sodium chloride flush, sodium chloride, ondansetron **OR** ondansetron, polyethylene glycol, acetaminophen **OR** acetaminophen, potassium chloride **OR** potassium alternative oral replacement **OR** potassium chloride       Physical Exam:  Vitals:    11/18/21 0715   BP:    Pulse:    Resp:    Temp:    SpO2: 93%        General: AAO, NAD  Chest: Nontender  Cardiac: First and Second Heart Sounds are Normal, No Murmurs or Gallops noted  Lungs:Clear to auscultation and percussion. Abdomen: Soft, NT, ND, +BS  Extremities: No clubbing, no edema  Vascular:  Equal 2+ peripheral pulses. Lab Data:  CBC:   Recent Labs     11/16/21  1645 11/17/21  1109   WBC 11.1* 15.8*   HGB 13.4* 13.5   HCT 40.7* 40.9*   MCV 89.5 90.9    298     BMP:   Recent Labs     11/16/21  1645 11/17/21  1109    143   K 2.5* 3.0*    103   CO2 25 25   PHOS 2.9 2.9   BUN 30* 37*   CREATININE 0.5* 0.5*     LIVER PROFILE:   Recent Labs     11/16/21  1645 11/17/21  1109   AST 27 29   ALT 15 13   BILIDIR  --  0.3   BILITOT 0.9 0.9   ALKPHOS 83 95     PT/INR: No results for input(s): PROTIME, INR in the last 72 hours. APTT: No results for input(s): APTT in the last 72 hours. BNP:  No results for input(s): BNP in the last 72 hours.       Assessment:  Patient Active Problem List    Diagnosis Date Noted    Cerebral infarction [facility]) 03/21/2012    Occlusion and stenosis of carotid artery 03/24/2012    HTN (hypertension) 03/21/2012    [facility] 31/84/7168    CAD (coronary artery disease) 03/21/2012    Hypercholesteremia 03/21/2012    Acute respiratory failure with hypoxia ([address] 11/16/2021    Atrial fibrillation with RVR ([address] 11/16/2021    Inadequate nutrition 11/16/2021    Septic shock ([address] 11/16/2021    Acute metabolic encephalopathy 81/50/3673    Type 2 diabetes mellitus ([address] 11/16/2021    Hypokalemia 11/16/2021    Cardiomyopathy ([address] 11/16/2021    Pneumonia due to COVID-19 virus 11/10/2021    Diabetic ulcer of left foot associated with diabetes mellitus due to underlying condition, with fat layer exposed ([address] 07/13/2021    WD-Osteomyelitis of great toe of left foot ([address] 05/04/2021    Sepsis ([facility] 07/11/2016    Cholecystitis 03/29/2015    Pneumonia due to organism 02/18/2015    Leukocytosis 02/18/2015    Stroke, acute, within 8 weeks 03/29/2012    Hemiparesis, left ([address] 03/29/2012    Dysphagia 03/29/2012    Aphasia 03/29/2012       Electronically signed by Debi Moreira PA-C on 11/18/2021 at 9:03 AM    .. Electronically signed by Amrik Jackson MD on 11/18/21 at 10:28 AM EST

## 2021-11-18 NOTE — PLAN OF CARE
Problem: Airway Clearance - Ineffective  Goal: Achieve or maintain patent airway  11/18/2021 1005 by Deyvi Prabhakar RN  Outcome: Ongoing  11/18/2021 0910 by Deyvi Prabhakar RN  Outcome: Ongoing     Problem: Gas Exchange - Impaired  Goal: Absence of hypoxia  11/18/2021 1005 by Deyvi Prabhakar RN  Outcome: Ongoing  11/18/2021 0910 by Deyvi Prabhakar RN  Outcome: Ongoing  Goal: Promote optimal lung function  11/18/2021 1005 by Deyvi Prabhakar RN  Outcome: Ongoing  11/18/2021 0910 by Deyvi Prabhakar RN  Outcome: Ongoing     Problem: Breathing Pattern - Ineffective  Goal: Ability to achieve and maintain a regular respiratory rate  11/18/2021 1005 by Deyvi Prabhakar RN  Outcome: Ongoing  11/18/2021 0910 by Deyvi Prabhakar RN  Outcome: Ongoing     Problem:  Body Temperature -  Risk of, Imbalanced  Goal: Ability to maintain a body temperature within defined limits  11/18/2021 1005 by Deyvi Prabhakar RN  Outcome: Ongoing  11/18/2021 0910 by Deyvi Prabhakar RN  Outcome: Ongoing  Goal: Will regain or maintain usual level of consciousness  11/18/2021 1005 by Deyvi Prabhakar RN  Outcome: Ongoing  11/18/2021 0910 by Deyvi Prabhakar RN  Outcome: Ongoing  Goal: Complications related to the disease process, condition or treatment will be avoided or minimized  11/18/2021 1005 by Deyvi Prabhakar RN  Outcome: Ongoing  11/18/2021 0910 by Deyvi Prabhakar RN  Outcome: Ongoing     Problem: Isolation Precautions - Risk of Spread of Infection  Goal: Prevent transmission of infection  11/18/2021 1005 by Deyvi Prabhakar RN  Outcome: Ongoing  11/18/2021 0910 by Deyvi Prabhakar RN  Outcome: Ongoing     Problem: Nutrition Deficits  Goal: Optimize nutritional status  11/18/2021 1005 by Deyvi Prabhakar RN  Outcome: Ongoing  11/18/2021 0910 by Deyvi Prabhakar RN  Outcome: Ongoing     Problem: Risk for Fluid Volume Deficit  Goal: Maintain normal heart rhythm  11/18/2021 1005 by Deyvi Prabhakar RN  Outcome: Ongoing  11/18/2021 physical injury  11/18/2021 1005 by Zach Martin RN  Outcome: Ongoing  11/18/2021 0910 by Zach Martin RN  Outcome: Ongoing

## 2021-11-18 NOTE — PROGRESS NOTES
pulmonary      SUBJECTIVE: he remains on high fio2     OBJECTIVE    VITALS:  BP 95/65   Pulse 97   Temp 98.8 °F (37.1 °C) (Axillary)   Resp 28   Ht 5' 9.02\" (1.753 m)   Wt 202 lb 3.2 oz (91.7 kg)   SpO2 (!) 87%   BMI 29.85 kg/m²   HEAD AND FACE EXAM:  No throat injection, no active exudate,no thrush  NECK EXAM;No JVD, no masses, symmetrical  CHEST EXAM; Expansion equal and symmetrical, no masses  LUNG EXAM; Good breath sounds bilaterally. There are expiratory wheezes both lungs, there are crackles at both lung bases  CARDIOVASCULAR EXAM: Positive S1 and S2, no S3 or S4, no clicks ,no murmurs  RIGHT AND LEFT LOWER EXTRIMITY EXAM: No edema, no swelling, no inflamation  CNS EXAM: Alert and oriented X3          LABS   Lab Results   Component Value Date    WBC 15.8 (H) 11/17/2021    HGB 13.5 11/17/2021    HCT 40.9 (L) 11/17/2021    MCV 90.9 11/17/2021     11/17/2021     Lab Results   Component Value Date    CREATININE 0.5 (L) 11/18/2021    BUN 38 (H) 11/18/2021     11/18/2021    K 3.1 (L) 11/18/2021     11/18/2021    CO2 24 11/18/2021     Lab Results   Component Value Date    INR 0.92 11/11/2021    PROTIME 11.8 11/11/2021          Lab Results   Component Value Date    PHOS 2.9 11/17/2021    PHOS 2.9 11/16/2021    PHOS 1.4 11/14/2021      No results for input(s): PH, PO2ART, AYA0HCI, HCO3, BEART, O2SAT in the last 72 hours. Wt Readings from Last 3 Encounters:   11/12/21 202 lb 3.2 oz (91.7 kg)   07/09/21 205 lb (93 kg)   10/18/17 184 lb 14.4 oz (83.9 kg)               ASSESMENT  Ac resp failure  covid pneumonia  Ac bronchospasm        PLAN  1. Bd rx  2.  Cont high flow o2 and try to wean  3. cpm    11/18/2021  Omayra Earl MD, M.SHELL.

## 2021-11-18 NOTE — PROGRESS NOTES
Talked with Wife Martha Lopez; discussed if she would like her  vented if his oxygen level continues to drop. She is going to discuss this with her family and give me a call back. I asked the Pt aMry Bateman if he would like to be put to sleep and a breathing tube placed, he said no.

## 2021-11-18 NOTE — PLAN OF CARE
Problem: Airway Clearance - Ineffective  Goal: Achieve or maintain patent airway  Outcome: Ongoing     Problem: Gas Exchange - Impaired  Goal: Absence of hypoxia  Outcome: Ongoing  Goal: Promote optimal lung function  Outcome: Ongoing     Problem: Breathing Pattern - Ineffective  Goal: Ability to achieve and maintain a regular respiratory rate  Outcome: Ongoing     Problem:  Body Temperature -  Risk of, Imbalanced  Goal: Ability to maintain a body temperature within defined limits  Outcome: Ongoing  Goal: Will regain or maintain usual level of consciousness  Outcome: Ongoing  Goal: Complications related to the disease process, condition or treatment will be avoided or minimized  Outcome: Ongoing     Problem: Isolation Precautions - Risk of Spread of Infection  Goal: Prevent transmission of infection  Outcome: Ongoing     Problem: Nutrition Deficits  Goal: Optimize nutritional status  Outcome: Ongoing     Problem: Risk for Fluid Volume Deficit  Goal: Maintain normal heart rhythm  Outcome: Ongoing  Goal: Maintain absence of muscle cramping  Outcome: Ongoing  Goal: Maintain normal serum potassium, sodium, calcium, phosphorus, and pH  Outcome: Ongoing     Problem: Loneliness or Risk for Loneliness  Goal: Demonstrate positive use of time alone when socialization is not possible  Outcome: Ongoing     Problem: Fatigue  Goal: Verbalize increase energy and improved vitality  Outcome: Ongoing     Problem: Patient Education: Go to Patient Education Activity  Goal: Patient/Family Education  Outcome: Ongoing     Problem: Nutrition  Goal: Optimal nutrition therapy  Outcome: Ongoing     Problem: Skin Integrity:  Goal: Will show no infection signs and symptoms  Description: Will show no infection signs and symptoms  Outcome: Ongoing  Goal: Absence of new skin breakdown  Description: Absence of new skin breakdown  Outcome: Ongoing     Problem: Falls - Risk of:  Goal: Will remain free from falls  Description: Will remain free from falls  Outcome: Ongoing  Goal: Absence of physical injury  Description: Absence of physical injury  Outcome: Ongoing

## 2021-11-18 NOTE — PROGRESS NOTES
Pt BG 49, 1/2 amp of D50% given, Pt BG is now 99. Paged Dr. Macho Bateman regarding administration of D5% @ 100 ml/hr and concerns for fluid overload. Waiting for response.

## 2021-11-18 NOTE — PROGRESS NOTES
Hospitalist Progress Note      PCP: Rosario Alcaraz MD    Date of Admission: 11/10/2021    LOS: 8      Case Summary:   75-year-old gentleman with history of CAD, type 2 diabetes, GERD, HLD, HTN admitted with COVID-19 pneumonia and acute respiratory failure with hypoxia complicated by A. fib with RVR and acute metabolic encephalopathy. CT brain unremarkable for acute pathology but noted for large old right MCA stroke with severe chronic microvascular disease within the periventricular area. Encephalopathy improved. Echocardiogram done 11/11/2021 noted for EF of 30% and grade 1 diastolic dysfunction    Active Hospital Problems    Diagnosis Date Noted    HTN (hypertension) [I10] 03/21/2012     Priority: Medium    Acute respiratory failure with hypoxia ([address] [J96.01] 11/16/2021    Atrial fibrillation with RVR ([address] [I48.91] 11/16/2021    Inadequate nutrition [E63.9] 11/16/2021    Septic shock ([address] [A41.9, R65.21] 11/16/2021    Acute metabolic encephalopathy [I13.79] 11/16/2021    Type 2 diabetes mellitus ([address] [E11.9] 11/16/2021    Hypokalemia [E87.6] 11/16/2021    Cardiomyopathy ([address] [I42.9] 11/16/2021    Pneumonia due to COVID-19 virus [U07.1, J12.82] 11/10/2021         Principal Problem:    Pneumonia due to COVID-19 virus and Acute respiratory failure with hypoxia: Tenuous respiratory status with poor progress. This morning sats sitting in mid eighties on heated high flow nasal cannula and nonrebreather. Patient does not want intubation. He is intolerant of BiPAP. -Continue current oxygen management status  -Continue on baricitinib and steroids  -Given decline with low sats and not wanting BiPAP or intubation, will consult with palliation to help address goals of care. Atrial fibrillation with RVR ([address]): mild tachycardia this morning.  -Continue amiodarone, metoprolol, Eliquis  -Cardiology follow recommendation      Cardiomyopathy: In setting of Covid infection and RVR.   Cardiology following with plans for outpatient evaluation once acute illness recovers    Active Problems:    HTN (hypertension): Stable on metoprolol    Inadequate nutrition: Poor oral intake. Had episode of hypoglycemia this morning. Given decline In oxygen status and disinterest in intubation and BiPAP due to intolerance, will consult with palliation and hold off on extended nutrition at this time    Type 2 diabetes mellitus ([facility]):  We will hold off sliding scale insulin and monitor glycemic levels    Hypokalemia: Replete and monitor      Resolved Problems:    Septic shock (HCC)    Acute metabolic encephalopathy          Medications:  Reviewed  Infusion Medications    dilTIAZem (CARDIZEM) 125 mg in dextrose 5% 125 mL infusion 5 mg/hr (11/18/21 1216)    sodium chloride      dextrose      sodium chloride       Scheduled Medications    insulin glargine  30 Units SubCUTAneous Nightly    furosemide  40 mg IntraVENous Once    albumin human  25 g IntraVENous Once    lidocaine PF  5 mL IntraDERmal Once    sodium chloride flush  5-40 mL IntraVENous 2 times per day    insulin lispro  0-12 Units SubCUTAneous 2 times per day    baricitinib  4 mg Oral Daily    insulin lispro  15 Units SubCUTAneous TID WC    insulin lispro  0-12 Units SubCUTAneous TID WC    amiodarone  200 mg Oral Daily    apixaban  5 mg Oral BID    guaiFENesin  600 mg Oral BID    pantoprazole  40 mg Oral Daily    sertraline  100 mg Oral Daily    tamsulosin  0.4 mg Oral Daily    sodium chloride flush  5-40 mL IntraVENous 2 times per day    dexamethasone  6 mg Oral Daily     PRN Meds: potassium chloride **OR** potassium alternative oral replacement **OR** potassium chloride, sodium chloride flush, sodium chloride, potassium chloride, glucose, dextrose, glucagon (rDNA), dextrose, sodium chloride flush, sodium chloride, ondansetron **OR** ondansetron, polyethylene glycol, acetaminophen **OR** acetaminophen, potassium chloride **OR** potassium alternative oral replacement **OR** potassium chloride      DVT Prophylaxis: Subcut enoxaparin  Diet: ADULT ORAL NUTRITION SUPPLEMENT; Lunch, Breakfast; Fortified Pudding Oral Supplement  ADULT ORAL NUTRITION SUPPLEMENT; Dinner; Frozen Oral Supplement  ADULT DIET; Dysphagia - Pureed; Moderately Thick (Honey); Likes chocolate flavors  Code Status: DNR-CCA        ____________________________________________________________________________    Subjective:   Overnight Events:   Doing poorly this morning with desats despite heated high flow nasal cannula and nonrebreather  Patient does not want intubation and is intolerant of BiPAP      Physical Exam:  BP 95/65   Pulse 97   Temp 98.8 °F (37.1 °C) (Axillary)   Resp 28   Ht 5' 9.02\" (1.753 m)   Wt 202 lb 3.2 oz (91.7 kg)   SpO2 (!) 87%   BMI 29.85 kg/m²   General appearance: No apparent distress, appears stated age and cooperative. HEENT: Normocephalic, atraumatic, MMM, No sclera icterus/conjuctival palor  Neck: Supple, no thyromegally. No jugular venous distention. Respiratory:  Normal respiratory effort. Clear to auscultation, no Rales/Wheezes/Rhonchi. Cardiovascular: S1/S2 without murmurs, rubs or gallops. RRR  Abdomen: Soft, non-tender, non-distended, bowel sounds present. Musculoskeletal: No clubbing, cyanosis or edema bilaterally. Skin: Skin color, texture, turgor normal.  No rashes or lesions.   Neurologic:  Cranial nerves: II-XII intact, LAURO, No focal sensory/motor deficits  Psychiatric: Alert and oriented, thought content appropriate  Capillary Refill: Brisk,< 3 seconds   Peripheral Pulses: +2 palpable, equal bilaterally       Intake/Output Summary (Last 24 hours) at 11/18/2021 1339  Last data filed at 11/18/2021 1004  Gross per 24 hour   Intake 1090 ml   Output 800 ml   Net 290 ml       Labs:   Recent Labs     11/16/21  1645 11/17/21  1109 11/18/21  1253   WBC 11.1* 15.8* 15.1*   HGB 13.4* 13.5 12.2*   HCT 40.7* 40.9* 37.4*    298 327      Recent Labs 11/16/21  1645 11/17/21  1109 11/18/21  1117    143 139   K 2.5* 3.0* 3.1*    103 101   CO2 25 25 24   BUN 30* 37* 38*   CREATININE 0.5* 0.5* 0.5*   CALCIUM 8.0* 8.2* 7.9*   PHOS 2.9 2.9  --    AST 27 29  --    ALT 15 13  --    BILIDIR  --  0.3  --    BILITOT 0.9 0.9  --    ALKPHOS 83 95  --      No results for input(s): CKTOTAL, TROPONINI in the last 72 hours. Urinalysis:    Lab Results   Component Value Date    NITRU NEGATIVE 09/23/2021    WBCUA 1 09/23/2021    BACTERIA NEGATIVE 09/23/2021    RBCUA 3 09/23/2021    BLOODU MODERATE 09/23/2021    SPECGRAV 1.005 09/23/2021       Radiology:  XR CHEST PORTABLE   Preliminary Result   1. Right IJ CVC terminating in the right atrium. No measurable pneumothorax. 2. Mildly improved right basilar opacities. Grossly stable left basilar   opacities. IR NONTUNNELED VASCULAR CATHETER > 5 YEARS   Final Result   Successful ultrasound guided non-tunneled right internal jugular vein   triple-lumen central venous catheter placement at the unit bedside. XR CHEST PORTABLE   Final Result   Slightly increased severity of left greater than right partially   consolidative bibasilar pulmonary disease consistent with pneumonia         XR CHEST PORTABLE   Final Result   Slightly decreased but persistent pulmonary opacities could represent edema   or multifocal pneumonia         CT HEAD WO CONTRAST   Final Result   No acute intracranial abnormality. Large old right MCA infarct severe chronic microvascular disease within the   periventricular         XR CHEST PORTABLE   Final Result   1. Multifocal bilateral pneumonia in this patient with given history of   COVID-19. Rick Holloway MD      Please excuse brevity and/or typos. This report was transcribed using voice recognition software. Every effort was made to ensure accuracy, however, inadvertent computerized transcription errors may be present.

## 2021-11-19 NOTE — PROGRESS NOTES
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 Pt had improvements overnight. He slept well. LOC improved, answering appropriately with nods and able to communicate his needs with one word requests such as \"drink\" or \"cold\". Pt's R lung is coarse/rhonchorous. Pt has wet sounding cough. Family is requesting IVABx and specifically asked for Azithromycin d/t it's \"antiinflammatory qualities\", daughter is very nice and supportive of staff, but would like to see her dad on Abx. K+ replacement continues to be a challenge. A.M. labs drawn and sent. Add K+ scheduled or in maintenance fluids? Lantus originally held overnight d/t hypoglycemic events during the day. 1/2 dose of 15 units given this morning because Pt is creeping back up there, last FSBG 267. Levo gtt parked at 4/Dilt gtt at 5. Pt is mostly rate controlled with a lot of ectopy and remains in Afib. B/P is soft, kat diastolic.   SB/P above 100 but MAP remains around 60 d/t such low DB/P.

## 2021-11-19 NOTE — PROGRESS NOTES
Progress Note( Dr. Fong November)  11/19/2021  Subjective:   Admit Date: 11/10/2021  PCP: Madina Cano MD    Admitted For :Shortness of breath hypoxia on home pulse oximeter  Patient is nonverbal    Consulted For:  Better control of blood glucoseHistory:    Interval history :appears somewhat worse patient is non verbal still  Patient was placed on BiPAP for better oxygen saturation  Patient's respiratory status stable barely eating  Denies any chest pains,   Yes SOB . Off BiPAP now   denies nausea or vomiting. Barely eating  Patient is CODE STATUS change only to have have resuscitation with drugs only   No ventilator/no defibrillation or cardioversion//no chest compressions    No new bowel or bladder symptoms. Intake/Output Summary (Last 24 hours) at 11/19/2021 0639  Last data filed at 11/19/2021 0559  Gross per 24 hour   Intake 2148.91 ml   Output 1300 ml   Net 848.91 ml       DATA    CBC:   Recent Labs     11/16/21  1645 11/17/21  1109 11/18/21  1253   WBC 11.1* 15.8* 15.1*   HGB 13.4* 13.5 12.2*    298 327    CMP:  Recent Labs     11/16/21  1645 11/16/21  1645 11/17/21  1109 11/17/21  1109 11/18/21  1117 11/18/21  1253 11/18/21  1823      < > 143  --  139 141  --    K 2.5*   < > 3.0*   < > 3.1* 3.2* 3.4*      < > 103  --  101 103  --    CO2 25   < > 25  --  24 25  --    BUN 30*   < > 37*  --  38* 40*  --    CREATININE 0.5*   < > 0.5*  --  0.5* 0.6*  --    CALCIUM 8.0*   < > 8.2*  --  7.9* 7.8*  --    PROT 5.6*  --  5.8*  --   --  5.2*  --    LABALBU 2.6*  --  2.6*  2.6*  --   --  2.4*  2.4*  --    BILITOT 0.9  --  0.9  --   --  0.7  --    ALKPHOS 83  --  95  --   --  93  --    AST 27  --  29  --   --  44*  --    ALT 15  --  13  --   --  19  --     < > = values in this interval not displayed.      Lipids:   Lab Results   Component Value Date    CHOL 159 07/13/2020    HDL 41 07/13/2020    TRIG 125 07/13/2020     Glucose:  Recent Labs     11/18/21  1832 11/18/21  2007 11/19/21  0228   POCGLU 91 156* 267*     [id number]:  Lab Results   Component Value Date    LABA1C 9.2 11/12/2021     High Sensitivity TSH:   Lab Results   Component Value Date    TSHHS 1.890 11/10/2021     Free T3: No results found for: FT3  Free T4:  Lab Results   Component Value Date    T4FREE 1.09 11/10/2021       XR CHEST PORTABLE   Preliminary Result   1. Right IJ CVC terminating in the right atrium. No measurable pneumothorax. 2. Mildly improved right basilar opacities. Grossly stable left basilar   opacities. IR NONTUNNELED VASCULAR CATHETER > 5 YEARS   Final Result   Successful ultrasound guided non-tunneled right internal jugular vein   triple-lumen central venous catheter placement at the unit bedside. XR CHEST PORTABLE   Final Result   Slightly increased severity of left greater than right partially   consolidative bibasilar pulmonary disease consistent with pneumonia         XR CHEST PORTABLE   Final Result   Slightly decreased but persistent pulmonary opacities could represent edema   or multifocal pneumonia         CT HEAD WO CONTRAST   Final Result   No acute intracranial abnormality. Large old right MCA infarct severe chronic microvascular disease within the   periventricular         XR CHEST PORTABLE   Final Result   1. Multifocal bilateral pneumonia in this patient with given history of   COVID-19.               Scheduled Medicines   Medications:    insulin glargine  30 Units SubCUTAneous Nightly    lidocaine PF  5 mL IntraDERmal Once    sodium chloride flush  5-40 mL IntraVENous 2 times per day    insulin lispro  0-12 Units SubCUTAneous 2 times per day    baricitinib  4 mg Oral Daily    insulin lispro  15 Units SubCUTAneous TID WC    insulin lispro  0-12 Units SubCUTAneous TID WC    amiodarone  200 mg Oral Daily    apixaban  5 mg Oral BID    guaiFENesin  600 mg Oral BID    pantoprazole  40 mg Oral Daily    sertraline  100 mg Oral Daily    tamsulosin  0.4 mg Oral Daily    sodium chloride flush  5-40 mL IntraVENous 2 times per day    dexamethasone  6 mg Oral Daily      Infusions:    dilTIAZem (CARDIZEM) 125 mg in dextrose 5% 125 mL infusion 5 mg/hr (11/19/21 7365)    dextrose 50 mL/hr at 11/18/21 1845    norepinephrine 4 mcg/min (11/18/21 2203)    sodium chloride      dextrose      sodium chloride           Objective:   Vitals: BP (!) 116/43   Pulse 96   Temp 98.3 °F (36.8 °C) (Axillary)   Resp 24   Ht 5' 9.02\" (1.753 m)   Wt 202 lb 3.2 oz (91.7 kg)   SpO2 97%   BMI 29.85 kg/m²   General appearance: alert and cooperative with exam nonverbal  Neck: no JVD or bruit  Thyroid : Normal lobes   Lungs: Has Vesicular Breath sounds   Heart:  regular rate and rhythm  Abdomen: soft, non-tender; bowel sounds normal; no masses,  no organomegaly  Musculoskeletal: Normal  Extremities: extremities normal, , no edema  Neurologic:  Awake, alert, oriented to name, place and time. Cranial nerves II-XII are grossly intact. Except nonverbal motor weakness. Sensory is intact. ,  and gait is abnormal.  Appears to be bed ridden CVA    Assessment:     Patient Active Problem List:     Cerebral infarction ([address]     HTN (hypertension)     DM (diabetes mellitus) with complications (HCC)     Hypercholesteremia     CAD (coronary artery disease)     Occlusion and stenosis of carotid artery     Stroke, acute, within 8 weeks     Hemiparesis, left (HCC)     Dysphagia     Aphasia     Pneumonia due to organism     Leukocytosis     Cholecystitis     Sepsis ([address]     WD-Osteomyelitis of great toe of left foot ([address]     Diabetic ulcer of left foot associated with diabetes mellitus due to underlying condition, with fat layer exposed ([address]     COVID-19      Plan:     1. Reviewed POC blood glucose . Labs and X ray results   2. Reviewed Current Medicines   3. On Correction bolus Humalog/ Basal Lantus Insulin regime   4. Monitor Blood glucose frequently   5.  Modified  the dose of Insulin/ other medicines as needed 6. Patient refused for to be intubated and go on ventilator  7. On D10  8. Will follow     .      Sujit Arreola MD, MD

## 2021-11-19 NOTE — PROGRESS NOTES
Hospitalist Progress Note      PCP: Jenn Fernández MD    Date of Admission: 11/10/2021    LOS: 9    Subjective:   Overnight Events:   Uneventful overnight  Required lots of deep suctioning yesterday with improvement of respiratory status and O2 saturations  Remains on heated high flow nasal cannula with higher percent FiO2 at 60 L and saturations are 93%  Tenuous respiratory status with guarded prognosis  Patient does not want intubation or BiPAP. Active Hospital Problems    Diagnosis Date Noted    HTN (hypertension) [I10] 03/21/2012     Priority: Medium    Acute respiratory failure with hypoxia ([address] [J96.01] 11/16/2021    Atrial fibrillation with RVR ([address] [I48.91] 11/16/2021    Inadequate nutrition [E63.9] 11/16/2021    Septic shock ([address] [A41.9, R65.21] 11/16/2021    Acute metabolic encephalopathy [K15.27] 11/16/2021    Type 2 diabetes mellitus ([address] [E11.9] 11/16/2021    Hypokalemia [E87.6] 11/16/2021    Cardiomyopathy ([address] [I42.9] 11/16/2021    Pneumonia due to COVID-19 virus [U07.1, J12.82] 11/10/2021       Case Summary:   59-year-old gentleman history of type 2 diabetes, GERD, hyperlipidemia, hypertension, CAD, admitted with  COVID-19 Pneumonia and Acute respiratory failure with hypoxia complicated by atrial fibrillation with rapid ventricular response and acute metabolic encephalopathy. CT brain was unremarkable for acute pathology  Echocardiogram 11/11/2021 with EF of 30% and grade 1 diastolic dysfunction    Principal Problem:    Pneumonia due to COVID-19 virus and Acute respiratory failure with hypoxia: Remains with tenuous respiratory status. Patient does not want intubation or BiPAP.   Required deep suctioning yesterday and noted crackles yesterday that have improved this morning.  -Continue steroids and baricitinib  -Continue O2 supplementation with target sats greater than 90% on heated high flow  -Pulmonology following with recommendation  -We will start on empiric antibiotics with ceftriaxone and azithromycin      Atrial fibrillation with RVR (HCC) and cardiomyopathy: Noted tachycardia this morning with rate in the 130s. Started on diltiazem drip by cardiology. On amiodarone and Eliquis.  -Continue telemetry  -Need further work-up once acute illness settled to assess cardiomyopathy      Hypokalemia: Replete and monitor    Active Problems:    HTN (hypertension): Stable on diltiazem. Inadequate nutrition: Poor oral intake. Patient has had intermittent episodes of hypoglycemia yesterday. Improved this morning. Refused lunch today. Await palliative consult. Type 2 diabetes mellitus ([address]):  On sliding scale insulin      Resolved Problems:    Septic shock (HCC)    Acute metabolic encephalopathy          Medications:  Reviewed  Infusion Medications    dilTIAZem (CARDIZEM) 125 mg in dextrose 5% 125 mL infusion 5 mg/hr (11/19/21 0608)    dextrose 50 mL/hr at 11/18/21 1845    norepinephrine Stopped (11/19/21 1315)    sodium chloride      dextrose      sodium chloride       Scheduled Medications    cefTRIAXone (ROCEPHIN) IV  1,000 mg IntraVENous Q24H    azithromycin  500 mg IntraVENous Q24H    magnesium oxide  400 mg Oral Daily    potassium chloride  20 mEq Oral BID    insulin glargine  30 Units SubCUTAneous Nightly    lidocaine PF  5 mL IntraDERmal Once    sodium chloride flush  5-40 mL IntraVENous 2 times per day    insulin lispro  0-12 Units SubCUTAneous 2 times per day    baricitinib  4 mg Oral Daily    insulin lispro  15 Units SubCUTAneous TID WC    insulin lispro  0-12 Units SubCUTAneous TID WC    amiodarone  200 mg Oral Daily    apixaban  5 mg Oral BID    guaiFENesin  600 mg Oral BID    pantoprazole  40 mg Oral Daily    sertraline  100 mg Oral Daily    tamsulosin  0.4 mg Oral Daily    sodium chloride flush  5-40 mL IntraVENous 2 times per day    dexamethasone  6 mg Oral Daily     PRN Meds: potassium chloride **OR** potassium alternative oral replacement **OR** potassium chloride, sodium chloride flush, sodium chloride, potassium chloride, glucose, dextrose, glucagon (rDNA), dextrose, sodium chloride flush, sodium chloride, ondansetron **OR** ondansetron, polyethylene glycol, acetaminophen **OR** acetaminophen, potassium chloride **OR** potassium alternative oral replacement **OR** potassium chloride      DVT Prophylaxis: On Eliquis  Diet: ADULT ORAL NUTRITION SUPPLEMENT; Lunch, Breakfast; Fortified Pudding Oral Supplement  ADULT ORAL NUTRITION SUPPLEMENT; Dinner; Frozen Oral Supplement  ADULT DIET; Dysphagia - Pureed; Moderately Thick (Honey); Likes chocolate flavors  Code Status: DNR-CCA    Dispo: Remains with tenuous respiratory status and continued care in hospital.  Awaiting palliative    ____________________________________________________________________________    Physical Exam:  /62   Pulse 130   Temp 99.1 °F (37.3 °C) (Oral)   Resp (!) 32   Ht 5' 9.02\" (1.753 m)   Wt 202 lb 3.2 oz (91.7 kg)   SpO2 93%   BMI 29.85 kg/m²   General appearance: No apparent distress, appears stated age and cooperative. HEENT: Normocephalic, atraumatic, MMM, No sclera icterus/conjuctival palor  Neck: Supple, no thyromegally. No jugular venous distention. Respiratory:  Normal respiratory effort. Clear to auscultation, no Rales/Wheezes/Rhonchi. On heated high flow nasal cannula  Cardiovascular: S1/S2 without murmurs, rubs or gallops. RRR  Abdomen: Soft, non-tender, non-distended, bowel sounds present. Musculoskeletal: No clubbing, cyanosis or edema bilaterally. Skin: Skin color, texture, turgor normal.  No rashes or lesions.   Neurologic:  Cranial nerves: II-XII intact, LAURO, No focal sensory/motor deficits  Psychiatric: Alert and oriented, thought content appropriate  Capillary Refill: Brisk,< 3 seconds   Peripheral Pulses: +2 palpable, equal bilaterally       Intake/Output Summary (Last 24 hours) at 11/19/2021 1316  Last data filed at 11/19/2021 3618  Gross per 24 hour   Intake 2138.91 ml   Output 1300 ml   Net 838.91 ml       Labs:   Recent Labs     11/17/21  1109 11/18/21  1253 11/19/21  0600   WBC 15.8* 15.1* 10.8*   HGB 13.5 12.2* 10.6*   HCT 40.9* 37.4* 32.4*    327 285      Recent Labs     11/17/21  1109 11/17/21  1109 11/18/21  1117 11/18/21  1117 11/18/21  1253 11/18/21  1823 11/19/21  0600      < > 139  --  141  --  140   K 3.0*   < > 3.1*   < > 3.2* 3.4* 3.0*      < > 101  --  103  --  104   CO2 25   < > 24  --  25  --  24   BUN 37*   < > 38*  --  40*  --  30*   CREATININE 0.5*   < > 0.5*  --  0.6*  --  0.5*   CALCIUM 8.2*   < > 7.9*  --  7.8*  --  7.6*   PHOS 2.9  --   --   --  2.7  --  2.0*   AST 29  --   --   --  44*  --  27   ALT 13  --   --   --  19  --  15   BILIDIR 0.3  --   --   --  0.3  --  0.3   BILITOT 0.9  --   --   --  0.7  --  0.8   ALKPHOS 95  --   --   --  93  --  93    < > = values in this interval not displayed. No results for input(s): Patrisha Meigs in the last 72 hours. Urinalysis:    Lab Results   Component Value Date    NITRU NEGATIVE 09/23/2021    WBCUA 1 09/23/2021    BACTERIA NEGATIVE 09/23/2021    RBCUA 3 09/23/2021    BLOODU MODERATE 09/23/2021    SPECGRAV 1.005 09/23/2021       Radiology:  XR CHEST PORTABLE   Preliminary Result   1. Findings of COVID pneumonia at the lung bases, left worse than right. No   significant improvement compared to recent exams of 11/16/2021 and   11/17/2021. Partial improvement compared to 11/10/2021. XR CHEST PORTABLE   Preliminary Result   1. Right IJ CVC terminating in the right atrium. No measurable pneumothorax. 2. Mildly improved right basilar opacities. Grossly stable left basilar   opacities. IR NONTUNNELED VASCULAR CATHETER > 5 YEARS   Final Result   Successful ultrasound guided non-tunneled right internal jugular vein   triple-lumen central venous catheter placement at the unit bedside.          XR CHEST PORTABLE   Final Result   Slightly increased severity of left greater than right partially   consolidative bibasilar pulmonary disease consistent with pneumonia         XR CHEST PORTABLE   Final Result   Slightly decreased but persistent pulmonary opacities could represent edema   or multifocal pneumonia         CT HEAD WO CONTRAST   Final Result   No acute intracranial abnormality. Large old right MCA infarct severe chronic microvascular disease within the   periventricular         XR CHEST PORTABLE   Final Result   1. Multifocal bilateral pneumonia in this patient with given history of   COVID-19. Mompoloki Garon Closs, MD      Please excuse brevity and/or typos. This report was transcribed using voice recognition software. Every effort was made to ensure accuracy, however, inadvertent computerized transcription errors may be present.

## 2021-11-19 NOTE — PROGRESS NOTES
Speech Language Pathology  [facility]  DEPARTMENT OF SPEECH/LANGUAGE PATHOLOGY  DAILY PROGRESS NOTE  Gustabo Cruz  11/19/2021  [id number]  Atrial fibrillation with RVR ([address] [I48.91]  Acute respiratory failure with hypoxia (HCC) [J96.01]  COVID-19 [U07.1]  Allergies   Allergen Reactions    Calmoseptine [Menthol-Zinc Oxide]     Lyrica [Pregabalin] Other (See Comments)     Eyes cross    Pradaxa [Dabigatran Etexilate Mesylate] Other (See Comments)     Headache           Pt was seen this date for dysphagia treatment. IMPRESSION AND RECOMMENDATIONS:   Pt was seen this date for dysphagia follow-up. He was seated upright in bed, alert, cooperative. Benefits from increased time throughout. Noted he remains on Airvo, 60L at 100% FiO2. He inconsistently responded to questions with head nod/shake. He was presented with his lunch tray of pureed foods/honey thick liquids and additional PO trials of nectar thick liquids via tsp/cup/straw. Oral stage moderate-severely impaired, characterized by reduced labial seal and anterior loss, very prolonged oral manipulation with slow AP transit and diffuse oral residue. Suspect premature pharyngeal entry. Suspect continued pharyngeal dysphagia with delayed swallow initiation and reduced laryngeal elevation. Intermittent coughing followed trials of nectar thick liquids only. Pt required total assistance for feeding and implementation of aspiration precautions throughout. Recommend continued pureed diet/honey thick liquids, total feed, strict aspiration precautions. SLP will follow.     GOALS (current status in bold):  Short-term Goals  Timeframe for Short-term Goals: LOS or until goals are met  Goal 1: Pt will tolerate puree diet/honey thick liquids by spoon or cup without clinical evidence of aspiration 100% Meeting, continue  Goal 2: Pt will participate in advanced trials for possible safe diet level advancement 10/10 trials Targeted, overt s/s aspiration with nectar thick liquids  Goal 3: Pt/caregivers will demonstrate comprehension of recommendations/POC Meeting, Continue      EDUCATION: discussed recommendations with RN    PAIN RATING (0-10 Scale): appears in no distress  Time in/Time out: SLP Individual Minutes  Time In: 1133  Time Out: Amade[address] 141  Minutes: 30    Visit number: [address], [place] CCC-SLP  11/19/2021  3:23 PM

## 2021-11-19 NOTE — PROGRESS NOTES
pulmonary      SUBJECTIVE:  On airvo and breathing fair     OBJECTIVE    VITALS:  BP (!) 116/43   Pulse 96   Temp 98.3 °F (36.8 °C) (Axillary)   Resp 24   Ht 5' 9.02\" (1.753 m)   Wt 202 lb 3.2 oz (91.7 kg)   SpO2 97%   BMI 29.85 kg/m²   HEAD AND FACE EXAM:  No throat injection, no active exudate,no thrush  NECK EXAM;No JVD, no masses, symmetrical  CHEST EXAM; Expansion equal and symmetrical, no masses  LUNG EXAM; Good breath sounds bilaterally. There are expiratory wheezes both lungs, there are crackles at both lung bases  CARDIOVASCULAR EXAM: Positive S1 and S2, no S3 or S4, no clicks ,no murmurs  RIGHT AND LEFT LOWER EXTRIMITY EXAM: No edema, no swelling, no inflamation            LABS   Lab Results   Component Value Date    WBC 10.8 (H) 11/19/2021    HGB 10.6 (L) 11/19/2021    HCT 32.4 (L) 11/19/2021    MCV 91.3 11/19/2021     11/19/2021     Lab Results   Component Value Date    CREATININE 0.5 (L) 11/19/2021    BUN 30 (H) 11/19/2021     11/19/2021    K 3.0 (LL) 11/19/2021     11/19/2021    CO2 24 11/19/2021     Lab Results   Component Value Date    INR 0.92 11/11/2021    PROTIME 11.8 11/11/2021          Lab Results   Component Value Date    PHOS 2.0 11/19/2021    PHOS 2.7 11/18/2021    PHOS 2.9 11/17/2021      No results for input(s): PH, PO2ART, SWH3OCI, HCO3, BEART, O2SAT in the last 72 hours. Wt Readings from Last 3 Encounters:   11/12/21 202 lb 3.2 oz (91.7 kg)   07/09/21 205 lb (93 kg)   10/18/17 184 lb 14.4 oz (83.9 kg)               ASSESMENT  Ac resp failure  covid pneumonia  Ac bronchospasm        PLAN  1. Bd rx  2. On decadron  3.  cont baricitinab  4.  Try to decrease airvo fio2    11/19/2021  Robert Anglin MD, M.D.

## 2021-11-19 NOTE — ACP (ADVANCE CARE PLANNING)
Palliative care consult. Patient is alert in bed with HF nasal pillows in place at 60L and 100% FIO2. He is very [place] but is able to follow simple commands and answer yes and no questions at this time. Called patient's wife Samantha Jin and daughter Bill Espino to discuss goals of care and code status. after introducing myself and my department Bill Espino was eager to say \"we do not want Hospice. \" Explained Palliative care vs Hospice. Bill Espino wanted me to know she is a nursing administrator so she has medical knowledge and helps her mom and dad understand. Bill Espino states that she was in yesterday to see her dad and he did not want to be placed on a ventilator. Patient's current code status is [facility]. Explained to Bill Espino that he could still be intubated as a DNRCCA. Farhana Hayden and Bill Espino are both in agreement that patient does not want intubation,shocking of the heart,or compressions. They want patient to have life sustaining medications and aggressive oxygenation but no intubation. Patient's primary RN Terra Spicer can confirm code status discussion. Called  and notified him of patient and family wishes to change code status. Will continue to follow patient and monitor progress.

## 2021-11-19 NOTE — PROGRESS NOTES
156*     [id number]:  Lab Results   Component Value Date    LABA1C 9.2 11/12/2021     High Sensitivity TSH:   Lab Results   Component Value Date    TSHHS 1.890 11/10/2021     Free T3: No results found for: FT3  Free T4:  Lab Results   Component Value Date    T4FREE 1.09 11/10/2021       XR CHEST PORTABLE   Preliminary Result   1. Right IJ CVC terminating in the right atrium. No measurable pneumothorax. 2. Mildly improved right basilar opacities. Grossly stable left basilar   opacities. IR NONTUNNELED VASCULAR CATHETER > 5 YEARS   Final Result   Successful ultrasound guided non-tunneled right internal jugular vein   triple-lumen central venous catheter placement at the unit bedside. XR CHEST PORTABLE   Final Result   Slightly increased severity of left greater than right partially   consolidative bibasilar pulmonary disease consistent with pneumonia         XR CHEST PORTABLE   Final Result   Slightly decreased but persistent pulmonary opacities could represent edema   or multifocal pneumonia         CT HEAD WO CONTRAST   Final Result   No acute intracranial abnormality. Large old right MCA infarct severe chronic microvascular disease within the   periventricular         XR CHEST PORTABLE   Final Result   1. Multifocal bilateral pneumonia in this patient with given history of   COVID-19.               Scheduled Medicines   Medications:    insulin glargine  30 Units SubCUTAneous Nightly    lidocaine PF  5 mL IntraDERmal Once    sodium chloride flush  5-40 mL IntraVENous 2 times per day    insulin lispro  0-12 Units SubCUTAneous 2 times per day    baricitinib  4 mg Oral Daily    insulin lispro  15 Units SubCUTAneous TID WC    insulin lispro  0-12 Units SubCUTAneous TID WC    amiodarone  200 mg Oral Daily    apixaban  5 mg Oral BID    guaiFENesin  600 mg Oral BID    pantoprazole  40 mg Oral Daily    sertraline  100 mg Oral Daily    tamsulosin  0.4 mg Oral Daily    sodium chloride flush  5-40 mL IntraVENous 2 times per day    dexamethasone  6 mg Oral Daily      Infusions:    dilTIAZem (CARDIZEM) 125 mg in dextrose 5% 125 mL infusion 5 mg/hr (11/18/21 1216)    dextrose 50 mL/hr at 11/18/21 1845    norepinephrine 2 mcg/min (11/18/21 1914)    sodium chloride      dextrose      sodium chloride           Objective:   Vitals: BP (!) 107/37   Pulse 88   Temp 99.1 °F (37.3 °C) (Axillary)   Resp 26   Ht 5' 9.02\" (1.753 m)   Wt 202 lb 3.2 oz (91.7 kg)   SpO2 98%   BMI 29.85 kg/m²   General appearance: alert and cooperative with exam nonverbal  Neck: no JVD or bruit  Thyroid : Normal lobes   Lungs: Has Vesicular Breath sounds   Heart:  regular rate and rhythm  Abdomen: soft, non-tender; bowel sounds normal; no masses,  no organomegaly  Musculoskeletal: Normal  Extremities: extremities normal, , no edema  Neurologic:  Awake, alert, oriented to name, place and time. Cranial nerves II-XII are grossly intact. Except nonverbal motor weakness. Sensory is intact. ,  and gait is abnormal.  Appears to be bed ridden CVA    Assessment:     Patient Active Problem List:     Cerebral infarction ([address]     HTN (hypertension)     DM (diabetes mellitus) with complications (HCC)     Hypercholesteremia     CAD (coronary artery disease)     Occlusion and stenosis of carotid artery     Stroke, acute, within 8 weeks     Hemiparesis, left (HCC)     Dysphagia     Aphasia     Pneumonia due to organism     Leukocytosis     Cholecystitis     Sepsis ([address]     WD-Osteomyelitis of great toe of left foot ([address]     Diabetic ulcer of left foot associated with diabetes mellitus due to underlying condition, with fat layer exposed ([address]     COVID-19      Plan:     1. Reviewed POC blood glucose . Labs and X ray results   2. Reviewed Current Medicines   3. On Correction bolus Humalog/ Basal Lantus Insulin regime   4. Monitor Blood glucose frequently   5. Modified  the dose of Insulin/ other medicines as needed   6.  Patient refused for to be intubated and go on ventilator  7. Will follow     .      Hiren Greene MD, MD

## 2021-11-19 NOTE — PROGRESS NOTES
Daily Progress Note     Pt. Awake, not in any acute distress-on high flow O2  HR stable, BP stable, appears to be AF again this am  Patient is hypoxic- sating 89 on high flow O2    More awake more stable  Remain in afib rate in 100's on cardizem drip  Not eating much-blood sugar improved with dextrose infusion and low dose pressor  covid --  Check xray   Hx of CAD and CABG --and hx of CVA   Correct K -keep K around 4.0 and mag 1.9--start on oral K and oral mag   He may need an NG if not taking oral meds   CVP is not reading accurately   Prognosis is guarded       Covid 19 pneumonia     On decadron and baricitinib    Treatment per primary care    Pulmonology following-appears stable overall- sating in the high 80's on high flow O2 this am     Afib with RVR    New onset    Amio, and eliquis started    rate better controlled this am- on cardizem drip    K down 3.0 this am-replacing-needs rechecked again today-discussed with RN      Cardiomyopathy    EF 30%, last echo was 50% in 2015    BNP trending down    Will need ischemic w/u once recovered from infection    Will also optimize medications as able-add aldactone today as K is low     Hx of CVA  Will follow      Echo 11/11/2021 Summary   Expedited imaging was used to obtain protocol images to reduce the   sonographer's exposure during COVID-19 pandemic.   Left ventricular systolic function is abnormal.   Ejection fraction is visually estimated at 30%.  Very frequent PVC's (up to 10 in a row noted).    Mild left ventricular hypertrophy.   Grade I diastolic dysfunction.   Sclerotic, but non-stenotic aortic valve.   Mild mitral and tricuspid regurgitation.   No evidence of any pericardial effusion        ACE/ ARB: no D/t HOTN- on pressor  Can this be changed to ARNI: No, d/t HOTN     B-blocker No d/t HOTN- on pressor     Persistently symptomatic AA with NYHA class III-IV  EF < 35 despite being on ACE/ ARB/ARNI: No  hydralazine + Nitrate No     Loop Diuretic No     NYHA class II-IV with eGFR >30/mil/min/173.m2 and K <5.0 mEq/l   mineralcorctcoid receptor antagonist (aldactone/ eplerenone) in addition to ACE or ARB and in conjunction with B blocker no d/t HOTN     HR greater than 70 with patient with LVEF  < 35 Yes  Able to use Ivabradine No, d/t AF     Will add farxiga once optimized on other meds     PAST MEDICAL HISTORY:  History of a stroke, left-sided.  History of  hypertension, hyperlipidemia, diabetes, coronary artery disease status  post 5-vessel bypass surgery done.  I do not know his graft.  Dysphagia,  pneumonia, UTI, kidney stones, and cholecystitis present.     PAST SURGICAL HISTORY:  History of a 5-vessel bypass surgery done. History of gallbladder removed, and also I think he has a partial  pancreatectomy done.     SOCIAL HISTORY:  He does not smoke and does not drink.     ALLERGIES:  His allergies are three medications, MENTHOL, ZINC OXIDE,  LYRICA, and PRADAXA.     MEDICATIONS AT HOME:  Prior to the hospitalization, he was on Bactrim,  Percocet, Protonix, Zoloft, Desyrel, and Pravachol.       Objective:   BP (!) 126/52   Pulse 84   Temp 99.1 °F (37.3 °C) (Oral)   Resp 27   Ht 5' 9.02\" (1.753 m)   Wt 202 lb 3.2 oz (91.7 kg)   SpO2 93%   BMI 29.85 kg/m²     Intake/Output Summary (Last 24 hours) at 11/19/2021 0907  Last data filed at 11/19/2021 0559  Gross per 24 hour   Intake 2148.91 ml   Output 1300 ml   Net 848.91 ml       Medications:   Scheduled Meds:   insulin glargine  30 Units SubCUTAneous Nightly    lidocaine PF  5 mL IntraDERmal Once    sodium chloride flush  5-40 mL IntraVENous 2 times per day    insulin lispro  0-12 Units SubCUTAneous 2 times per day    baricitinib  4 mg Oral Daily    insulin lispro  15 Units SubCUTAneous TID WC    insulin lispro  0-12 Units SubCUTAneous TID WC    amiodarone  200 mg Oral Daily    apixaban  5 mg Oral BID    guaiFENesin  600 mg Oral BID    pantoprazole  40 mg Oral Daily    sertraline  100 mg Oral Daily    tamsulosin  0.4 mg Oral Daily    sodium chloride flush  5-40 mL IntraVENous 2 times per day    dexamethasone  6 mg Oral Daily      Infusions:   dilTIAZem (CARDIZEM) 125 mg in dextrose 5% 125 mL infusion 5 mg/hr (11/19/21 7387)    dextrose 50 mL/hr at 11/18/21 1845    norepinephrine 4 mcg/min (11/18/21 2203)    sodium chloride      dextrose      sodium chloride        PRN Meds:  potassium chloride **OR** potassium alternative oral replacement **OR** potassium chloride, sodium chloride flush, sodium chloride, potassium chloride, glucose, dextrose, glucagon (rDNA), dextrose, sodium chloride flush, sodium chloride, ondansetron **OR** ondansetron, polyethylene glycol, acetaminophen **OR** acetaminophen, potassium chloride **OR** potassium alternative oral replacement **OR** potassium chloride       Physical Exam:  Vitals:    11/19/21 0831   BP: (!) 126/52   Pulse:    Resp:    Temp: 99.1 °F (37.3 °C)   SpO2:         General: AAO, NAD  Chest: Nontender  Cardiac: First and Second Heart Sounds are Normal, No Murmurs or Gallops noted  Lungs:Clear to auscultation and percussion. Abdomen: Soft, NT, ND, +BS  Extremities: No clubbing, no edema  Vascular:  Equal 2+ peripheral pulses. Lab Data:  CBC:   Recent Labs     11/17/21  1109 11/18/21  1253 11/19/21  0600   WBC 15.8* 15.1* 10.8*   HGB 13.5 12.2* 10.6*   HCT 40.9* 37.4* 32.4*   MCV 90.9 90.3 91.3    327 285     BMP:   Recent Labs     11/17/21  1109 11/17/21  1109 11/18/21  1117 11/18/21  1117 11/18/21  1253 11/18/21  1823 11/19/21  0600      < > 139  --  141  --  140   K 3.0*   < > 3.1*   < > 3.2* 3.4* 3.0*      < > 101  --  103  --  104   CO2 25   < > 24  --  25  --  24   PHOS 2.9  --   --   --  2.7  --  2.0*   BUN 37*   < > 38*  --  40*  --  30*   CREATININE 0.5*   < > 0.5*  --  0.6*  --  0.5*    < > = values in this interval not displayed.      LIVER PROFILE:   Recent Labs     11/17/21  1109 11/18/21  1253 11/19/21  0600   AST 29 44* 27 ALT 13 19 15   BILIDIR 0.3 0.3 0.3   BILITOT 0.9 0.7 0.8   ALKPHOS 95 93 93     PT/INR: No results for input(s): PROTIME, INR in the last 72 hours. APTT: No results for input(s): APTT in the last 72 hours. BNP:  No results for input(s): BNP in the last 72 hours.       Assessment:  Patient Active Problem List    Diagnosis Date Noted    Cerebral infarction ([address] 03/21/2012    Occlusion and stenosis of carotid artery 03/24/2012    HTN (hypertension) 03/21/2012    DM (diabetes mellitus) with complications ([address] 69/86/4828    CAD (coronary artery disease) 03/21/2012    Hypercholesteremia 03/21/2012    Acute respiratory failure with hypoxia ([address] 11/16/2021    Atrial fibrillation with RVR ([address] 11/16/2021    Inadequate nutrition 11/16/2021    Septic shock ([address] 11/16/2021    Acute metabolic encephalopathy 38/57/5512    Type 2 diabetes mellitus ([address] 11/16/2021    Hypokalemia 11/16/2021    Cardiomyopathy ([address] 11/16/2021    Pneumonia due to COVID-19 virus 11/10/2021    Diabetic ulcer of left foot associated with diabetes mellitus due to underlying condition, with fat layer exposed ([address] 07/13/2021    WD-Osteomyelitis of great toe of left foot ([address] 05/04/2021    Sepsis ([address] 07/11/2016    Cholecystitis 03/29/2015    Pneumonia due to organism 02/18/2015    Leukocytosis 02/18/2015    Stroke, acute, within 8 weeks 03/29/2012    Hemiparesis, left ([address] 03/29/2012    Dysphagia 03/29/2012    Aphasia 03/29/2012       Electronically signed by Claudette Barrett PA-C on 11/19/2021 at 9:07 AM    Electronically signed by Nicole Garcia MD on 11/19/21 at 10:36 AM EST

## 2021-11-20 NOTE — PROGRESS NOTES
pulmonary      SUBJECTIVE: n o [id number]     OBJECTIVE    VITALS:  /62   Pulse 123   Temp 97.6 °F (36.4 °C) (Axillary)   Resp 26   Ht 5' 9.02\" (1.753 m)   Wt 202 lb 3.2 oz (91.7 kg)   SpO2 93%   BMI 29.85 kg/m²   HEAD AND FACE EXAM:  No throat injection, no active exudate,no thrush  NECK EXAM;No JVD, no masses, symmetrical  CHEST EXAM; Expansion equal and symmetrical, no masses  LUNG EXAM; Good breath sounds bilaterally. There are expiratory wheezes both lungs, there are crackles at both lung bases  CARDIOVASCULAR EXAM: Positive S1 and S2, no S3 or S4, no clicks ,no murmurs  RIGHT AND LEFT LOWER EXTRIMITY EXAM: No edema, no swelling, no inflamation            LABS   Lab Results   Component Value Date    WBC 11.9 (H) 11/20/2021    HGB 10.4 (L) 11/20/2021    HCT 31.5 (L) 11/20/2021    MCV 90.8 11/20/2021     11/20/2021     Lab Results   Component Value Date    CREATININE 0.4 (L) 11/20/2021    BUN 22 11/20/2021     (L) 11/20/2021    K 3.8 11/20/2021    CL 99 11/20/2021    CO2 24 11/20/2021     Lab Results   Component Value Date    INR 0.92 11/11/2021    PROTIME 11.8 11/11/2021          Lab Results   Component Value Date    PHOS 1.8 11/20/2021    PHOS 2.0 11/19/2021    PHOS 2.7 11/18/2021      No results for input(s): PH, PO2ART, PSE8BAX, HCO3, BEART, O2SAT in the last 72 hours. Wt Readings from Last 3 Encounters:   07/09/21 205 lb (93 kg)   10/18/17 184 lb 14.4 oz (83.9 kg)   07/12/16 211 lb 12.8 oz (96.1 kg)               ASSESMENT  Ac resp failure  covid pneumonia  Ac bronchospasdm        PLAN  1. cpm  2. Cont o2  3.  On decadron and baricitinab    11/20/2021  Radha Combs MD, M.D.

## 2021-11-20 NOTE — PROGRESS NOTES
Progress Note( [facility])  11/20/2021  Subjective:   Admit Date: 11/10/2021  PCP: Sima Coto MD    Admitted For :Shortness of breath hypoxia on home pulse oximeter  Patient is nonverbal    Consulted For:  Better control of blood glucoseHistory:    Interval history :appears somewhat worse patient is non verbal still  Patient was placed on BiPAP for better oxygen saturation  Patient's respiratory status stable barely eating  Denies any chest pains,   Yes SOB . Off BiPAP now   denies nausea or vomiting. Barely eating  Patient is CODE STATUS change only to have have resuscitation with drugs only   No ventilator/no defibrillation or cardioversion//no chest compressions    No new bowel or bladder symptoms. Intake/Output Summary (Last 24 hours) at 11/20/2021 1231  Last data filed at 11/20/2021 0616  Gross per 24 hour   Intake 3149.32 ml   Output 1075 ml   Net 2074.32 ml       DATA    CBC:   Recent Labs     11/18/21  1253 11/19/21  0600 11/20/21  0315   WBC 15.1* 10.8* 11.9*   HGB 12.2* 10.6* 10.4*    285 243    CMP:  Recent Labs     11/18/21  1253 11/18/21  1823 11/19/21  0600 11/19/21  1800 11/20/21  0315     --  140  --  133*   K 3.2*   < > 3.0* 3.8 3.8     --  104  --  99   CO2 25  --  24  --  24   BUN 40*  --  30*  --  22   CREATININE 0.6*  --  0.5*  --  0.4*   CALCIUM 7.8*  --  7.6*  --  7.2*   PROT 5.2*  --  5.2*  --  4.9*   LABALBU 2.4*  2.4*  --  2.7*  2.7*  --  2.5*  2.5*   BILITOT 0.7  --  0.8  --  0.7   ALKPHOS 93  --  93  --  98   AST 44*  --  27  --  31   ALT 19  --  15  --  18    < > = values in this interval not displayed.      Lipids:   Lab Results   Component Value Date    CHOL 159 07/13/2020    HDL 41 07/13/2020    TRIG 125 07/13/2020     Glucose:  Recent Labs     11/19/21  2046 11/20/21  0157 11/20/21  0757   POCGLU 250* 305* 233*     [id number]:  Lab Results   Component Value Date    LABA1C 9.2 11/12/2021     High Sensitivity TSH:   Lab Results   Component Value Date    TSHHS 1.890 11/10/2021     Free T3: No results found for: FT3  Free T4:  Lab Results   Component Value Date    T4FREE 1.09 11/10/2021       XR CHEST PORTABLE   Preliminary Result   1. Findings of COVID pneumonia at the lung bases, left worse than right. No   significant improvement compared to recent exams of 11/16/2021 and   11/17/2021. Partial improvement compared to 11/10/2021. XR CHEST PORTABLE   Preliminary Result   1. Right IJ CVC terminating in the right atrium. No measurable pneumothorax. 2. Mildly improved right basilar opacities. Grossly stable left basilar   opacities. IR NONTUNNELED VASCULAR CATHETER > 5 YEARS   Final Result   Successful ultrasound guided non-tunneled right internal jugular vein   triple-lumen central venous catheter placement at the unit bedside. XR CHEST PORTABLE   Final Result   Slightly increased severity of left greater than right partially   consolidative bibasilar pulmonary disease consistent with pneumonia         XR CHEST PORTABLE   Final Result   Slightly decreased but persistent pulmonary opacities could represent edema   or multifocal pneumonia         CT HEAD WO CONTRAST   Final Result   No acute intracranial abnormality. Large old right MCA infarct severe chronic microvascular disease within the   periventricular         XR CHEST PORTABLE   Final Result   1. Multifocal bilateral pneumonia in this patient with given history of   COVID-19.               Scheduled Medicines   Medications:    potassium phosphate IVPB  15 mmol IntraVENous Once    insulin lispro  0-18 Units SubCUTAneous TID WC    insulin lispro  0-18 Units SubCUTAneous Nightly    insulin NPH  15 Units SubCUTAneous QAM    dilTIAZem  60 mg Oral TID    cefTRIAXone (ROCEPHIN) IV  1,000 mg IntraVENous Q24H    azithromycin  500 mg IntraVENous Q24H    magnesium oxide  400 mg Oral Daily    potassium chloride  20 mEq Oral BID    insulin glargine  30 Units SubCUTAneous Nightly    lidocaine PF  5 mL IntraDERmal Once    sodium chloride flush  5-40 mL IntraVENous 2 times per day    baricitinib  4 mg Oral Daily    amiodarone  200 mg Oral Daily    apixaban  5 mg Oral BID    guaiFENesin  600 mg Oral BID    pantoprazole  40 mg Oral Daily    sertraline  100 mg Oral Daily    tamsulosin  0.4 mg Oral Daily    sodium chloride flush  5-40 mL IntraVENous 2 times per day      Infusions:    sodium chloride 25 mL (11/20/21 1219)    dextrose      sodium chloride           Objective:   Vitals: /62   Pulse 123   Temp 97.6 °F (36.4 °C) (Axillary)   Resp 28   Ht 5' 9.02\" (1.753 m)   Wt 202 lb 3.2 oz (91.7 kg)   SpO2 95%   BMI 29.85 kg/m²   General appearance: alert and cooperative with exam nonverbal  Neck: no JVD or bruit  Thyroid : Normal lobes   Lungs: Has Vesicular Breath sounds   Heart:  regular rate and rhythm  Abdomen: soft, non-tender; bowel sounds normal; no masses,  no organomegaly  Musculoskeletal: Normal  Extremities: extremities normal, , no edema  Neurologic:  Awake, alert, oriented to name, place and time. Cranial nerves II-XII are grossly intact. Except nonverbal motor weakness. Sensory is intact. ,  and gait is abnormal.  Appears to be bed ridden CVA    Assessment:     Patient Active Problem List:     Cerebral infarction ([address]     HTN (hypertension)     DM (diabetes mellitus) with complications (HCC)     Hypercholesteremia     CAD (coronary artery disease)     Occlusion and stenosis of carotid artery     Stroke, acute, within 8 weeks     Hemiparesis, left (HCC)     Dysphagia     Aphasia     Pneumonia due to organism     Leukocytosis     Cholecystitis     Sepsis ([address]     WD-Osteomyelitis of great toe of left foot ([address]     Diabetic ulcer of left foot associated with diabetes mellitus due to underlying condition, with fat layer exposed ([address]     COVID-19      Plan:     1. Reviewed POC blood glucose . Labs and X ray results   2.  Reviewed Current Medicines 3. On Correction bolus Humalog/ Basal Lantus Insulin regime   4. Monitor Blood glucose frequently   5. Modified  the dose of Insulin/ other medicines as needed   6. Patient refused for to be intubated and go on ventilator  7. Will follow     .      Stephanie Garland MD, MD

## 2021-11-20 NOTE — PROGRESS NOTES
11/20/21 1624   Oxygen Therapy/Pulse Ox   O2 Therapy Oxygen humidified   O2 Device Heated high flow cannula   O2 Flow Rate (L/min) 60 L/min   FiO2  90 %   Resp 26   SpO2 96 %   Equipment Changed Humidification   Humidification Source Heated wire   Humidification Temp 31   Humidification Temp Measured 31   Circuit Condensation Drained   Pulse Oximeter Device Mode Continuous   Pulse Oximeter Device Location Forehead   $Pulse Oximeter $Spot check (multiple/continuous)   Blood Gas  Performed?  No

## 2021-11-20 NOTE — PROGRESS NOTES
potassium chloride      DVT Prophylaxis: Eliquis  Diet: ADULT ORAL NUTRITION SUPPLEMENT; Lunch, Breakfast; Fortified Pudding Oral Supplement  ADULT ORAL NUTRITION SUPPLEMENT; Dinner; Frozen Oral Supplement  ADULT DIET; Dysphagia - Pureed; Moderately Thick (Honey); Likes chocolate flavors  Code Status: Limited    Dispo: Remains with tenuous respiratory status. Not yet ready for home    ____________________________________________________________________________    Physical Exam:  /62   Pulse 123   Temp 97.6 °F (36.4 °C) (Axillary)   Resp 28   Ht 5' 9.02\" (1.753 m)   Wt 202 lb 3.2 oz (91.7 kg)   SpO2 95%   BMI 29.85 kg/m²   General appearance: No apparent distress, appears stated age and cooperative. On heated high flow nasal cannula  HEENT: Normocephalic, atraumatic, MMM, No sclera icterus/conjuctival palor  Neck: Supple, no thyromegally. No jugular venous distention. Respiratory:  Normal respiratory effort. Clear to auscultation, no Rales/Wheezes/Rhonchi. Cardiovascular: S1/S2 without murmurs, rubs or gallops. RRR  Abdomen: Soft, non-tender, non-distended, bowel sounds present. Musculoskeletal: No clubbing, cyanosis or edema bilaterally. Skin: Skin color, texture, turgor normal.  No rashes or lesions.   Neurologic:  Cranial nerves: II-XII intact, LAURO, No focal sensory/motor deficits  Psychiatric: Alert and oriented, thought content appropriate  Capillary Refill: Brisk,< 3 seconds   Peripheral Pulses: +2 palpable, equal bilaterally       Intake/Output Summary (Last 24 hours) at 11/20/2021 1134  Last data filed at 11/20/2021 0616  Gross per 24 hour   Intake 3149.32 ml   Output 1075 ml   Net 2074.32 ml       Labs:   Recent Labs     11/18/21  1253 11/19/21  0600 11/20/21  0315   WBC 15.1* 10.8* 11.9*   HGB 12.2* 10.6* 10.4*   HCT 37.4* 32.4* 31.5*    285 243      Recent Labs     11/18/21  1253 11/18/21  1823 11/19/21  0600 11/19/21  1800 11/20/21  0315     --  140  --  133*   K 3.2* < > 3.0* 3.8 3.8     --  104  --  99   CO2 25  --  24  --  24   BUN 40*  --  30*  --  22   CREATININE 0.6*  --  0.5*  --  0.4*   CALCIUM 7.8*  --  7.6*  --  7.2*   PHOS 2.7  --  2.0*  --  1.8*   AST 44*  --  27  --  31   ALT 19  --  15  --  18   BILIDIR 0.3  --  0.3  --  0.3   BILITOT 0.7  --  0.8  --  0.7   ALKPHOS 93  --  93  --  98    < > = values in this interval not displayed. No results for input(s): Freeman Renee in the last 72 hours. Urinalysis:    Lab Results   Component Value Date    NITRU NEGATIVE 09/23/2021    WBCUA 1 09/23/2021    BACTERIA NEGATIVE 09/23/2021    RBCUA 3 09/23/2021    BLOODU MODERATE 09/23/2021    SPECGRAV 1.005 09/23/2021       Radiology:  XR CHEST PORTABLE   Preliminary Result   1. Findings of COVID pneumonia at the lung bases, left worse than right. No   significant improvement compared to recent exams of 11/16/2021 and   11/17/2021. Partial improvement compared to 11/10/2021. XR CHEST PORTABLE   Preliminary Result   1. Right IJ CVC terminating in the right atrium. No measurable pneumothorax. 2. Mildly improved right basilar opacities. Grossly stable left basilar   opacities. IR NONTUNNELED VASCULAR CATHETER > 5 YEARS   Final Result   Successful ultrasound guided non-tunneled right internal jugular vein   triple-lumen central venous catheter placement at the unit bedside. XR CHEST PORTABLE   Final Result   Slightly increased severity of left greater than right partially   consolidative bibasilar pulmonary disease consistent with pneumonia         XR CHEST PORTABLE   Final Result   Slightly decreased but persistent pulmonary opacities could represent edema   or multifocal pneumonia         CT HEAD WO CONTRAST   Final Result   No acute intracranial abnormality. Large old right MCA infarct severe chronic microvascular disease within the   periventricular         XR CHEST PORTABLE   Final Result   1.  Multifocal bilateral pneumonia in this patient with given history of   COVID-19. Rick Thomas MD      Please excuse brevity and/or typos. This report was transcribed using voice recognition software. Every effort was made to ensure accuracy, however, inadvertent computerized transcription errors may be present.

## 2021-11-20 NOTE — PROGRESS NOTES
Daily Progress Note    Pt. Awake, not in any acute distress-on high flow O2  HR elevated this am, BP stable, in AF rate 120   afib on cardizem drip will change to oral cardizem for rate control and AC  covid slow improvement  Blood sugars are elevated on dextrose-per endo  Supportive care for now--making a slow progress  Labs improved off pressors   Prognosis is guarded     Covid 19 pneumonia     Treatment per primary care    Pulmonology following-appears stable overall- sating in the high 80's on high flow O2 this am- he does not want intubation or BiPAP per notes     Afib with RVR    New onset    Amio, and eliquis started    rate better controlled this am- on cardizem drip    He is taking PO meds agin- will transition from cardizem drip to PO cardizem today and watch rate      Cardiomyopathy    EF 30%, last echo was 50% in 2015    BNP trending down    Will need ischemic w/u once recovered from infection    Will also optimize medications as able     Glucose is improved- need to be careful with dextrose as na slightly low today- watch for fluid overload  Hx of CVA  Will follow      Echo 11/11/2021 Summary   Expedited imaging was used to obtain protocol images to reduce the   sonographer's exposure during COVID-19 pandemic.   Left ventricular systolic function is abnormal.   Ejection fraction is visually estimated at 30%.  Very frequent PVC's (up to 10 in a row noted).    Mild left ventricular hypertrophy.   Grade I diastolic dysfunction.   Sclerotic, but non-stenotic aortic valve.   Mild mitral and tricuspid regurgitation.   No evidence of any pericardial effusion        ACE/ ARB: no D/t HOTN- on pressor  Can this be changed to ARNI: No, d/t HOTN     B-blocker No d/t HOTN- will try cardizem initially for rate control and transition to BB once controlled     Persistently symptomatic AA with NYHA class III-IV  EF < 35 despite being on ACE/ ARB/ARNI: No  hydralazine + Nitrate No     Loop Diuretic No     NYHA class IntraVENous 2 times per day    baricitinib  4 mg Oral Daily    amiodarone  200 mg Oral Daily    apixaban  5 mg Oral BID    guaiFENesin  600 mg Oral BID    pantoprazole  40 mg Oral Daily    sertraline  100 mg Oral Daily    tamsulosin  0.4 mg Oral Daily    sodium chloride flush  5-40 mL IntraVENous 2 times per day    dexamethasone  6 mg Oral Daily      Infusions:   dilTIAZem (CARDIZEM) 125 mg in dextrose 5% 125 mL infusion 5 mg/hr (11/20/21 0610)    dextrose 50 mL/hr at 11/19/21 2345    norepinephrine Stopped (11/19/21 1315)    sodium chloride      dextrose      sodium chloride        PRN Meds:  potassium chloride **OR** potassium alternative oral replacement **OR** potassium chloride, sodium chloride flush, sodium chloride, potassium chloride, glucose, dextrose, glucagon (rDNA), dextrose, sodium chloride flush, sodium chloride, ondansetron **OR** ondansetron, polyethylene glycol, acetaminophen **OR** acetaminophen, potassium chloride **OR** potassium alternative oral replacement **OR** potassium chloride       Physical Exam:  Vitals:    11/20/21 0359   BP: (!) 126/57   Pulse:    Resp:    Temp: 97.7 °F (36.5 °C)   SpO2:         General: AAO, NAD  Chest: Nontender  Cardiac: First and Second Heart Sounds are Normal, No Murmurs or Gallops noted  Lungs:Clear to auscultation and percussion. Abdomen: Soft, NT, ND, +BS  Extremities: No clubbing, no edema  Vascular:  Equal 2+ peripheral pulses.         Lab Data:  CBC:   Recent Labs     11/18/21  1253 11/19/21  0600 11/20/21  0315   WBC 15.1* 10.8* 11.9*   HGB 12.2* 10.6* 10.4*   HCT 37.4* 32.4* 31.5*   MCV 90.3 91.3 90.8    285 243     BMP:   Recent Labs     11/18/21  1253 11/18/21  1823 11/19/21  0600 11/19/21  1800 11/20/21  0315     --  140  --  133*   K 3.2*   < > 3.0* 3.8 3.8     --  104  --  99   CO2 25  --  24  --  24   PHOS 2.7  --  2.0*  --  1.8*   BUN 40*  --  30*  --  22   CREATININE 0.6*  --  0.5*  --  0.4*    < > = values in this interval not displayed. LIVER PROFILE:   Recent Labs     11/18/21  1253 11/19/21  0600 11/20/21  0315   AST 44* 27 31   ALT 19 15 18   BILIDIR 0.3 0.3 0.3   BILITOT 0.7 0.8 0.7   ALKPHOS 93 93 98     PT/INR: No results for input(s): PROTIME, INR in the last 72 hours. APTT: No results for input(s): APTT in the last 72 hours. BNP:  No results for input(s): BNP in the last 72 hours.       Assessment:  Patient Active Problem List    Diagnosis Date Noted    Cerebral infarction ([address] 03/21/2012    Occlusion and stenosis of carotid artery 03/24/2012    HTN (hypertension) 03/21/2012    DM (diabetes mellitus) with complications ([address] 77/04/6184    CAD (coronary artery disease) 03/21/2012    Hypercholesteremia 03/21/2012    Acute respiratory failure with hypoxia ([address] 11/16/2021    Atrial fibrillation with RVR ([address] 11/16/2021    Inadequate nutrition 11/16/2021    Septic shock ([address] 11/16/2021    Acute metabolic encephalopathy 18/87/4290    Type 2 diabetes mellitus ([address] 11/16/2021    Hypokalemia 11/16/2021    Cardiomyopathy ([address] 11/16/2021    Pneumonia due to COVID-19 virus 11/10/2021    Diabetic ulcer of left foot associated with diabetes mellitus due to underlying condition, with fat layer exposed ([address] 07/13/2021    WD-Osteomyelitis of great toe of left foot ([address] 05/04/2021    Sepsis ([address] 07/11/2016    Cholecystitis 03/29/2015    Pneumonia due to organism 02/18/2015    Leukocytosis 02/18/2015    Stroke, acute, within 8 weeks 03/29/2012    Hemiparesis, left ([address] 03/29/2012    Dysphagia 03/29/2012    Aphasia 03/29/2012       Electronically signed by Natalia Singer PA-C on 11/20/2021 at 8:30 AM     Electronically signed by Juju Vaca MD on 11/20/21 at 9:51 AM EST

## 2021-11-20 NOTE — PROGRESS NOTES
Comprehensive Nutrition Assessment    Type and Reason for Visit:  Reassess    Nutrition Recommendations/Plan:   · Continue current diet and supplements  · Please start an appetite supplements    Nutrition Assessment: Pt intake continues to be varied over the past few days but he is consuming 100% of his supplements. Pt may benefit from an appetite stimulant at this time for 7 days to help kick off his desire to consume food PO     Malnutrition Assessment:  Malnutrition Status:  Insufficient data    Context:  Chronic Illness       Estimated Daily Nutrient Needs:  Energy (kcal):  6268-8457 ([address] w/ stress factor 1.0-1.2); Weight Used for Energy Requirements:  Current     Protein (g):  73-88 (1.0-1.2 g/kg); Weight Used for Protein Requirements:  Ideal        Fluid (ml/day):  2355-6123; Method Used for Fluid Requirements:  1 ml/kcal      Nutrition Related Findings:  Meds: Decadron      Wounds:   (left lateral foot wound noted)       Current Nutrition Therapies:    ADULT ORAL NUTRITION SUPPLEMENT; Lunch, Breakfast; Fortified Pudding Oral Supplement  ADULT ORAL NUTRITION SUPPLEMENT; Dinner; Frozen Oral Supplement  ADULT DIET; Dysphagia - Pureed; Moderately Thick (Honey); Likes chocolate flavors    Anthropometric Measures:  · Height: 5' 9.02\" (175.3 cm)  · Current Body Weight: 202 lb 2.6 oz (91.7 kg)   · Admission Body Weight:  (n/a)    · Usual Body Weight: 205 lb (93 kg) (7/9/21)     · Ideal Body Weight: 160 lbs; % Ideal Body Weight 126.4 %   · BMI: 29.8  · BMI Categories: Overweight (BMI 25.0-29. 9)       Nutrition Diagnosis:   · Inadequate oral intake related to impaired respiratory function, acute injury/trauma as evidenced by  (varied po intake documented in the past 72 hr)      Nutrition Interventions:   Food and/or Nutrient Delivery:  Continue Current Diet, Continue Oral Nutrition Supplement  Nutrition Education/Counseling:  No recommendation at this time   Coordination of Nutrition Care:  Continue to monitor while inpatient    Goals:  Pt will be able to meet greater than 50% of estimated energy needs via po intake or EN will be initiated to provide adequate nutrition       Nutrition Monitoring and Evaluation:   Behavioral-Environmental Outcomes:  None Identified   Food/Nutrient Intake Outcomes:  Food and Nutrient Intake, Supplement Intake  Physical Signs/Symptoms Outcomes:  Biochemical Data, GI Status, Hemodynamic Status, Fluid Status or Edema, Weight, Skin     Discharge Planning:     Too soon to determine     Electronically signed by Mell Dubose RD, LD on 98/04/36 at 8:24 PM EST    Contact: [phone]

## 2021-11-21 NOTE — PROGRESS NOTES
pulmonary      SUBJECTIVE: remains on high flow     OBJECTIVE    VITALS:  BP (!) 108/52   Pulse 112   Temp 96.6 °F (35.9 °C) (Axillary)   Resp 24   Ht 5' 9.02\" (1.753 m)   Wt 202 lb 3.2 oz (91.7 kg)   SpO2 92%   BMI 29.85 kg/m²   HEAD AND FACE EXAM:  No throat injection, no active exudate,no thrush  NECK EXAM;No JVD, no masses, symmetrical  CHEST EXAM; Expansion equal and symmetrical, no masses  LUNG EXAM; Good breath sounds bilaterally. There are expiratory wheezes both lungs, there are crackles at both lung bases  CARDIOVASCULAR EXAM: Positive S1 and S2, no S3 or S4, no clicks ,no murmurs  RIGHT AND LEFT LOWER EXTRIMITY EXAM: No edema, no swelling, no inflamation            LABS   Lab Results   Component Value Date    WBC 10.6 (H) 11/21/2021    HGB 10.5 (L) 11/21/2021    HCT 31.6 (L) 11/21/2021    MCV 89.5 11/21/2021     11/21/2021     Lab Results   Component Value Date    CREATININE 0.4 (L) 11/21/2021    BUN 25 (H) 11/21/2021     (L) 11/21/2021    K 3.9 11/21/2021     11/21/2021    CO2 24 11/21/2021     Lab Results   Component Value Date    INR 0.92 11/11/2021    PROTIME 11.8 11/11/2021          Lab Results   Component Value Date    PHOS 2.7 11/21/2021    PHOS 1.8 11/20/2021    PHOS 2.0 11/19/2021      No results for input(s): PH, PO2ART, OSH3OUH, HCO3, BEART, O2SAT in the last 72 hours. Wt Readings from Last 3 Encounters:   07/09/21 205 lb (93 kg)   10/18/17 184 lb 14.4 oz (83.9 kg)   07/12/16 211 lb 12.8 oz (96.1 kg)               ASSESMENT  Ac rtesp failure  covid pneumonia  Ac bronchospasm      PLAN  1. cpm  2.  Cont high flow o2    11/21/2021  Bri Alves MD, M.D.

## 2021-11-21 NOTE — PROGRESS NOTES
Hospitalist Progress Note       11/21/2021 9:35 AM  Admit Date: 11/10/2021    PCP: Laurie Alexander MD     Assessment and Plan:   1. COVID-19 pneumonia with hypoxia: Completed IV dexamethasone. Currently on baricitinib (end date 11/26/2021). Patient is requiring oxygen via high flow device. His CODE STATUS is limited (no CPR, BiPAP or intubation). I doubt bacterial pneumonia-currently on IV Rocephin and Zithromax. Procalcitonin is normal.    2.  Acute metabolic encephalopathy: CT head-no acute findings. Patient probably has baseline cognitive impairment, severe hearing loss and left eye visual disturbance. He is practically bedbound after last stroke (2015) and is wheelchair dependent. Continue neurochecks. 3.  New onset of atrial fibrillation with rapid ventricular rate: Cardiology consult appreciated. Now on oral Cardizem and amiodarone for ventricular rate control. On Eliquis for stroke prophylaxis. 4.  Cardiomyopathy: EF 30%. Compensated. Cardiology note acknowledged-ischemic work-up at a later date when COVID-19 pneumonia has resolved. Keep magnesium above 2.    5.  Diabetes type 2/hypoglycemia: On Lantus, mealtime insulin and sliding scale at home. Currently on Lantus, NPH and insulin sliding scale-hypoglycemia expected now that Decadron has been completed. May consider to discontinue NPH. Endocrinology follows. 6.  Hypokalemia/hypomagnesemia: On replacement. 7.  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 DVT prophylaxis: On Eliquis  Disposition: He lives with his wife. He is wheelchair dependent. CODE STATUS: Limited (no CPR, BiPAP or intubation)    I called wife at home Yoko Steening ) to update her.     Patient Active Problem List:     Cerebral infarction ([facility]     HTN (hypertension)     DM (diabetes mellitus) with complications (HCC)     Hypercholesteremia     CAD (coronary artery disease)     Occlusion and stenosis of carotid artery     Stroke, acute, within 8 weeks     Hemiparesis, left (HCC)     Dysphagia     Aphasia     Pneumonia due to organism     Leukocytosis     Cholecystitis     Sepsis ([address]     WD-Osteomyelitis of great toe of left foot ([address]     Diabetic ulcer of left foot associated with diabetes mellitus due to underlying condition, with fat layer exposed ([address]     Pneumonia due to COVID-19 virus     Acute respiratory failure with hypoxia (HCC)     Atrial fibrillation with RVR ([address]     Inadequate nutrition     Septic shock ([address]     Acute metabolic encephalopathy     Type 2 diabetes mellitus ([facility]     Hypokalemia     Cardiomyopathy ([address]      Subjective:     Chief Complaint   Patient presents with    Positive For Covid-19     SOB, sxmfor 1 weeks, + home test yest, 77% on scene on NRB upon arrival. Home 02 at 3Lpm       F/U:  Interval History: Discussed with his nurse. Patient has not been communicating well. I called his wife on the phone: He probably has baseline cognitive impairment from stroke (ischemic stroke in 3/12 and hemorrhagic stroke in 2015) leaving him bedbound from severe left-sided weakness, severe hearing loss (left more than on the right) and left eye vision disturbance. He usually takes a long time to process the question before answering the questions. Occasionally, he will get confused. Objective: Intake/Output Summary (Last 24 hours) at 11/21/2021 0935  Last data filed at 11/21/2021 0918  Gross per 24 hour   Intake 250 ml   Output 650 ml   Net -400 ml      Vitals:   Vitals:    11/21/21 0918   BP: (!) 125/43   Pulse:    Resp:    Temp:    SpO2:      Physical Exam:  General: In mild respiratory distress. He looks chronically wasted. Eyes: Not pale. Anicteric.   Neck: No neck mass or thyromegaly  Lymph node: No cervical or supraclavicular lymphadenopathy  Neurology: Awake and alert. Severe hearing loss. He seems confused and unable to answer most questions. Left-sided weakness. Cardiovascular: Regular. No murmur no S3  Abdomen: Soft. No tenderness. No palpable mass. : Ennis catheter noted  Extremities: Trace bilateral foot edema. The feet are both warm.   Electronically signed by Maryse Crum MD on 11/21/2021 at 9:35 AM  [facility]

## 2021-11-21 NOTE — PROGRESS NOTES
Progress Note( Dr. Pond Settler)  11/21/2021  Subjective:   Admit Date: 11/10/2021  PCP: Eben Washburn MD    Admitted For :Shortness of breath hypoxia on home pulse oximeter  Patient is nonverbal    Consulted For:  Better control of blood glucoseHistory:    Interval history :appears somewhat worse patient is non verbal still  Patient was placed on BiPAP for better oxygen saturation  Patient's respiratory status stable barely eating  Denies any chest pains,   Yes SOB . Off BiPAP now ///on Vapotherm  denies nausea or vomiting. Barely eating  Patient is CODE STATUS change only to have have resuscitation with drugs only   No ventilator/no defibrillation or cardioversion//no chest compressions    No new bowel or bladder symptoms. Intake/Output Summary (Last 24 hours) at 11/21/2021 0727  Last data filed at 11/21/2021 0329  Gross per 24 hour   Intake 240 ml   Output 650 ml   Net -410 ml       DATA    CBC:   Recent Labs     11/19/21  0600 11/20/21  0315 11/21/21  0600   WBC 10.8* 11.9* 10.6*   HGB 10.6* 10.4* 10.5*    243 242    CMP:  Recent Labs     11/19/21  0600 11/19/21  0600 11/19/21  1800 11/20/21  0315 11/21/21  0600     --   --  133* 134*   K 3.0*   < > 3.8 3.8 3.9     --   --  99 100   CO2 24  --   --  24 24   BUN 30*  --   --  22 25*   CREATININE 0.5*  --   --  0.4* 0.4*   CALCIUM 7.6*  --   --  7.2* 7.6*   PROT 5.2*  --   --  4.9* 5.1*   LABALBU 2.7*  2.7*  --   --  2.5*  2.5* 2.5*  2.5*   BILITOT 0.8  --   --  0.7 0.5   ALKPHOS 93  --   --  98 124   AST 27  --   --  31 34   ALT 15  --   --  18 23    < > = values in this interval not displayed.      Lipids:   Lab Results   Component Value Date    CHOL 159 07/13/2020    HDL 41 07/13/2020    TRIG 125 07/13/2020     Glucose:  Recent Labs     11/20/21  1647 11/20/21  2133 11/21/21  0115   POCGLU 128* 246* 211*     [id number]:  Lab Results   Component Value Date    LABA1C 9.2 11/12/2021     High Sensitivity TSH:   Lab Results   Component Value Date    TSHHS 1.890 11/10/2021     Free T3: No results found for: FT3  Free T4:  Lab Results   Component Value Date    T4FREE 1.09 11/10/2021       XR CHEST PORTABLE   Preliminary Result   1. Findings of COVID pneumonia at the lung bases, left worse than right. No   significant improvement compared to recent exams of 11/16/2021 and   11/17/2021. Partial improvement compared to 11/10/2021. XR CHEST PORTABLE   Preliminary Result   1. Right IJ CVC terminating in the right atrium. No measurable pneumothorax. 2. Mildly improved right basilar opacities. Grossly stable left basilar   opacities. IR NONTUNNELED VASCULAR CATHETER > 5 YEARS   Final Result   Successful ultrasound guided non-tunneled right internal jugular vein   triple-lumen central venous catheter placement at the unit bedside. XR CHEST PORTABLE   Final Result   Slightly increased severity of left greater than right partially   consolidative bibasilar pulmonary disease consistent with pneumonia         XR CHEST PORTABLE   Final Result   Slightly decreased but persistent pulmonary opacities could represent edema   or multifocal pneumonia         CT HEAD WO CONTRAST   Final Result   No acute intracranial abnormality. Large old right MCA infarct severe chronic microvascular disease within the   periventricular         XR CHEST PORTABLE   Final Result   1. Multifocal bilateral pneumonia in this patient with given history of   COVID-19.               Scheduled Medicines   Medications:    insulin lispro  0-18 Units SubCUTAneous TID WC    insulin lispro  0-18 Units SubCUTAneous Nightly    insulin NPH  15 Units SubCUTAneous QAM    dilTIAZem  60 mg Oral TID    cefTRIAXone (ROCEPHIN) IV  1,000 mg IntraVENous Q24H    azithromycin  500 mg IntraVENous Q24H    magnesium oxide  400 mg Oral Daily    potassium chloride  20 mEq Oral BID    insulin glargine  30 Units SubCUTAneous Nightly    lidocaine PF  5 mL IntraDERmal Once    sodium chloride flush  5-40 mL IntraVENous 2 times per day    baricitinib  4 mg Oral Daily    amiodarone  200 mg Oral Daily    apixaban  5 mg Oral BID    guaiFENesin  600 mg Oral BID    pantoprazole  40 mg Oral Daily    sertraline  100 mg Oral Daily    tamsulosin  0.4 mg Oral Daily    sodium chloride flush  5-40 mL IntraVENous 2 times per day      Infusions:    sodium chloride 25 mL (11/20/21 1219)    dextrose      sodium chloride           Objective:   Vitals: BP (!) 115/50   Pulse 111   Temp 95.6 °F (35.3 °C) (Axillary)   Resp 23   Ht 5' 9.02\" (1.753 m)   Wt 202 lb 3.2 oz (91.7 kg)   SpO2 93%   BMI 29.85 kg/m²   General appearance: alert and cooperative with exam nonverbal  Neck: no JVD or bruit  Thyroid : Normal lobes   Lungs: Has Vesicular Breath sounds ./On Vapotherm  Heart:  regular rate and rhythm  Abdomen: soft, non-tender; bowel sounds normal; no masses,  no organomegaly  Musculoskeletal: Normal  Extremities: extremities normal, , no edema  Neurologic:  Awake, alert, oriented to name, place and time. Cranial nerves II-XII are grossly intact. Except nonverbal motor weakness. Sensory is intact. ,  and gait is abnormal.  Appears to be bed ridden CVA    Assessment:     Patient Active Problem List:     Cerebral infarction ([address]     HTN (hypertension)     DM (diabetes mellitus) with complications (HCC)     Hypercholesteremia     CAD (coronary artery disease)     Occlusion and stenosis of carotid artery     Stroke, acute, within 8 weeks     Hemiparesis, left (HCC)     Dysphagia     Aphasia     Pneumonia due to organism     Leukocytosis     Cholecystitis     Sepsis ([address]     WD-Osteomyelitis of great toe of left foot ([address]     Diabetic ulcer of left foot associated with diabetes mellitus due to underlying condition, with fat layer exposed ([address]     COVID-19      Plan:     1. Reviewed POC blood glucose . Labs and X ray results   2. Reviewed Current Medicines   3.  On Correction bolus Humalog/ Basal Lantus Insulin regime   4. Monitor Blood glucose frequently   5. Modified  the dose of Insulin/ other medicines as needed   6. Patient refused for to be intubated and go on ventilator  7. Will follow     .      Anca Holman MD, MD

## 2021-11-21 NOTE — PROGRESS NOTES
11/21/21 1109   Oxygen Therapy/Pulse Ox   O2 Therapy Oxygen humidified   O2 Device Heated high flow cannula   O2 Flow Rate (L/min) 60 L/min   FiO2  94 %   Resp 24   SpO2 92 %   Humidification Temp 31   Humidification Temp Measured 31   Circuit Condensation Drained   Pulse Oximeter Device Mode Continuous   Pulse Oximeter Device Location Finger   Blood Gas  Performed?  No ctxs q5

## 2021-11-21 NOTE — PROGRESS NOTES
Daily Progress Note    Pt. Awake, not in any acute distress-on high flow O2  HR elevated this am, BP stable, in AF rate 100 range   doing  Ok remain in afib rate In 100';s  He complains of being cold --his room temp at 72 degree  CAD and CABG --  covid related pneumonia   HFrEF EF 30% conservative treatment  Prognosis is guarded   He is on cardizem 90mg tid for now and AC    Covid 19 pneumonia     Treatment per primary care    Pulmonology following-appears stable overall- sating in the low 90's on high flow O2 this am- he does not want intubation or BiPAP per notes     Afib with RVR    New onset    Amio, and eliquis started    rate still elevated on PO cardizem-120-140 yesterday    Will add midodrine to help with HOTN and increase cardizem dosing for better rate control      Cardiomyopathy    EF 30%, last echo was 50% in 2015    BNP trending down    Will need ischemic w/u once recovered from infection    Will also optimize medications as able     Glucose is improved- need to be careful with dextrose as na slightly low today- watch for fluid overload  Hx of CVA  Will follow      Echo 11/11/2021 Summary   Expedited imaging was used to obtain protocol images to reduce the   sonographer's exposure during COVID-19 pandemic.   Left ventricular systolic function is abnormal.   Ejection fraction is visually estimated at 30%.  Very frequent PVC's (up to 10 in a row noted).    Mild left ventricular hypertrophy.   Grade I diastolic dysfunction.   Sclerotic, but non-stenotic aortic valve.   Mild mitral and tricuspid regurgitation.   No evidence of any pericardial effusion        ACE/ ARB: no D/t HOTN- on pressor  Can this be changed to ARNI: No, d/t HOTN     B-blocker No d/t HOTN- will try cardizem initially for rate control and transition to BB once controlled     Persistently symptomatic AA with NYHA class III-IV  EF < 35 despite being on ACE/ ARB/ARNI: No  hydralazine + Nitrate No     Loop Diuretic No     NYHA class II-IV with eGFR >30/mil/min/173.m2 and K <5.0 mEq/l   mineralcorctcoid receptor antagonist (aldactone/ eplerenone) in addition to ACE or ARB and in conjunction with B blocker no d/t HOTN     HR greater than 70 with patient with LVEF  < 35 Yes  Able to use Ivabradine No, d/t AF     Will add farxiga once optimized on other meds     PAST MEDICAL HISTORY:  History of a stroke, left-sided.  History of  hypertension, hyperlipidemia, diabetes, coronary artery disease status  post 5-vessel bypass surgery done.  I do not know his graft.  Dysphagia,  pneumonia, UTI, kidney stones, and cholecystitis present.     PAST SURGICAL HISTORY:  History of a 5-vessel bypass surgery done. History of gallbladder removed, and also I think he has a partial  pancreatectomy done.     SOCIAL HISTORY:  He does not smoke and does not drink.     ALLERGIES:  His allergies are three medications, MENTHOL, ZINC OXIDE,  LYRICA, and PRADAXA.     MEDICATIONS AT HOME:  Prior to the hospitalization, he was on Bactrim,  Percocet, Protonix, Zoloft, Desyrel, and Pravachol.       Objective:   BP (!) 104/45   Pulse 86   Temp 96.6 °F (35.9 °C) (Axillary)   Resp 18   Ht 5' 9.02\" (1.753 m)   Wt 202 lb 3.2 oz (91.7 kg)   SpO2 99%   BMI 29.85 kg/m²     Intake/Output Summary (Last 24 hours) at 11/21/2021 0914  Last data filed at 11/21/2021 0329  Gross per 24 hour   Intake 240 ml   Output 650 ml   Net -410 ml       Medications:   Scheduled Meds:   insulin lispro  0-18 Units SubCUTAneous TID WC    insulin lispro  0-18 Units SubCUTAneous Nightly    insulin NPH  15 Units SubCUTAneous QAM    dilTIAZem  60 mg Oral TID    cefTRIAXone (ROCEPHIN) IV  1,000 mg IntraVENous Q24H    azithromycin  500 mg IntraVENous Q24H    magnesium oxide  400 mg Oral Daily    potassium chloride  20 mEq Oral BID    insulin glargine  30 Units SubCUTAneous Nightly    lidocaine PF  5 mL IntraDERmal Once    sodium chloride flush  5-40 mL IntraVENous 2 times per day    baricitinib  4 mg Oral Daily    amiodarone  200 mg Oral Daily    apixaban  5 mg Oral BID    guaiFENesin  600 mg Oral BID    pantoprazole  40 mg Oral Daily    sertraline  100 mg Oral Daily    tamsulosin  0.4 mg Oral Daily    sodium chloride flush  5-40 mL IntraVENous 2 times per day      Infusions:   sodium chloride 25 mL (11/20/21 1219)    dextrose      sodium chloride        PRN Meds:  potassium chloride **OR** potassium alternative oral replacement **OR** potassium chloride, sodium chloride flush, sodium chloride, potassium chloride, glucose, dextrose, glucagon (rDNA), dextrose, sodium chloride flush, sodium chloride, ondansetron **OR** ondansetron, polyethylene glycol, acetaminophen **OR** acetaminophen, potassium chloride **OR** potassium alternative oral replacement **OR** potassium chloride       Physical Exam:  Vitals:    11/21/21 0800   BP: (!) 104/45   Pulse: 86   Resp: 18   Temp: 96.6 °F (35.9 °C)   SpO2: 99%        General: AAO, NAD  Chest: Nontender  Cardiac: First and Second Heart Sounds are Normal, No Murmurs or Gallops noted  Lungs:Clear to auscultation and percussion. Abdomen: Soft, NT, ND, +BS  Extremities: No clubbing, no edema  Vascular:  Equal 2+ peripheral pulses. Lab Data:  CBC:   Recent Labs     11/19/21  0600 11/20/21  0315 11/21/21  0600   WBC 10.8* 11.9* 10.6*   HGB 10.6* 10.4* 10.5*   HCT 32.4* 31.5* 31.6*   MCV 91.3 90.8 89.5    243 242     BMP:   Recent Labs     11/19/21  0600 11/19/21  0600 11/19/21  1800 11/20/21  0315 11/21/21  0600     --   --  133* 134*   K 3.0*   < > 3.8 3.8 3.9     --   --  99 100   CO2 24  --   --  24 24   PHOS 2.0*  --   --  1.8* 2.7   BUN 30*  --   --  22 25*   CREATININE 0.5*  --   --  0.4* 0.4*    < > = values in this interval not displayed.      LIVER PROFILE:   Recent Labs     11/19/21  0600 11/20/21  0315 11/21/21  0600   AST 27 31 34   ALT 15 18 23   BILIDIR 0.3 0.3 0.2   BILITOT 0.8 0.7 0.5   ALKPHOS 93 98 124     PT/INR: No results for input(s): PROTIME, INR in the last 72 hours. APTT: No results for input(s): APTT in the last 72 hours. BNP:  No results for input(s): BNP in the last 72 hours.       Assessment:  Patient Active Problem List    Diagnosis Date Noted    Cerebral infarction ([address] 03/21/2012    Occlusion and stenosis of carotid artery 03/24/2012    HTN (hypertension) 03/21/2012    DM (diabetes mellitus) with complications ([address] 79/80/8003    CAD (coronary artery disease) 03/21/2012    Hypercholesteremia 03/21/2012    Acute respiratory failure with hypoxia ([address] 11/16/2021    Atrial fibrillation with RVR ([address] 11/16/2021    Inadequate nutrition 11/16/2021    Septic shock ([address] 11/16/2021    Acute metabolic encephalopathy 60/29/7469    Type 2 diabetes mellitus ([address] 11/16/2021    Hypokalemia 11/16/2021    Cardiomyopathy ([address] 11/16/2021    Pneumonia due to COVID-19 virus 11/10/2021    Diabetic ulcer of left foot associated with diabetes mellitus due to underlying condition, with fat layer exposed ([address] 07/13/2021    WD-Osteomyelitis of great toe of left foot ([address] 05/04/2021    Sepsis ([address] 07/11/2016    Cholecystitis 03/29/2015    Pneumonia due to organism 02/18/2015    Leukocytosis 02/18/2015    Stroke, acute, within 8 weeks 03/29/2012    Hemiparesis, left ([address] 03/29/2012    Dysphagia 03/29/2012    Aphasia 03/29/2012       Electronically signed by Bre Dietrich PA-C on 11/21/2021 at 9:14 AM    Electronically signed by Karyn Estrada MD on 11/21/21 at 11:29 AM EST

## 2021-11-22 NOTE — PROGRESS NOTES
pulmonary      SUBJECTIVE:  No significant change     OBJECTIVE    VITALS:  BP (!) 116/50   Pulse 113   Temp 96.6 °F (35.9 °C) (Axillary)   Resp 28   Ht 5' 9.02\" (1.753 m)   Wt 202 lb 3.2 oz (91.7 kg)   SpO2 91%   BMI 29.85 kg/m²   HEAD AND FACE EXAM:  No throat injection, no active exudate,no thrush  NECK EXAM;No JVD, no masses, symmetrical  CHEST EXAM; Expansion equal and symmetrical, no masses  LUNG EXAM; Good breath sounds bilaterally. There are expiratory wheezes both lungs, there are crackles at both lung bases  CARDIOVASCULAR EXAM: Positive S1 and S2, no S3 or S4, no clicks ,no murmurs  RIGHT AND LEFT LOWER EXTRIMITY EXAM: No edema, no swelling, no inflamation            LABS   Lab Results   Component Value Date    WBC 14.0 (H) 11/22/2021    HGB 10.6 (L) 11/22/2021    HCT 30.8 (L) 11/22/2021    MCV 87.3 11/22/2021     11/22/2021     Lab Results   Component Value Date    CREATININE 0.4 (L) 11/22/2021    BUN 28 (H) 11/22/2021     11/22/2021    K 3.9 11/22/2021     11/22/2021    CO2 25 11/22/2021     Lab Results   Component Value Date    INR 0.92 11/11/2021    PROTIME 11.8 11/11/2021          Lab Results   Component Value Date    PHOS 2.2 11/22/2021    PHOS 2.7 11/21/2021    PHOS 1.8 11/20/2021      No results for input(s): PH, PO2ART, NZC9MRO, HCO3, BEART, O2SAT in the last 72 hours. Wt Readings from Last 3 Encounters:   07/09/21 205 lb (93 kg)   10/18/17 184 lb 14.4 oz (83.9 kg)   07/12/16 211 lb 12.8 oz (96.1 kg)               ASSESMENT  Ac resp failure  covid pneumonia  Ac bronchospasm        PLAN  1. cpm  2. Try to decrease fio2  3.  Bd rx as needed    11/22/2021  Tiesha Sneed MD, M.D.

## 2021-11-22 NOTE — PROGRESS NOTES
List:     Cerebral infarction ([facility]     HTN (hypertension)     DM (diabetes mellitus) with complications (HCC)     Hypercholesteremia     CAD (coronary artery disease)     Occlusion and stenosis of carotid artery     Stroke, acute, within 8 weeks     Hemiparesis, left (HCC)     Dysphagia     Aphasia     Pneumonia due to organism     Leukocytosis     Cholecystitis     Sepsis ([facility]     WD-Osteomyelitis of great toe of left foot (HCC)     Diabetic ulcer of left foot associated with diabetes mellitus due to underlying condition, with fat layer exposed ([facility]     Pneumonia due to COVID-19 virus     Acute respiratory failure with hypoxia ([facility]     Atrial fibrillation with RVR ([facility]     Inadequate nutrition     Septic shock ([facility]     Acute metabolic encephalopathy     Type 2 diabetes mellitus ([facility]     Hypokalemia     Cardiomyopathy ([facility]      Subjective:     Chief Complaint   Patient presents with    Positive For Covid-19     SOB, sxmfor 1 weeks, + home test yest, 77% on scene on NRB upon arrival. Home 02 at 3Lpm       F/U:  Interval History: Discussed with his nurse. Patient denies any complaint. No reported incident. Objective: Intake/Output Summary (Last 24 hours) at 11/22/2021 1005  Last data filed at 11/22/2021 4064  Gross per 24 hour   Intake 10 ml   Output 700 ml   Net -690 ml      Vitals:   Vitals:    11/22/21 0930   BP: (!) 113/46   Pulse: 109   Resp: (!) 32   Temp:    SpO2: (!) 85%     Physical Exam:  General: In mild respiratory distress-on high flow oxygen. He looks chronically wasted. Eyes: Not pale. Anicteric. Neck: No neck mass or thyromegaly  Lymph node: No cervical or supraclavicular lymphadenopathy  Neurology: Awake and alert. Severe hearing loss. He answers simple questions. Left-sided weakness. Cardiovascular: Regular. No murmur no S3  Abdomen: Soft. No tenderness. No palpable mass. : Ennis catheter noted  Extremities: Trace bilateral foot edema.   The feet are both warm.    Electronically signed by Asha Mcrae MD on 11/22/2021 at 10:05 AM  Rounding Hospitalist

## 2021-11-22 NOTE — PROGRESS NOTES
Daily Progress Note     Pt. Awake, not in any acute distress-on high flow O2  HR elevated this am, BP stable, in AF rate 120 range on exam    Doing ok-remains in afib rate in 100's will add digoxin low dose/amiodarone/cardizem --  Overall prognosis is guarded  Repeat xray     Covid 19 pneumonia     Treatment per primary care    Pulmonology following-appears stable overall- sating in the low 90's on high flow O2 this am- he does not want intubation or BiPAP per notes-weaning O2    Check CXR today     Afib with RVR    New onset    Amio, and eliquis started    rate still elevated on PO cardizem-120-140 again yesterday    On midodrine for HOTN    Add Dig today-check EKG for QT      Cardiomyopathy    EF 30%, last echo was 50% in 2015    BNP trending down    Will need ischemic w/u once recovered from infection    Will also optimize medications as able     Hx of CVA  Will follow      Echo 11/11/2021 Summary   Expedited imaging was used to obtain protocol images to reduce the   sonographer's exposure during COVID-19 pandemic.   Left ventricular systolic function is abnormal.   Ejection fraction is visually estimated at 30%.  Very frequent PVC's (up to 10 in a row noted).    Mild left ventricular hypertrophy.   Grade I diastolic dysfunction.   Sclerotic, but non-stenotic aortic valve.   Mild mitral and tricuspid regurgitation.   No evidence of any pericardial effusion        ACE/ ARB: no D/t HOTN- on pressor  Can this be changed to ARNI: No, d/t HOTN     B-blocker No d/t HOTN- will try cardizem initially for rate control and transition to BB once controlled     Persistently symptomatic AA with NYHA class III-IV  EF < 35 despite being on ACE/ ARB/ARNI: No  hydralazine + Nitrate No     Loop Diuretic No     NYHA class II-IV with eGFR >30/mil/min/173.m2 and K <5.0 mEq/l   mineralcorctcoid receptor antagonist (aldactone/ eplerenone) in addition to ACE or ARB and in conjunction with B blocker no d/t HOTN     HR greater than 70 with patient with LVEF  < 35 Yes  Able to use Ivabradine No, d/t AF     Will add farxiga once optimized on other meds     PAST MEDICAL HISTORY:  History of a stroke, left-sided.  History of  hypertension, hyperlipidemia, diabetes, coronary artery disease status  post 5-vessel bypass surgery done.  I do not know his graft.  Dysphagia,  pneumonia, UTI, kidney stones, and cholecystitis present.     PAST SURGICAL HISTORY:  History of a 5-vessel bypass surgery done. History of gallbladder removed, and also I think he has a partial  pancreatectomy done.     SOCIAL HISTORY:  He does not smoke and does not drink.     ALLERGIES:  His allergies are three medications, MENTHOL, ZINC OXIDE,  LYRICA, and PRADAXA.     MEDICATIONS AT HOME:  Prior to the hospitalization, he was on Bactrim,  Percocet, Protonix, Zoloft, Desyrel, and Pravachol.       Objective:   BP (!) 113/46   Pulse 109   Temp 98.4 °F (36.9 °C) (Axillary)   Resp (!) 32   Ht 5' 9.02\" (1.753 m)   Wt 202 lb 3.2 oz (91.7 kg)   SpO2 (!) 85%   BMI 29.85 kg/m²     Intake/Output Summary (Last 24 hours) at 11/22/2021 0938  Last data filed at 11/22/2021 0052  Gross per 24 hour   Intake 10 ml   Output 700 ml   Net -690 ml       Medications:   Scheduled Meds:   digoxin  125 mcg Oral Daily    midodrine  10 mg Oral TID WC    dilTIAZem  90 mg Oral TID    insulin lispro  0-18 Units SubCUTAneous TID WC    insulin lispro  0-18 Units SubCUTAneous Nightly    insulin NPH  15 Units SubCUTAneous QAM    cefTRIAXone (ROCEPHIN) IV  1,000 mg IntraVENous Q24H    azithromycin  500 mg IntraVENous Q24H    magnesium oxide  400 mg Oral Daily    potassium chloride  20 mEq Oral BID    insulin glargine  30 Units SubCUTAneous Nightly    lidocaine PF  5 mL IntraDERmal Once    sodium chloride flush  5-40 mL IntraVENous 2 times per day    baricitinib  4 mg Oral Daily    amiodarone  200 mg Oral Daily    apixaban  5 mg Oral BID    guaiFENesin  600 mg Oral BID    pantoprazole  40 mg Oral Daily    sertraline  100 mg Oral Daily    tamsulosin  0.4 mg Oral Daily    sodium chloride flush  5-40 mL IntraVENous 2 times per day      Infusions:   sodium chloride 25 mL (11/21/21 0927)    dextrose      sodium chloride        PRN Meds:  potassium chloride **OR** potassium alternative oral replacement **OR** potassium chloride, sodium chloride flush, sodium chloride, potassium chloride, glucose, dextrose, glucagon (rDNA), dextrose, sodium chloride flush, sodium chloride, ondansetron **OR** ondansetron, polyethylene glycol, acetaminophen **OR** acetaminophen, potassium chloride **OR** potassium alternative oral replacement **OR** potassium chloride       Physical Exam:  Vitals:    11/22/21 0930   BP: (!) 113/46   Pulse: 109   Resp: (!) 32   Temp:    SpO2: (!) 85%        General: AAO, NAD  Chest: Nontender  Cardiac: First and Second Heart Sounds are Normal, No Murmurs or Gallops noted  Lungs:Clear to auscultation and percussion. Abdomen: Soft, NT, ND, +BS  Extremities: No clubbing, no edema  Vascular:  Equal 2+ peripheral pulses. Lab Data:  CBC:   Recent Labs     11/20/21 0315 11/21/21 0600 11/22/21  0530   WBC 11.9* 10.6* 14.0*   HGB 10.4* 10.5* 10.6*   HCT 31.5* 31.6* 30.8*   MCV 90.8 89.5 87.3    242 260     BMP:   Recent Labs     11/20/21 0315 11/21/21 0600 11/22/21  0530   * 134* 139   K 3.8 3.9 3.9   CL 99 100 103   CO2 24 24 25   PHOS 1.8* 2.7 2.2*   BUN 22 25* 28*   CREATININE 0.4* 0.4* 0.4*     LIVER PROFILE:   Recent Labs     11/20/21 0315 11/21/21 0600 11/22/21  0530   AST 31 34 28   ALT 18 23 25   BILIDIR 0.3 0.2 0.2   BILITOT 0.7 0.5 0.6   ALKPHOS 98 124 130*     PT/INR: No results for input(s): PROTIME, INR in the last 72 hours. APTT: No results for input(s): APTT in the last 72 hours. BNP:  No results for input(s): BNP in the last 72 hours.       Assessment:  Patient Active Problem List    Diagnosis Date Noted    Cerebral infarction ([facility] 03/21/2012    Occlusion and stenosis of carotid artery 03/24/2012    HTN (hypertension) 03/21/2012    DM (diabetes mellitus) with complications ([address] 27/06/4296    CAD (coronary artery disease) 03/21/2012    Hypercholesteremia 03/21/2012    Acute respiratory failure with hypoxia ([address] 11/16/2021    Atrial fibrillation with RVR ([address] 11/16/2021    Inadequate nutrition 11/16/2021    Septic shock ([address] 11/16/2021    Acute metabolic encephalopathy 93/52/2819    Type 2 diabetes mellitus ([address] 11/16/2021    Hypokalemia 11/16/2021    Cardiomyopathy ([address] 11/16/2021    Pneumonia due to COVID-19 virus 11/10/2021    Diabetic ulcer of left foot associated with diabetes mellitus due to underlying condition, with fat layer exposed ([address] 07/13/2021    WD-Osteomyelitis of great toe of left foot ([address] 05/04/2021    Sepsis ([address] 07/11/2016    Cholecystitis 03/29/2015    Pneumonia due to organism 02/18/2015    Leukocytosis 02/18/2015    Stroke, acute, within 8 weeks 03/29/2012    Hemiparesis, left ([address] 03/29/2012    Dysphagia 03/29/2012    Aphasia 03/29/2012       Electronically signed by Fredy Hankins PA-C on 11/22/2021 at 9:38 AM     Electronically signed by Bernadine Bernabe MD on 11/22/21 at 10:01 AM EST

## 2021-11-22 NOTE — CARE COORDINATION
CM reviewed notes. Pt is on O2 at 45 lmin vapotherm. O2 trending down. Per notes pt is from home with wife. Pt has home O2 at 3 lmin. Palliative care consult completed and goals of care were discussed. Pt does not want intubation per notes. CM team is following for discharge planning.  [address]

## 2021-11-23 NOTE — DISCHARGE SUMMARY
Kaylee Ny 1945 [id number]  PCP:  Aliyah Rosales MD    Admit date: 11/10/2021  Admitting Physician: Inez Valencia MD    Discharge date: 11/23/2021 Discharge Physician: Michoacano Gonzales MD      Reason for admission:   Chief Complaint   Patient presents with    Positive For Covid-19     SOB, sxmfor 1 weeks, + home test yest, 77% on scene on NRB upon arrival. Home 02 at 3Lpm     Present on Admission:   Pneumonia due to COVID-19 virus   HTN (hypertension)   Acute respiratory failure with hypoxia ([address]   Atrial fibrillation with RVR ([address]   Inadequate nutrition   Septic shock ([address]   Acute metabolic encephalopathy   Type 2 diabetes mellitus ([address]   Hypokalemia   Cardiomyopathy [facility]       Discharge Diagnoses Include:  1. COVID-19 pneumonia with hypoxia  2. Acute metabolic encephalopathy  3. New onset of atrial fibrillation with rapid ventricular rate  4. Cardiomyopathy  5. Diabetes type 2 with hyperglycemia/hypoglycemia  6. Hypokalemia, hypomagnesemia and hypophosphatemia  7. Septic shock    Hospital Course:  Kaylee Ny is a 66 y.o. male admitted for COVID-19 pneumonia, +2 days ago, at worsening shortness of breath, now found to be in A. fib RVR, family at bedside to provide history, however history obtained from report. Patient has left-sided paralysis, is proving confused, however per family thinks that he is at baseline at this time. Patient is nonverbal    Admitted as:  1. COVID-19 pneumonia with hypoxia: Completed IV dexamethasone. Currently on baricitinib (end date 11/26/2021). Patient is requiring oxygen via high flow device. His CODE STATUS is limited (no CPR, BiPAP or intubation). I doubt bacterial pneumonia- on IV Rocephin and Zithromax. Procalcitonin is normal.     2. Acute metabolic encephalopathy: CT head-no acute findings. Patient probably has baseline cognitive impairment, severe hearing loss and left eye visual disturbance.   He is practically bedbound after last stroke (2015) and is wheelchair dependent. Continue neurochecks.     3. New onset of atrial fibrillation with rapid ventricular rate: Cardiology consult appreciated. On oral Cardizem, digoxin and amiodarone for ventricular rate control. On Eliquis for stroke prophylaxis.      4.  Cardiomyopathy: EF 30%. Compensated. Cardiology note acknowledged-ischemic work-up at a later date when COVID-19 pneumonia has resolved. Keep magnesium above 2.     5.  Diabetes type 2/hypoglycemia: On Lantus, mealtime insulin and sliding scale at home. On Lantus, NPH and insulin sliding scale because of hyperglycemia while on steroids. He had hypoglycemia and NPH was discontinued. Endocrinology input acknowledged.     6. Hypokalemia/hypomagnesemia/hypophosphatemia: Replaced.       7. Septic shock: Resolved     Chronic problems include hypertension, hyperlipidemia, CAD (CABG), history of CVA x2 (3/12 and  with residual left-sided weakness and severe hearing loss) and gastroesophageal reflux.     DVT prophylaxis: On Eliquis  Disposition: He lives with his wife. He is wheelchair dependent. CODE STATUS: Limited (no CPR, BiPAP or intubation)    However, patient condition deteriorated and he finally  at 5:32 PM.      Pt was personally examined by me on the day of discharge with the following findings: Please see my progress note on the day he .     Procedures: None    Significant Diagnostic Studies at discharge:   CBC:   Lab Results   Component Value Date    WBC 20.3 2021    RBC 3.41 2021    HGB 10.2 2021    HCT 30.5 2021    MCV 89.4 2021    MCH 29.9 2021    MCHC 33.4 2021    RDW 14.0 2021     2021    MPV 12.2 2021     BMP:    Lab Results   Component Value Date     2021    K 3.5 2021     2021    CO2 25 2021    BUN 30 2021    LABALBU 2.2 2021    LABALBU 2.2 2021    CREATININE 0.5 2021 CALCIUM 7.5 11/23/2021    GFRAA >60 11/23/2021    LABGLOM >60 11/23/2021    GLUCOSE 70 11/23/2021       Patient Instructions:     Medication List      ASK your doctor about these medications    docusate 100 MG Caps  Commonly known as: COLACE, DULCOLAX  Take 100 mg by mouth 2 times daily as needed for Constipation. guaiFENesin 600 MG extended release tablet  Commonly known as: MUCINEX  Take 1 tablet by mouth 2 times daily     insulin glargine 100 UNIT/ML injection vial  Commonly known as: LANTUS     MiraLax 17 GM/SCOOP powder  Generic drug: polyethylene glycol     NovoLOG 100 UNIT/ML injection vial  Generic drug: insulin aspart     nystatin 424302 UNIT/GM powder  Commonly known as: MYCOSTATIN  Apply topically 4 times daily. * oxyCODONE-acetaminophen  MG per tablet  Commonly known as: PERCOCET     * oxyCODONE-acetaminophen 5-325 MG per tablet  Commonly known as: PERCOCET     pantoprazole 40 MG tablet  Commonly known as: PROTONIX     pravastatin 40 MG tablet  Commonly known as: PRAVACHOL     sertraline 25 MG tablet  Commonly known as: ZOLOFT  Take 2 tablets by mouth daily. sulfamethoxazole-trimethoprim 200-40 MG/5ML suspension  Commonly known as: BACTRIM;SEPTRA     tamsulosin 0.4 MG capsule  Commonly known as: FLOMAX     traZODone 50 MG tablet  Commonly known as: DESYREL  Take 1 tablet by mouth nightly as needed for Sleep.     vitamin B-12 1000 MCG tablet  Commonly known as: CYANOCOBALAMIN     vitamin B-6 100 MG tablet  Commonly known as: PYRIDOXINE         * This list has 2 medication(s) that are the same as other medications prescribed for you. Read the directions carefully, and ask your doctor or other care provider to review them with you.                  Code Status: Limited     Consults:   IP CONSULT TO CARDIOLOGY  IP CONSULT TO HOSPITALIST  IP CONSULT TO PULMONOLOGY  IP CONSULT TO PHARMACY  IP CONSULT TO ENDOCRINOLOGY  IP CONSULT TO PULMONOLOGY  IP CONSULT TO PHARMACY  IP CONSULT TO DIETITIAN  IP CONSULT TO CASE MANAGEMENT  IP CONSULT TO PALLIATIVE CARE    Diet: Not applicable    Activity: Not applicable. Work:    Discharged Condition: He         Prognosis: Not applicable    Disposition: He . Follow-up with: Not applicable    Follow up labs: Not applicable       Discharge Physician Signed: Electronically signed by Kailash Haro MD on 2021 at 5:44 PM    The patient was seen and examined on day of discharge and this discharge summary is in conjunction with any daily progress note from day of discharge.   Time spent on discharge in the examination, evaluation, counseling and review of medications and discharge plan: >30 minutes

## 2021-11-23 NOTE — PROGRESS NOTES
Speech Language Pathology  [facility]  DEPARTMENT OF SPEECH/LANGUAGE PATHOLOGY  DAILY PROGRESS NOTE  Mckenna Wallace  11/23/2021  [id number]  Atrial fibrillation with RVR ([address] [I48.91]  Acute respiratory failure with hypoxia (HCC) [J96.01]  COVID-19 [U07.1]  Allergies   Allergen Reactions    Calmoseptine [Menthol-Zinc Oxide]     Lyrica [Pregabalin] Other (See Comments)     Eyes cross    Pradaxa [Dabigatran Etexilate Mesylate] Other (See Comments)     Headache           Pt was seen this date for dysphagia treatment. IMPRESSION AND RECOMMENDATIONS:   Pt was seen this date for dysphagia follow-up. He was seated upright in bed, alert, cooperative. Benefits from increased time throughout. Noted he remains on Airvo, 60L at 93% FiO2. He produced more spontaneous verbalizations today with inconsistent intelligibility. He requested a drink. He was presented with PO trials of honey thick liquids via tsp/straw, nectar thick liquids via tsp/straw, and pureed consistency to assess diet tolerance and readiness for possible advancement. Oropharyngeal swallow appears grossly unchanged with weak/adequate labial seal, slow oral manipulation/AP transit, and likely premature pharyngeal entry. Suspect continued pharyngeal dysphagia with delayed swallow initiation, reduced laryngeal elevation, and immediate wet coughing following trials of nectar thick liquids only. Pt unable to follow directions to participate in attempted volitional/effortful swallow exercises. Recommend continued pureed diet/honey thick liquids, total feed, strict aspiration precautions. SLP will monitor chart for further needs.     GOALS (current status in bold):  Short-term Goals  Timeframe for Short-term Goals: LOS or until goals are met  Goal 1: Pt will tolerate puree diet/honey thick liquids by spoon or cup without clinical evidence of aspiration 100% Meeting, continue  Goal 2: Pt will participate in advanced trials for possible safe diet level advancement 10/10 trials Targeted, overt s/s aspiration with nectar thick liquids  Goal 3: Pt/caregivers will demonstrate comprehension of recommendations/POC Meeting, Continue      EDUCATION: discussed recommendations with RN    PAIN RATING (0-10 Scale): appears in no distress  Time in/Time out: SLP Individual Minutes  Time In: 6947  Time Out: 6580  Minutes: 30    Visit number: [address] MA [facility]  11/23/2021  2:25 PM

## 2021-11-23 NOTE — PROGRESS NOTES
Rapid response note:    I was called to see patient who became severely hypoxic, bradycardic and poorly responsive. Patient CODE STATUS is limited-no CPR, no BiPAP and no intubation. I called his wife Sharon Almodovar) and daughter at home to update them of current situation. Patient finally  at 5:32 PM.  Family informed.

## 2021-11-23 NOTE — PROGRESS NOTES
Daily Progress Note     Pt. Awake, not in any acute distress-on high flow O2  HR  stable, BP stable, in AF rate 90 range on exam  Still very weak-remain in afib rate In 100's  covid making a slow recovery  Cardizem/dig/amiodarone for now and AC  Overall prognosis is guarded     Covid 19 pneumonia     Treatment per primary care    Pulmonology following-appears stable overall- sating in the low 90's on high flow O2 this am- he does not want intubation or BiPAP per notes-weaning O2    CXR stable     Afib with RVR    New onset    Amio, and eliquis started    On PO cardizem and Dig now as well    On midodrine for HOTN    Check EKG for QT    Rate improved from yesterday      Cardiomyopathy    EF 30%, last echo was 50% in 2015    BNP trending down    Will need ischemic w/u once recovered from infection    Will also optimize medications as able     Hx of CVA  Will follow      Echo 11/11/2021 Summary   Expedited imaging was used to obtain protocol images to reduce the   sonographer's exposure during COVID-19 pandemic.   Left ventricular systolic function is abnormal.   Ejection fraction is visually estimated at 30%.  Very frequent PVC's (up to 10 in a row noted).    Mild left ventricular hypertrophy.   Grade I diastolic dysfunction.   Sclerotic, but non-stenotic aortic valve.   Mild mitral and tricuspid regurgitation.   No evidence of any pericardial effusion        ACE/ ARB: no D/t HOTN- on pressor  Can this be changed to ARNI: No, d/t HOTN     B-blocker No d/t HOTN- will try cardizem initially for rate control and transition to BB once controlled     Persistently symptomatic AA with NYHA class III-IV  EF < 35 despite being on ACE/ ARB/ARNI: No  hydralazine + Nitrate No     Loop Diuretic No     NYHA class II-IV with eGFR >30/mil/min/173.m2 and K <5.0 mEq/l   mineralcorctcoid receptor antagonist (aldactone/ eplerenone) in addition to ACE or ARB and in conjunction with B blocker no d/t HOTN     HR greater than 70 with patient with LVEF  < 35 Yes  Able to use Ivabradine No, d/t AF     Will add farxiga once optimized on other meds     PAST MEDICAL HISTORY:  History of a stroke, left-sided.  History of  hypertension, hyperlipidemia, diabetes, coronary artery disease status  post 5-vessel bypass surgery done.  I do not know his graft.  Dysphagia,  pneumonia, UTI, kidney stones, and cholecystitis present.     PAST SURGICAL HISTORY:  History of a 5-vessel bypass surgery done. History of gallbladder removed, and also I think he has a partial  pancreatectomy done.     SOCIAL HISTORY:  He does not smoke and does not drink.     ALLERGIES:  His allergies are three medications, MENTHOL, ZINC OXIDE,  LYRICA, and PRADAXA.     MEDICATIONS AT HOME:  Prior to the hospitalization, he was on Bactrim,  Percocet, Protonix, Zoloft, Desyrel, and Pravachol.       Objective:   BP (!) 120/46   Pulse 92   Temp 97.6 °F (36.4 °C) (Axillary)   Resp 22   Ht 5' 9.02\" (1.753 m)   Wt 202 lb 3.2 oz (91.7 kg)   SpO2 93%   BMI 29.85 kg/m²     Intake/Output Summary (Last 24 hours) at 11/23/2021 1151  Last data filed at 11/23/2021 0532  Gross per 24 hour   Intake 20 ml   Output 1150 ml   Net -1130 ml       Medications:   Scheduled Meds:   insulin glargine  15 Units SubCUTAneous Nightly    digoxin  125 mcg Oral Daily    midodrine  10 mg Oral TID WC    dilTIAZem  90 mg Oral TID    insulin lispro  0-18 Units SubCUTAneous TID WC    insulin lispro  0-18 Units SubCUTAneous Nightly    cefTRIAXone (ROCEPHIN) IV  1,000 mg IntraVENous Q24H    azithromycin  500 mg IntraVENous Q24H    magnesium oxide  400 mg Oral Daily    potassium chloride  20 mEq Oral BID    lidocaine PF  5 mL IntraDERmal Once    sodium chloride flush  5-40 mL IntraVENous 2 times per day    baricitinib  4 mg Oral Daily    amiodarone  200 mg Oral Daily    apixaban  5 mg Oral BID    guaiFENesin  600 mg Oral BID    pantoprazole  40 mg Oral Daily    sertraline  100 mg Oral Daily    tamsulosin  0.4 mg Oral Daily    sodium chloride flush  5-40 mL IntraVENous 2 times per day      Infusions:   sodium chloride 25 mL (11/21/21 0927)    dextrose 100 mL/hr (11/23/21 0247)    sodium chloride        PRN Meds:  potassium chloride **OR** potassium alternative oral replacement **OR** potassium chloride, sodium chloride flush, sodium chloride, potassium chloride, glucose, dextrose, glucagon (rDNA), dextrose, sodium chloride flush, sodium chloride, ondansetron **OR** ondansetron, polyethylene glycol, acetaminophen **OR** acetaminophen, potassium chloride **OR** potassium alternative oral replacement **OR** potassium chloride       Physical Exam:  Vitals:    11/23/21 1038   BP:    Pulse:    Resp:    Temp: 97.6 °F (36.4 °C)   SpO2:         General: AAO, NAD  Chest: Nontender  Cardiac: First and Second Heart Sounds are Normal, No Murmurs or Gallops noted  Lungs:Clear to auscultation and percussion. Abdomen: Soft, NT, ND, +BS  Extremities: No clubbing, no edema  Vascular:  Equal 2+ peripheral pulses. Lab Data:  CBC:   Recent Labs     11/21/21  0600 11/22/21 0530 11/23/21 0445   WBC 10.6* 14.0* 20.3*   HGB 10.5* 10.6* 10.2*   HCT 31.6* 30.8* 30.5*   MCV 89.5 87.3 89.4    260 206     BMP:   Recent Labs     11/21/21  0600 11/22/21 0530 11/23/21  0445   * 139 139   K 3.9 3.9 3.5    103 103   CO2 24 25 25   PHOS 2.7 2.2* 3.3   BUN 25* 28* 30*   CREATININE 0.4* 0.4* 0.5*     LIVER PROFILE:   Recent Labs     11/21/21  0600 11/22/21 0530 11/23/21  0445   AST 34 28 23   ALT 23 25 21   BILIDIR 0.2 0.2 0.2   BILITOT 0.5 0.6 0.4   ALKPHOS 124 130* 148*     PT/INR: No results for input(s): PROTIME, INR in the last 72 hours. APTT: No results for input(s): APTT in the last 72 hours. BNP:  No results for input(s): BNP in the last 72 hours.       Assessment:  Patient Active Problem List    Diagnosis Date Noted    Cerebral infarction ([facility] 03/21/2012    Occlusion and stenosis of carotid artery 03/24/2012    HTN (hypertension) 03/21/2012    DM (diabetes mellitus) with complications ([address] 08/28/3159    CAD (coronary artery disease) 03/21/2012    Hypercholesteremia 03/21/2012    Acute respiratory failure with hypoxia ([address] 11/16/2021    Atrial fibrillation with RVR ([address] 11/16/2021    Inadequate nutrition 11/16/2021    Septic shock ([address] 11/16/2021    Acute metabolic encephalopathy 97/06/1532    Type 2 diabetes mellitus ([address] 11/16/2021    Hypokalemia 11/16/2021    Cardiomyopathy ([address] 11/16/2021    Pneumonia due to COVID-19 virus 11/10/2021    Diabetic ulcer of left foot associated with diabetes mellitus due to underlying condition, with fat layer exposed ([address] 07/13/2021    WD-Osteomyelitis of great toe of left foot ([address] 05/04/2021    Sepsis ([address] 07/11/2016    Cholecystitis 03/29/2015    Pneumonia due to organism 02/18/2015    Leukocytosis 02/18/2015    Stroke, acute, within 8 weeks 03/29/2012    Hemiparesis, left ([address] 03/29/2012    Dysphagia 03/29/2012    Aphasia 03/29/2012       Electronically signed by Fredy Hankins PA-C on 11/23/2021 at 11:51 AM    Electronically signed by Bernadine Bernabe MD on 11/23/21 at 12:44 PM EST

## 2021-11-23 NOTE — PROGRESS NOTES
COVID pneumonia predominantly at the lung bases         XR CHEST PORTABLE   Final Result   1. Findings of COVID pneumonia at the lung bases, left worse than right. No   significant improvement compared to recent exams of 11/16/2021 and   11/17/2021. Partial improvement compared to 11/10/2021. XR CHEST PORTABLE   Final Result   1. Right IJ CVC terminating in the right atrium. No measurable pneumothorax. 2. Mildly improved right basilar opacities. Grossly stable left basilar   opacities. IR NONTUNNELED VASCULAR CATHETER > 5 YEARS   Final Result   Successful ultrasound guided non-tunneled right internal jugular vein   triple-lumen central venous catheter placement at the unit bedside. XR CHEST PORTABLE   Final Result   Slightly increased severity of left greater than right partially   consolidative bibasilar pulmonary disease consistent with pneumonia         XR CHEST PORTABLE   Final Result   Slightly decreased but persistent pulmonary opacities could represent edema   or multifocal pneumonia         CT HEAD WO CONTRAST   Final Result   No acute intracranial abnormality. Large old right MCA infarct severe chronic microvascular disease within the   periventricular         XR CHEST PORTABLE   Final Result   1. Multifocal bilateral pneumonia in this patient with given history of   COVID-19.               Scheduled Medicines   Medications:    digoxin  125 mcg Oral Daily    midodrine  10 mg Oral TID WC    dilTIAZem  90 mg Oral TID    insulin lispro  0-18 Units SubCUTAneous TID WC    insulin lispro  0-18 Units SubCUTAneous Nightly    insulin NPH  15 Units SubCUTAneous QAM    cefTRIAXone (ROCEPHIN) IV  1,000 mg IntraVENous Q24H    azithromycin  500 mg IntraVENous Q24H    magnesium oxide  400 mg Oral Daily    potassium chloride  20 mEq Oral BID    insulin glargine  30 Units SubCUTAneous Nightly    lidocaine PF  5 mL IntraDERmal Once    sodium chloride flush  5-40 mL IntraVENous 2 Blood glucose frequently   5. Modified  the dose of Insulin/ other medicines as needed   6. Patient refused for to be intubated and go on ventilator  7. Will follow     .      Yash Andrea MD, MD

## 2021-11-23 NOTE — PROGRESS NOTES
PT BS 41 @ 8570. PT alert but clammy Low Glucose protocol followed. Accu checks [id number] until stabilized.

## 2021-11-23 NOTE — PROGRESS NOTES
Hospitalist Progress Note       11/23/2021 9:05 AM  Admit Date: 11/10/2021    PCP: Saul Holloway MD     Assessment and Plan:   1. COVID-19 pneumonia with hypoxia: Completed IV dexamethasone. Currently on baricitinib (end date 11/26/2021). Patient is requiring oxygen via high flow device. His CODE STATUS is limited (no CPR, BiPAP or intubation). I doubt bacterial pneumonia-currently on IV Rocephin and Zithromax. Procalcitonin is normal.    2.  Acute metabolic encephalopathy: CT head-no acute findings. Patient probably has baseline cognitive impairment, severe hearing loss and left eye visual disturbance. He is practically bedbound after last stroke (2015) and is wheelchair dependent. Continue neurochecks. 3.  New onset of atrial fibrillation with rapid ventricular rate: Cardiology consult appreciated. Now on oral Cardizem, digoxin and amiodarone for ventricular rate control. On Eliquis for stroke prophylaxis. 4.  Cardiomyopathy: EF 30%. Compensated. Cardiology note acknowledged-ischemic work-up at a later date when COVID-19 pneumonia has resolved. Keep magnesium above 2.    5.  Diabetes type 2/hypoglycemia: On Lantus, mealtime insulin and sliding scale at home. Currently on Lantus, NPH and insulin sliding scale. He had hypoglycemia this morning-we will discontinue her NPH. Endocrinology follows. 6.  Hypokalemia/hypomagnesemia/hypophosphatemia: Replaced. 7.  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 DVT prophylaxis: On Eliquis  Disposition: He lives with his wife. He is wheelchair dependent. CODE STATUS: Limited (no CPR, BiPAP or intubation)    I called wife at home today Fozia Buys ) to update her.     Patient Active Problem List:     Cerebral infarction ([facility]     HTN (hypertension)     DM (diabetes mellitus) with complications (HCC)     Hypercholesteremia     CAD (coronary artery disease)     Occlusion and stenosis of carotid artery     Stroke, acute, within 8 weeks     Hemiparesis, left (HCC)     Dysphagia     Aphasia     Pneumonia due to organism     Leukocytosis     Cholecystitis     Sepsis ([address]     WD-Osteomyelitis of great toe of left foot ([address]     Diabetic ulcer of left foot associated with diabetes mellitus due to underlying condition, with fat layer exposed ([address]     Pneumonia due to COVID-19 virus     Acute respiratory failure with hypoxia (HCC)     Atrial fibrillation with RVR ([address]     Inadequate nutrition     Septic shock ([address]     Acute metabolic encephalopathy     Type 2 diabetes mellitus ([facility]     Hypokalemia     Cardiomyopathy ([facility]      Subjective:     Chief Complaint   Patient presents with    Positive For Covid-19     SOB, sxmfor 1 weeks, + home test yest, 77% on scene on NRB upon arrival. Home 02 at 3Lpm       F/U:  Interval History: Discussed with his nurse. No reported incident except for hypoglycemia of 63 at 5 AM this morning. Objective: Intake/Output Summary (Last 24 hours) at 11/23/2021 0905  Last data filed at 11/23/2021 0532  Gross per 24 hour   Intake 20 ml   Output 1150 ml   Net -1130 ml      Vitals:   Vitals:    11/23/21 0714   BP:    Pulse:    Resp: 28   Temp:    SpO2: (!) 85%     Physical Exam:  General: In mild respiratory distress-on high flow oxygen. He looks chronically wasted. Eyes: Not pale. Anicteric. Neck: No neck mass or thyromegaly  Lymph node: No cervical or supraclavicular lymphadenopathy  Neurology: Awake and alert. Severe hearing loss. Left-sided weakness. Cardiovascular: Regular. No murmur no S3  Abdomen: Soft. No tenderness. No palpable mass. : Ennis catheter noted  Extremities: Trace bilateral foot edema. The feet are both warm.     Electronically signed by Montez Marie MD on 11/23/2021 at [address] [facility]

## 2021-11-23 NOTE — PROGRESS NOTES
pulmonary      SUBJECTIVE:  He remains on airvo 90%     OBJECTIVE    VITALS:  BP (!) 112/50   Pulse 86   Temp 97 °F (36.1 °C) (Temporal)   Resp 22   Ht 5' 9.02\" (1.753 m)   Wt 202 lb 3.2 oz (91.7 kg)   SpO2 94%   BMI 29.85 kg/m²   HEAD AND FACE EXAM:  No throat injection, no active exudate,no thrush  NECK EXAM;No JVD, no masses, symmetrical  CHEST EXAM; Expansion equal and symmetrical, no masses  LUNG EXAM; Good breath sounds bilaterally. There are expiratory wheezes both lungs, there are crackles at both lung bases  CARDIOVASCULAR EXAM: Positive S1 and S2, no S3 or S4, no clicks ,no murmurs  RIGHT AND LEFT LOWER EXTRIMITY EXAM: No edema, no swelling, no inflamation            LABS   Lab Results   Component Value Date    WBC 20.3 (H) 11/23/2021    HGB 10.2 (L) 11/23/2021    HCT 30.5 (L) 11/23/2021    MCV 89.4 11/23/2021     11/23/2021     Lab Results   Component Value Date    CREATININE 0.4 (L) 11/22/2021    BUN 28 (H) 11/22/2021     11/22/2021    K 3.9 11/22/2021     11/22/2021    CO2 25 11/22/2021     Lab Results   Component Value Date    INR 0.92 11/11/2021    PROTIME 11.8 11/11/2021          Lab Results   Component Value Date    PHOS 2.2 11/22/2021    PHOS 2.7 11/21/2021    PHOS 1.8 11/20/2021      No results for input(s): PH, PO2ART, UKZ8EFN, HCO3, BEART, O2SAT in the last 72 hours. Wt Readings from Last 3 Encounters:   07/09/21 205 lb (93 kg)   10/18/17 184 lb 14.4 oz (83.9 kg)   07/12/16 211 lb 12.8 oz (96.1 kg)               ASSESMENT  Ac resp failure  covid pneumonia  Ac bronchospasm        PLAN  1. Bd rx  2. Cont decadron  3. On baricitinab  4.  Cont airvo  5. bipap if needed    11/23/2021  Nain Longoria MD, M.D.

## 2021-11-23 NOTE — PROGRESS NOTES
N.O. received to decrease Dextrose 5% from 100 ml/hr to 50 ml/hr and check BS q 1 hours x 3 hours to determine when to stop Dextrose.

## 2021-11-24 ENCOUNTER — TELEPHONE (OUTPATIENT)
Dept: WOUND CARE | Age: 76
End: 2021-11-24

## 2021-11-24 DIAGNOSIS — M86.9 OSTEOMYELITIS OF GREAT TOE OF LEFT FOOT (HCC): ICD-10-CM

## 2021-11-24 NOTE — PROGRESS NOTES
Attempted to feed pt dinner,pt unresponsive to tactile stemuli,o2 sat 67,Dr and charge nurse aware, family notified and chose not to come in to see pt, home on record,